# Patient Record
Sex: FEMALE | Race: WHITE | HISPANIC OR LATINO | Employment: FULL TIME | URBAN - METROPOLITAN AREA
[De-identification: names, ages, dates, MRNs, and addresses within clinical notes are randomized per-mention and may not be internally consistent; named-entity substitution may affect disease eponyms.]

---

## 2014-08-07 LAB
PAP SMEAR - HIM PATIENT REPORTED: NEGATIVE
PAP: NORMAL

## 2016-01-21 LAB
CHOLEST SERPL-MCNC: 163 MG/DL
CHOLEST/HDLC SERPL: 3.4 {RATIO}
HDLC SERPL-MCNC: 48 MG/DL
LDLC SERPL CALC-MCNC: 92 MG/DL
NONHDLC SERPL-MCNC: 115 MG/DL
TRIGL SERPL-MCNC: 115 MG/DL
TSH SERPL-ACNC: 2.8 MCU/ML

## 2017-04-05 ENCOUNTER — OFFICE VISIT (OUTPATIENT)
Dept: FAMILY MEDICINE | Facility: CLINIC | Age: 47
End: 2017-04-05
Payer: COMMERCIAL

## 2017-04-05 VITALS
HEIGHT: 63 IN | BODY MASS INDEX: 38.25 KG/M2 | WEIGHT: 215.9 LBS | HEART RATE: 87 BPM | SYSTOLIC BLOOD PRESSURE: 121 MMHG | DIASTOLIC BLOOD PRESSURE: 89 MMHG | OXYGEN SATURATION: 98 % | TEMPERATURE: 98.4 F

## 2017-04-05 DIAGNOSIS — Z13.6 CARDIOVASCULAR SCREENING; LDL GOAL LESS THAN 160: ICD-10-CM

## 2017-04-05 DIAGNOSIS — J31.0 CHRONIC RHINITIS: ICD-10-CM

## 2017-04-05 DIAGNOSIS — Z00.00 ROUTINE GENERAL MEDICAL EXAMINATION AT A HEALTH CARE FACILITY: Primary | ICD-10-CM

## 2017-04-05 DIAGNOSIS — Z12.4 SCREENING FOR CERVICAL CANCER: ICD-10-CM

## 2017-04-05 PROCEDURE — 99386 PREV VISIT NEW AGE 40-64: CPT | Performed by: FAMILY MEDICINE

## 2017-04-05 PROCEDURE — 87624 HPV HI-RISK TYP POOLED RSLT: CPT | Performed by: FAMILY MEDICINE

## 2017-04-05 PROCEDURE — G0145 SCR C/V CYTO,THINLAYER,RESCR: HCPCS | Performed by: FAMILY MEDICINE

## 2017-04-05 RX ORDER — FLUTICASONE PROPIONATE 50 MCG
1-2 SPRAY, SUSPENSION (ML) NASAL DAILY
Qty: 3 BOTTLE | Refills: 1 | Status: SHIPPED | OUTPATIENT
Start: 2017-04-05 | End: 2021-03-29

## 2017-04-05 RX ORDER — ALBUTEROL SULFATE 90 UG/1
AEROSOL, METERED RESPIRATORY (INHALATION)
COMMUNITY
Start: 2016-01-21 | End: 2019-12-05

## 2017-04-05 RX ORDER — PSEUDOEPHEDRINE HCL 120 MG/1
120 TABLET, FILM COATED, EXTENDED RELEASE ORAL EVERY 12 HOURS
Qty: 60 TABLET | Refills: 11 | Status: SHIPPED | OUTPATIENT
Start: 2017-04-05 | End: 2019-12-05

## 2017-04-05 RX ORDER — EPINEPHRINE 0.3 MG/.3ML
0.3 INJECTION SUBCUTANEOUS
COMMUNITY
Start: 2016-01-21 | End: 2019-12-05

## 2017-04-05 NOTE — MR AVS SNAPSHOT
After Visit Summary   4/5/2017    Justyna Zhang    MRN: [id number]           Patient Information     Date Of Birth          1970        Visit Information        Provider Department      4/5/2017 1:30 PM Cora Biggs MD [facility]        Today's Diagnoses     Routine general medical examination at a health care facility    -  1    Chronic rhinitis        Screening for cervical cancer        CARDIOVASCULAR SCREENING; LDL GOAL LESS THAN 160          Care Instructions      Preventive Health Recommendations  Female Ages 40 to 49    Yearly exam:     See your health care provider every year in order to  1. Review health changes.   2. Discuss preventive care.    3. Review your medicines if your doctor prescribed any.      Get a Pap test every three years (unless you have an abnormal result and your provider advises testing more often).      If you get Pap tests with HPV test, you only need to test every 5 years, unless you have an abnormal result. You do not need a Pap test if your uterus was removed (hysterectomy) and you have not had cancer.      You should be tested each year for STDs (sexually transmitted diseases), if you're at risk.       Ask your doctor if you should have a mammogram.      Have a colonoscopy (test for colon cancer) if someone in your family has had colon cancer or polyps before age 50.       Have a cholesterol test every 5 years.       Have a diabetes test (fasting glucose) after age 45. If you are at risk for diabetes, you should have this test every 3 years.    Shots: Get a flu shot each year. Get a tetanus shot every 10 years.     Nutrition:     Eat at least 5 servings of fruits and vegetables each day.    Eat whole-grain bread, whole-wheat pasta and brown rice instead of white grains and rice.    Talk to your provider about Calcium and Vitamin D.     Lifestyle    Exercise at least 150 minutes a week (an average of 30 minutes a day, 5 days a week). This will  "help you control your weight and prevent disease.    Limit alcohol to one drink per day.    No smoking.     Wear sunscreen to prevent skin cancer.    See your dentist every six months for an exam and cleaning.          Try the flonase and take the loratadine daily  Use the sudafed only when you need it   Watch the salt intake and see how the blood pressure is         Follow-ups after your visit        Who to contact     Normal or non-critical lab and imaging results will be communicated to you by [employer], letter or phone within 4 business days after the clinic has received the results. If you do not hear from us within 7 days, please contact the clinic through [employer] or phone. If you have a critical or abnormal lab result, we will notify you by phone as soon as possible.  Submit refill requests through [email or web address] or call your pharmacy and they will forward the refill request to us. Please allow 3 business days for your refill to be completed.          If you need to speak with a  for additional information , please call: [phone]             Additional Information About Your Visit        [email or web address] Information     [email or web address] lets you send messages to your doctor, view your test results, renew your prescriptions, schedule appointments and more. To sign up, go to [email or web address] . Click on \"Log in\" on the left side of the screen, which will take you to the Welcome page. Then click on \"Sign up Now\" on the right side of the page.     You will be asked to enter the access code listed below, as well as some personal information. Please follow the directions to create your username and password.     Your access code is: [email or web address]  Expires: 2017  2:26 PM     Your access code will  in 90 days. If you need help or a new code, please call your [place] clinic or [phone].        Care EveryWhere ID     This is your Care EveryWhere ID. This could be used by other organizations to access your " "[facility] medical records  [id number]        Your Vitals Were     Pulse Temperature Height Last Period Pulse Oximetry BMI (Body Mass Index)    87 98.4  F (36.9  C) (Tympanic) 5' 3\" (1.6 m) 04/02/2017 (Exact Date) 98% 38.24 kg/m2       Blood Pressure from Last 3 Encounters:   04/05/17 121/89   11/11/10 135/82   10/26/10 115/86    Weight from Last 3 Encounters:   04/05/17 215 lb 14.4 oz (97.9 kg)   10/26/10 210 lb (95.3 kg)              We Performed the Following     HPV High Risk Types DNA Cervical     Pap imaged thin layer screen with HPV - recommended age 30 - 65          Today's Medication Changes          These changes are accurate as of: 4/5/17  2:26 PM.  If you have any questions, ask your nurse or doctor.               Start taking these medicines.        Dose/Directions    fluticasone 50 MCG/ACT spray   Commonly known as:  FLONASE   Used for:  Chronic rhinitis   Started by:  Cora Biggs MD        Dose:  1-2 spray   Spray 1-2 sprays into both nostrils daily   Quantity:  3 Bottle   Refills:  1       pseudoePHEDrine 120 MG 12 hr tablet   Commonly known as:  SUDAFED   Used for:  Chronic rhinitis   Started by:  Cora Biggs MD        Dose:  120 mg   Take 1 tablet (120 mg) by mouth every 12 hours   Quantity:  60 tablet   Refills:  11            Where to get your medicines      These medications were sent to [facility] [place], [place] - [address] [facility]  [address] [facility] [address]     Phone:  [phone]     fluticasone 50 MCG/ACT spray         Some of these will need a paper prescription and others can be bought over the counter.  Ask your nurse if you have questions.     Bring a paper prescription for each of these medications     pseudoePHEDrine 120 MG 12 hr tablet                Primary Care Provider    Doctor Unknown, MD       No address on file        Thank you!     Thank you for choosing [facility]  for your care. Our goal is always to provide you with " excellent care. Hearing back from our patients is one way we can continue to improve our services. Please take a few minutes to complete the written survey that you may receive in the mail after your visit with us. Thank you!             Your Updated Medication List - Protect others around you: Learn how to safely use, store and throw away your medicines at www.disposemymeds.org.          This list is accurate as of: 4/5/17  2:26 PM.  Always use your most recent med list.                   Brand Name Dispense Instructions for use    albuterol 108 (90 BASE) MCG/ACT Inhaler    PROAIR HFA/PROVENTIL HFA/VENTOLIN HFA     Take 1-2 puffs by mouth 20- 30 minutes prior to exercise as needed.       EPINEPHrine 0.3 MG/0.3ML injection      Inject 0.3 mg into the muscle       fluticasone 50 MCG/ACT spray    FLONASE    3 Bottle    Spray 1-2 sprays into both nostrils daily       loratadine-pseudoePHEDrine 5-120 MG per 12 hr tablet    CLARITIN-D 12-hour     Take 1 tablet by mouth       NO ACTIVE MEDICATIONS          pseudoePHEDrine 120 MG 12 hr tablet    SUDAFED    60 tablet    Take 1 tablet (120 mg) by mouth every 12 hours

## 2017-04-05 NOTE — PROGRESS NOTES
SUBJECTIVE:     CC: Justyna Zhang is an 46 year old woman who presents for preventive health visit.     Healthy Habits:    Do you get at least three servings of calcium containing foods daily (dairy, green leafy vegetables, etc.)? yes and no, taking calcium and/or vitamin D supplement: no    Amount of exercise or daily activities, outside of work: 4 day(s) per week    Problems taking medications regularly No    Medication side effects: No    Have you had an eye exam in the past two years? yes    Do you see a dentist twice per year? yes    Do you have sleep apnea, excessive snoring or daytime drowsiness?no      Patient reports being sick all winter long, stuffy nose, SOB, increasing again 3 days ago.   Seen about 2/21/17 at the urgent care, given prednisone, nasal spray and clartin.      Today's PHQ-2 Score:   PHQ-2 ( 1999 Pf[employer]) 4/5/2017   Q1: Little interest or pleasure in doing things 0   Q2: Feeling down, depressed or hopeless 0   PHQ-2 Score 0       Abuse: Current or Past(Physical, Sexual or Emotional)- No  Do you feel safe in your environment - Yes    Social History   Substance Use Topics     Smoking status: Never Smoker     Smokeless tobacco: Not on file     Alcohol use Yes      Comment: monthly     The patient does not drink >3 drinks per day nor >7 drinks per week.    No results for input(s): CHOL, HDL, LDL, TRIG, CHOLHDLRATIO, NHDL in the last 70303 hours.    Reviewed orders with patient.  Reviewed health maintenance and updated orders accordingly - Yes    Mammo Decision Support:  Patient under age 50, mutual decision reflected in health maintenance.      Pertinent mammograms are reviewed under the imaging tab.    No results found for: PAP  Patient reported she is due.    History of abnormal Pap smear: NO - age 30- 65 PAP every 3 years recommended    Reviewed and updated as needed this visit by clinical staff  Tobacco  Allergies  Meds  Med Hx  Surg Hx  Fam Hx  Soc Hx        Reviewed and  "updated as needed this visit by Provider     she got sick on Monday with stuffy nose and sinus and when she laid down she couldn't get air in her lungs she is vbetter today than yesterday.  Had a different cold about 6 weeks ago and she had prednisone and flonase   She had       ROS:  C: NEGATIVE for fever, chills, change in weight  I: NEGATIVE for worrisome rashes, moles or lesions  E: NEGATIVE for vision changes or irritation  ENT: as above   R: NEGATIVE for significant cough or SOB  B: NEGATIVE for masses, tenderness or discharge  CV: NEGATIVE for chest pain, palpitations or peripheral edema  GI: NEGATIVE for nausea, abdominal pain, heartburn, or change in bowel habits  : NEGATIVE for unusual urinary or vaginal symptoms. Periods are regular.  M: NEGATIVE for significant arthralgias or myalgia  N: NEGATIVE for weakness, dizziness or paresthesias  P: NEGATIVE for changes in mood or affect    Problem list, Medication list, Allergies, and Medical/Social/Surgical histories reviewed in EPIC and updated as appropriate.  OBJECTIVE:     /89 (BP Location: Right arm, Cuff Size: Adult Large)  Pulse 87  Temp 98.4  F (36.9  C) (Tympanic)  Ht 5' 3\" (1.6 m)  Wt 215 lb 14.4 oz (97.9 kg)  LMP 04/02/2017 (Exact Date)  SpO2 98%  BMI 38.24 kg/m2  EXAM:  GENERAL: healthy, alert and no distress  HENT: ear canals and right tm with clear fluid behind right left normal , nose and mouth without ulcers or lesions clear nasal d/c NECK: no adenopathy, no asymmetry, masses, or scars and thyroid normal to palpation  RESP: lungs clear to auscultation - no rales, rhonchi or wheezes  BREAST: normal without masses, tenderness or nipple discharge and no palpable axillary masses or adenopathy  CV: regular rate and rhythm, normal S1 S2, no S3 or S4, no murmur, click or rub, no peripheral edema and peripheral pulses strong  ABDOMEN: soft, nontender, no hepatosplenomegaly, no masses and bowel sounds normal   (female): normal female " "external genitalia, normal urethral meatus , vaginal mucosa pink, moist, well rugated, vaginal discharge - bloody and normal cervix, adnexae, and uterus without masses.  MS: no gross musculoskeletal defects noted, no edema  SKIN: no suspicious lesions or rashes  NEURO: Normal strength and tone, mentation intact and speech normal  PSYCH: mentation appears normal, affect normal/bright    ASSESSMENT/PLAN:         ICD-10-CM    1. Routine general medical examination at a health care facility Z00.00    2. Chronic rhinitis J31.0 fluticasone (FLONASE) 50 MCG/ACT spray     pseudoePHEDrine (SUDAFED) 120 MG 12 hr tablet   3. Screening for cervical cancer Z12.4 Pap imaged thin layer screen with HPV - recommended age 30 - 65     HPV High Risk Types DNA Cervical   4. CARDIOVASCULAR SCREENING; LDL GOAL LESS THAN 160 Z13.6        COUNSELING:   Reviewed preventive health counseling, as reflected in patient instructions    BP Screening:   Last 3 BP Readings:    BP Readings from Last 3 Encounters:   04/05/17 121/89   11/11/10 135/82   10/26/10 115/86       The following was recommended to the patient:  Re-screen BP within a year and recommended lifestyle modifications     reports that she has never smoked. She does not have any smokeless tobacco history on file.    Estimated body mass index is 38.24 kg/(m^2) as calculated from the following:    Height as of this encounter: 5' 3\" (1.6 m).    Weight as of this encounter: 215 lb 14.4 oz (97.9 kg).   Weight management plan: Discussed healthy diet and exercise guidelines and patient will follow up in 6 months in clinic to re-evaluate.    Counseling Resources:  ATP IV Guidelines  Pooled Cohorts Equation Calculator  Breast Cancer Risk Calculator  FRAX Risk Assessment  ICSI Preventive Guidelines  Dietary Guidelines for Americans, 2010  USDA's MyPlate  ASA Prophylaxis  Lung CA Screening    Cora Biggs MD  [facility]  "

## 2017-04-05 NOTE — LETTER
April 13, 2017    Justyna Zhang  1961 [address] [facility] [address]    Dear Justyna,  We are happy to inform you that your PAP smear result from 4/5/17 is normal.  We are now able to do a follow up test on PAP smears. The DNA test is for HPV (Human Papilloma Virus). Cervical cancer is closely linked with certain types of HPV. Your result showed no evidence of high risk HPV.  Therefore we recommend you return in 3 years for your next pap smear and HPV test.  You will still need to return to the clinic every year for an annual exam and other preventive tests.  Please contact the clinic at [phone] with any questions.  Sincerely,    Cora Biggs MD/diana

## 2017-04-05 NOTE — NURSING NOTE
"Chief Complaint   Patient presents with     Physical       Initial /89 (BP Location: Right arm, Cuff Size: Adult Large)  Pulse 87  Temp 98.4  F (36.9  C) (Tympanic)  Ht 5' 3\" (1.6 m)  Wt 215 lb 14.4 oz (97.9 kg)  LMP 04/02/2017 (Exact Date)  SpO2 98%  BMI 38.24 kg/m2 Estimated body mass index is 38.24 kg/(m^2) as calculated from the following:    Height as of this encounter: 5' 3\" (1.6 m).    Weight as of this encounter: 215 lb 14.4 oz (97.9 kg).  Medication Reconciliation: complete   Felicity Mays CMA    "

## 2017-04-05 NOTE — PATIENT INSTRUCTIONS
Preventive Health Recommendations  Female Ages 40 to 49    Yearly exam:     See your health care provider every year in order to  1. Review health changes.   2. Discuss preventive care.    3. Review your medicines if your doctor prescribed any.      Get a Pap test every three years (unless you have an abnormal result and your provider advises testing more often).      If you get Pap tests with HPV test, you only need to test every 5 years, unless you have an abnormal result. You do not need a Pap test if your uterus was removed (hysterectomy) and you have not had cancer.      You should be tested each year for STDs (sexually transmitted diseases), if you're at risk.       Ask your doctor if you should have a mammogram.      Have a colonoscopy (test for colon cancer) if someone in your family has had colon cancer or polyps before age 50.       Have a cholesterol test every 5 years.       Have a diabetes test (fasting glucose) after age 45. If you are at risk for diabetes, you should have this test every 3 years.    Shots: Get a flu shot each year. Get a tetanus shot every 10 years.     Nutrition:     Eat at least 5 servings of fruits and vegetables each day.    Eat whole-grain bread, whole-wheat pasta and brown rice instead of white grains and rice.    Talk to your provider about Calcium and Vitamin D.     Lifestyle    Exercise at least 150 minutes a week (an average of 30 minutes a day, 5 days a week). This will help you control your weight and prevent disease.    Limit alcohol to one drink per day.    No smoking.     Wear sunscreen to prevent skin cancer.    See your dentist every six months for an exam and cleaning.          Try the flonase and take the loratadine daily  Use the sudafed only when you need it   Watch the salt intake and see how the blood pressure is

## 2017-04-10 LAB
COPATH REPORT: NORMAL
PAP: NORMAL

## 2017-04-11 LAB
FINAL DIAGNOSIS: NORMAL
HPV HR 12 DNA CVX QL NAA+PROBE: NEGATIVE
HPV16 DNA SPEC QL NAA+PROBE: NEGATIVE
HPV18 DNA SPEC QL NAA+PROBE: NEGATIVE
SPECIMEN DESCRIPTION: NORMAL

## 2017-05-03 ENCOUNTER — OFFICE VISIT (OUTPATIENT)
Dept: FAMILY MEDICINE | Facility: CLINIC | Age: 47
End: 2017-05-03
Payer: COMMERCIAL

## 2017-05-03 VITALS
WEIGHT: 215.1 LBS | HEART RATE: 90 BPM | TEMPERATURE: 98.9 F | OXYGEN SATURATION: 98 % | SYSTOLIC BLOOD PRESSURE: 120 MMHG | HEIGHT: 63 IN | DIASTOLIC BLOOD PRESSURE: 84 MMHG | BODY MASS INDEX: 38.11 KG/M2

## 2017-05-03 DIAGNOSIS — R05.9 COUGH: ICD-10-CM

## 2017-05-03 DIAGNOSIS — J01.90 ACUTE SINUSITIS WITH SYMPTOMS > 10 DAYS: ICD-10-CM

## 2017-05-03 DIAGNOSIS — J31.0 CHRONIC RHINITIS: Primary | ICD-10-CM

## 2017-05-03 PROCEDURE — 99213 OFFICE O/P EST LOW 20 MIN: CPT | Performed by: PHYSICIAN ASSISTANT

## 2017-05-03 RX ORDER — PREDNISONE 20 MG/1
40 TABLET ORAL DAILY
Qty: 10 TABLET | Refills: 0 | Status: SHIPPED | OUTPATIENT
Start: 2017-05-03 | End: 2017-05-08

## 2017-05-03 RX ORDER — BENZONATATE 200 MG/1
200 CAPSULE ORAL 3 TIMES DAILY PRN
Qty: 21 CAPSULE | Refills: 0 | Status: SHIPPED | OUTPATIENT
Start: 2017-05-03 | End: 2019-12-05

## 2017-05-03 RX ORDER — CODEINE PHOSPHATE AND GUAIFENESIN 10; 100 MG/5ML; MG/5ML
1-2 SOLUTION ORAL EVERY 4 HOURS PRN
Qty: 120 ML | Refills: 0 | Status: SHIPPED | OUTPATIENT
Start: 2017-05-03 | End: 2019-12-05

## 2017-05-03 RX ORDER — ALBUTEROL SULFATE 90 UG/1
2 AEROSOL, METERED RESPIRATORY (INHALATION) EVERY 6 HOURS PRN
Qty: 1 INHALER | Refills: 0 | Status: SHIPPED | OUTPATIENT
Start: 2017-05-03 | End: 2020-12-04

## 2017-05-03 RX ORDER — LORATADINE 10 MG/1
10 TABLET ORAL DAILY
COMMUNITY
End: 2019-12-05

## 2017-05-03 NOTE — PATIENT INSTRUCTIONS
Have plenty of rest and fluids  Humidified air can be very helpful with cough  Use inhaler every 4-6 hours during this illness  Use robitussin at night - can make you groggy  Use tessalon cough suppressants up to three times during the day to cut cough  Use nasal spray Afrin OTC for 3-4 days in each nostril for congestion  Use the saline spray once your nose opens up  augmentin antibiotics  Can use mucinex too  Follow up if worsening symptoms or if not improving  Be seen urgently if worsening breathing

## 2017-05-03 NOTE — PROGRESS NOTES
SUBJECTIVE:                                                    Justyna Zhang is a 46 year old female who presents to clinic today for the following health issues:    ENT Symptoms             Symptoms: cc Present Absent Comment   Fever/Chills   x Has felt feverish but never took temp   Fatigue  x     Muscle Aches       Eye Irritation  x  Eyes watering   Sneezing  x     Nasal Rajan/Drg x x  Congestion, not running. potnasal drip   Sinus Pressure/Pain  x     Loss of smell   x    Dental pain   x    Sore Throat   x Sore from coughing   Swollen Glands   x    Ear Pain/Fullness   x    Cough x x  Harsh, dry for the most part   Wheeze  x  Wheezing at night time   Chest Pain   x    Shortness of breath  x     Rash   x    Other  x  headache     Symptom duration:  Ongoing for the last month   Symptom severity:  moderate   Treatments tried:  Claritin taken today at 930 am, sudafed, flonase   Contacts:  None       Has had allergy testing and shots (then had reaction to them), does have seasonal allergies, spring is usually bad  - she sometimes gets used to claritin vs alavert  - did  zyrtec yesterday to try  Flonase. Doesn't have neti pot  Has had inhaler for exercise but hasn't needed recently with exercise    2/21 given prednisone and augmentin    Problem list and histories reviewed & adjusted, as indicated.  Additional history: none    Patient Active Problem List   Diagnosis     Chronic rhinitis     CARDIOVASCULAR SCREENING; LDL GOAL LESS THAN 160     History reviewed. No pertinent surgical history.    Social History   Substance Use Topics     Smoking status: Never Smoker     Smokeless tobacco: Not on file     Alcohol use Yes      Comment: monthly     Family History   Problem Relation Age of Onset     Unknown/Adopted Other            ROS:  Other than noted above, general, HEENT, respiratory, cardiac, MS, and gastrointestinal systems are negative.     OBJECTIVE:                                                    BP  "120/84  Pulse 90  Temp 98.9  F (37.2  C)  Ht 5' 3\" (1.6 m)  Wt 215 lb 1.6 oz (97.6 kg)  LMP 04/02/2017 (Exact Date)  SpO2 98%  BMI 38.1 kg/m2 Body mass index is 38.1 kg/(m^2).   GENERAL: healthy, alert, well nourished, well hydrated, no distress  HENT: ear canals- normal; TMs- normal; Nose- normal; Mouth- no ulcers, no lesions POSITIVE nasal congestion. Sinus tenderness to palpation  NECK: no tenderness, no adenopathy, no asymmetry, no masses, no stiffness; thyroid- normal to palpation  RESP: lungs clear to auscultation - no rales, no rhonchi, no wheezes  CV: regular rates and rhythm, normal S1 S2, no S3 or S4 and no murmur, no click or rub -  ABDOMEN: soft, no tenderness, no  hepatosplenomegaly, no masses, normal bowel sounds    Patient well appearing, breathing easily in clinic, speaking in full sentences easily, no signs of cyanosis or respiratory distress.  Frequent harsh cough in room     ASSESSMENT/PLAN:                                                      ASSESSMENT/PLAN:      ICD-10-CM    1. Chronic rhinitis J31.0 ALLERGY/ASTHMA ADULT REFERRAL   2. Acute sinusitis with symptoms > 10 days J01.90 amoxicillin-clavulanate (AUGMENTIN) 875-125 MG per tablet     predniSONE (DELTASONE) 20 MG tablet   3. Cough R05 albuterol (PROAIR HFA/PROVENTIL HFA/VENTOLIN HFA) 108 (90 BASE) MCG/ACT Inhaler     predniSONE (DELTASONE) 20 MG tablet     benzonatate (TESSALON) 200 MG capsule     guaiFENesin-codeine (ROBITUSSIN AC) 100-10 MG/5ML SOLN solution         Patient Instructions   Have plenty of rest and fluids  Humidified air can be very helpful with cough  Use inhaler every 4-6 hours during this illness  Use robitussin at night - can make you groggy  Use tessalon cough suppressants up to three times during the day to cut cough  Use nasal spray Afrin OTC for 3-4 days in each nostril for congestion  Use the saline spray once your nose opens up  augmentin antibiotics  Can use mucinex too  Follow up if worsening symptoms " or if not improving  Be seen urgently if worsening breathing     Judy David PA-C   [facility]

## 2017-05-03 NOTE — NURSING NOTE
"Chief Complaint   Patient presents with     Cough       Initial BP (!) 129/93 (BP Location: Right arm, Patient Position: Chair, Cuff Size: Adult Regular)  Pulse 90  Temp 98.9  F (37.2  C)  Ht 5' 3\" (1.6 m)  Wt 215 lb 1.6 oz (97.6 kg)  LMP 04/02/2017 (Exact Date)  BMI 38.1 kg/m2 Estimated body mass index is 38.1 kg/(m^2) as calculated from the following:    Height as of this encounter: 5' 3\" (1.6 m).    Weight as of this encounter: 215 lb 1.6 oz (97.6 kg).  Medication Reconciliation: complete  "

## 2017-05-03 NOTE — MR AVS SNAPSHOT
After Visit Summary   5/3/2017    Justyna Zhang    MRN: [id number]           Patient Information     Date Of Birth          1970        Visit Information        Provider Department      5/3/2017 1:00 PM Judy David PA-C [facility]        Today's Diagnoses     Chronic rhinitis    -  1    Acute sinusitis with symptoms > 10 days        Cough          Care Instructions    Have plenty of rest and fluids  Humidified air can be very helpful with cough  Use inhaler every 4-6 hours during this illness  Use robitussin at night - can make you groggy  Use tessalon cough suppressants up to three times during the day to cut cough  Use nasal spray Afrin OTC for 3-4 days in each nostril for congestion  Use the saline spray once your nose opens up  augmentin antibiotics  Can use mucinex too  Follow up if worsening symptoms or if not improving  Be seen urgently if worsening breathing         Follow-ups after your visit        Additional Services     ALLERGY/ASTHMA ADULT REFERRAL       Your provider has referred you to: [facility]:  [facility] [phone] [email or web address]  [facility]: [facility] [phone] [email or web address]    Please be aware that coverage of these services is subject to the terms and limitations of your health insurance plan.  Call member services at your health plan with any benefit or coverage questions.      Please bring the following with you to your appointment:    (1) Any X-Rays, CTs or MRIs which have been performed.  Contact the facility where they were done to arrange for  prior to your scheduled appointment.    (2) List of current medications  (3) This referral request   (4) Any documents/labs given to you for this referral                  Who to contact     Normal or non-critical lab and imaging results will be communicated to you by MyChart, letter or phone within 4  "business days after the clinic has received the results. If you do not hear from us within 7 days, please contact the clinic through [facility] or phone. If you have a critical or abnormal lab result, we will notify you by phone as soon as possible.  Submit refill requests through [facility] or call your pharmacy and they will forward the refill request to us. Please allow 3 business days for your refill to be completed.          If you need to speak with a  for additional information , please call: [phone]             Additional Information About Your Visit        [facility] Information     [facility] lets you send messages to your doctor, view your test results, renew your prescriptions, schedule appointments and more. To sign up, go to [email or web address] . Click on \"Log in\" on the left side of the screen, which will take you to the Welcome page. Then click on \"Sign up Now\" on the right side of the page.     You will be asked to enter the access code listed below, as well as some personal information. Please follow the directions to create your username and password.     Your access code is: [email or web address]  Expires: 2017  2:26 PM     Your access code will  in 90 days. If you need help or a new code, please call your [place] clinic or [phone].        Care EveryWhere ID     This is your Care EveryWhere ID. This could be used by other organizations to access your [place] medical records  [id number]        Your Vitals Were     Pulse Temperature Height Last Period Pulse Oximetry BMI (Body Mass Index)    90 98.9  F (37.2  C) 5' 3\" (1.6 m) 2017 (Exact Date) 98% 38.1 kg/m2       Blood Pressure from Last 3 Encounters:   17 120/84   17 121/89   11/11/10 135/82    Weight from Last 3 Encounters:   17 215 lb 1.6 oz (97.6 kg)   17 215 lb 14.4 oz (97.9 kg)   10/26/10 210 lb (95.3 kg)              We Performed the Following     ALLERGY/ASTHMA ADULT REFERRAL        "   Today's Medication Changes          These changes are accurate as of: 5/3/17  1:40 PM.  If you have any questions, ask your nurse or doctor.               Start taking these medicines.        Dose/Directions    amoxicillin-clavulanate 875-125 MG per tablet   Commonly known as:  AUGMENTIN   Used for:  Acute sinusitis with symptoms > 10 days   Started by:  Judy David PA-C        Dose:  1 tablet   Take 1 tablet by mouth 2 times daily   Quantity:  20 tablet   Refills:  0       benzonatate 200 MG capsule   Commonly known as:  TESSALON   Used for:  Cough   Started by:  Judy David PA-C        Dose:  200 mg   Take 1 capsule (200 mg) by mouth 3 times daily as needed for cough   Quantity:  21 capsule   Refills:  0       guaiFENesin-codeine 100-10 MG/5ML Soln solution   Commonly known as:  ROBITUSSIN AC   Used for:  Cough   Started by:  Judy David PA-RE        Dose:  1-2 tsp.   Take 5-10 mLs by mouth every 4 hours as needed   Quantity:  120 mL   Refills:  0       predniSONE 20 MG tablet   Commonly known as:  DELTASONE   Used for:  Acute sinusitis with symptoms > 10 days, Cough   Started by:  Judy David PA-C        Dose:  40 mg   Take 2 tablets (40 mg) by mouth daily for 5 days   Quantity:  10 tablet   Refills:  0         These medicines have changed or have updated prescriptions.        Dose/Directions    * albuterol 108 (90 BASE) MCG/ACT Inhaler   Commonly known as:  PROAIR HFA/PROVENTIL HFA/VENTOLIN HFA   This may have changed:  Another medication with the same name was added. Make sure you understand how and when to take each.   Changed by:  Cora Biggs MD        Take 1-2 puffs by mouth 20- 30 minutes prior to exercise as needed.   Refills:  0       * albuterol 108 (90 BASE) MCG/ACT Inhaler   Commonly known as:  PROAIR HFA/PROVENTIL HFA/VENTOLIN HFA   This may have changed:  You were already taking a medication with the same name, and this prescription was added. Make sure you  understand how and when to take each.   Used for:  Cough   Changed by:  Judy David PA-C        Dose:  2 puff   Inhale 2 puffs into the lungs every 6 hours as needed for shortness of breath / dyspnea or wheezing   Quantity:  1 Inhaler   Refills:  0       * Notice:  This list has 2 medication(s) that are the same as other medications prescribed for you. Read the directions carefully, and ask your doctor or other care provider to review them with you.         Where to get your medicines      These medications were sent to [place] - [address] [facility]  [address] [facility] [address]     Phone:  [phone]     albuterol 108 (90 BASE) MCG/ACT Inhaler    amoxicillin-clavulanate 875-125 MG per tablet    benzonatate 200 MG capsule    predniSONE 20 MG tablet         Some of these will need a paper prescription and others can be bought over the counter.  Ask your nurse if you have questions.     Bring a paper prescription for each of these medications     guaiFENesin-codeine 100-10 MG/5ML Soln solution                Primary Care Provider    Doctor Unknown, MD       No address on file        Thank you!     Thank you for choosing [facility]  for your care. Our goal is always to provide you with excellent care. Hearing back from our patients is one way we can continue to improve our services. Please take a few minutes to complete the written survey that you may receive in the mail after your visit with us. Thank you!             Your Updated Medication List - Protect others around you: Learn how to safely use, store and throw away your medicines at www.disposemymeds.org.          This list is accurate as of: 5/3/17  1:40 PM.  Always use your most recent med list.                   Brand Name Dispense Instructions for use    * albuterol 108 (90 BASE) MCG/ACT Inhaler    PROAIR HFA/PROVENTIL HFA/VENTOLIN HFA     Take 1-2 puffs by mouth 20- 30 minutes prior to exercise as  needed.       * albuterol 108 (90 BASE) MCG/ACT Inhaler    PROAIR HFA/PROVENTIL HFA/VENTOLIN HFA    1 Inhaler    Inhale 2 puffs into the lungs every 6 hours as needed for shortness of breath / dyspnea or wheezing       amoxicillin-clavulanate 875-125 MG per tablet    AUGMENTIN    20 tablet    Take 1 tablet by mouth 2 times daily       benzonatate 200 MG capsule    TESSALON    21 capsule    Take 1 capsule (200 mg) by mouth 3 times daily as needed for cough       EPINEPHrine 0.3 MG/0.3ML injection      Inject 0.3 mg into the muscle       fluticasone 50 MCG/ACT spray    FLONASE    3 Bottle    Spray 1-2 sprays into both nostrils daily       guaiFENesin-codeine 100-10 MG/5ML Soln solution    ROBITUSSIN AC    120 mL    Take 5-10 mLs by mouth every 4 hours as needed       loratadine 10 MG tablet    CLARITIN     Take 10 mg by mouth daily       loratadine-pseudoePHEDrine 5-120 MG per 12 hr tablet    CLARITIN-D 12-hour     Take 1 tablet by mouth       NO ACTIVE MEDICATIONS          predniSONE 20 MG tablet    DELTASONE    10 tablet    Take 2 tablets (40 mg) by mouth daily for 5 days       pseudoePHEDrine 120 MG 12 hr tablet    SUDAFED    60 tablet    Take 1 tablet (120 mg) by mouth every 12 hours       * Notice:  This list has 2 medication(s) that are the same as other medications prescribed for you. Read the directions carefully, and ask your doctor or other care provider to review them with you.

## 2019-12-05 ENCOUNTER — OFFICE VISIT (OUTPATIENT)
Dept: FAMILY MEDICINE | Facility: CLINIC | Age: 49
End: 2019-12-05
Payer: COMMERCIAL

## 2019-12-05 ENCOUNTER — APPOINTMENT (OUTPATIENT)
Dept: LAB | Facility: CLINIC | Age: 49
End: 2019-12-05
Payer: COMMERCIAL

## 2019-12-05 VITALS
TEMPERATURE: 99.6 F | HEART RATE: 92 BPM | WEIGHT: 215.4 LBS | HEIGHT: 63 IN | SYSTOLIC BLOOD PRESSURE: 119 MMHG | BODY MASS INDEX: 38.16 KG/M2 | DIASTOLIC BLOOD PRESSURE: 83 MMHG

## 2019-12-05 DIAGNOSIS — Z91.09 MULTIPLE ENVIRONMENTAL ALLERGIES: ICD-10-CM

## 2019-12-05 DIAGNOSIS — B35.1 FUNGAL INFECTION OF TOENAIL: Primary | ICD-10-CM

## 2019-12-05 LAB
ALT SERPL W P-5'-P-CCNC: 29 U/L (ref 0–50)
AST SERPL W P-5'-P-CCNC: 20 U/L (ref 0–45)

## 2019-12-05 PROCEDURE — 99214 OFFICE O/P EST MOD 30 MIN: CPT | Performed by: PHYSICIAN ASSISTANT

## 2019-12-05 PROCEDURE — 36415 COLL VENOUS BLD VENIPUNCTURE: CPT | Performed by: PHYSICIAN ASSISTANT

## 2019-12-05 PROCEDURE — 84460 ALANINE AMINO (ALT) (SGPT): CPT | Performed by: PHYSICIAN ASSISTANT

## 2019-12-05 PROCEDURE — 84450 TRANSFERASE (AST) (SGOT): CPT | Performed by: PHYSICIAN ASSISTANT

## 2019-12-05 RX ORDER — LEVOCETIRIZINE DIHYDROCHLORIDE 5 MG/1
5 TABLET, FILM COATED ORAL EVERY EVENING
COMMUNITY
End: 2020-12-04

## 2019-12-05 RX ORDER — AMOXICILLIN 500 MG/1
500 CAPSULE ORAL
COMMUNITY
Start: 2019-11-25 | End: 2019-12-05

## 2019-12-05 RX ORDER — FUROSEMIDE 20 MG
20 TABLET ORAL
COMMUNITY
Start: 2019-03-11 | End: 2022-06-06

## 2019-12-05 RX ORDER — TERBINAFINE HYDROCHLORIDE 250 MG/1
250 TABLET ORAL DAILY
Qty: 90 TABLET | Refills: 0 | Status: SHIPPED | OUTPATIENT
Start: 2019-12-05 | End: 2020-03-04

## 2019-12-05 RX ORDER — EPINEPHRINE 0.3 MG/.3ML
0.3 INJECTION SUBCUTANEOUS PRN
Qty: 1 EACH | Refills: 0 | Status: SHIPPED | OUTPATIENT
Start: 2019-12-05 | End: 2020-12-04

## 2019-12-05 ASSESSMENT — MIFFLIN-ST. JEOR: SCORE: 1580.43

## 2019-12-05 NOTE — PROGRESS NOTES
"Subjective     Justyna Zhang is a 49 year old female who presents to clinic today for the following health issues:    HPI   *  Toenail fungus on both feet, left great toenail feels like it is coming off. Started about 2 years ago and just progressively getting worse. She has tried multiple over the counter with no relief at all.  -------------------------------------  Patient has had significant toe nail fungus to both feet and all toes for about 2 years. She has tried all the OTC treatment options but has not had any improvement and is starting to have pain to the areas due to the fungs. She would like to try a medication to help resolve this.     BP Readings from Last 3 Encounters:   12/05/19 119/83   05/03/17 120/84   04/05/17 121/89    Wt Readings from Last 3 Encounters:   12/05/19 97.7 kg (215 lb 6.4 oz)   05/03/17 97.6 kg (215 lb 1.6 oz)   04/05/17 97.9 kg (215 lb 14.4 oz)                    Reviewed and updated as needed this visit by Provider  Tobacco  Allergies  Meds  Problems  Med Hx  Surg Hx  Fam Hx  Soc Hx          Review of Systems   ROS COMP: Constitutional, HEENT, cardiovascular, pulmonary, gi and gu systems are negative, except as otherwise noted.      Objective    /83   Pulse 92   Temp 99.6  F (37.6  C) (Tympanic)   Ht 1.599 m (5' 2.95\")   Wt 97.7 kg (215 lb 6.4 oz)   BMI 38.21 kg/m    Body mass index is 38.21 kg/m .  Physical Exam   GENERAL: healthy, alert and no distress  RESP: lungs clear to auscultation - no rales, rhonchi or wheezes  CV: regular rate and rhythm, normal S1 S2, no S3 or S4, no murmur, click or rub, no peripheral edema and peripheral pulses strong  MS: no gross musculoskeletal defects noted, no edema  SKIN: thickening and yellowing of all toe nails.     Diagnostic Test Results:  AST and ALT check pending        Assessment & Plan       ICD-10-CM    1. Fungal infection of toenail B35.1 terbinafine (LAMISIL) 250 MG tablet     ALT     AST     **ALT FUTURE " "anytime     **AST FUTURE 2mo   2. Multiple environmental allergies Z91.09 EPINEPHrine (EPIPEN/ADRENACLICK/OR ANY BX GENERIC EQUIV) 0.3 MG/0.3ML injection 2-pack        BMI:   Estimated body mass index is 38.21 kg/m  as calculated from the following:    Height as of this encounter: 1.599 m (5' 2.95\").    Weight as of this encounter: 97.7 kg (215 lb 6.4 oz).           I will treat with oral Lamisil and will monitor liver enzymes to ensure no significant change with treatment. Follow up for recheck if symptoms are not resolving with treatment.    See Patient Instructions    Return in about 12 weeks (around 2/27/2020) for recheck labs and nails .    Ayesha Fletcher PA-C  [facility]    "

## 2019-12-05 NOTE — PATIENT INSTRUCTIONS
Patient Education     Nail Fungal Infection  A nail fungal infection changes the way fingernails and toenails look. They may thicken, discolor, change shape, or split. This condition is hard to treat because nails grow slowly and have limited blood supply. The infection often comes back after treatment.  There are 2 types of medicines used to treat this condition:    Topical anti-fungal medicines. These are applied to the surface of the skin and nail area. These medicines are not very effective because they can t get deep into the nail.    Oral antifungal medicines. These medicines work better because they go into the nail from the inside out. But the infection may still come back. It may take 9 to 12 months for your nail to look normal again. This means you are cured. You can repeat treatment if needed. Most people take these medicines without any problems. It is rare to stop therapy because of side effects. But your healthcare provider may give you some monitoring tests. Talk about possible side effects with your provider before starting treatment.  If medicines fail, the nail can be removed surgically or chemically. These methods physically remove the fungus from the body. This helps medical treatment be more effective.  Home care    Use medicines exactly as directed for as long as directed. Treating a fungal infection can take longer than other kinds of infections.    Smoking is a risk factor for fungal infection. This is one more reason to quit.    Wear absorbent socks, and shoes that let your feet breathe. Sweaty feet increase your risk of fungal infection. They also make an existing infection harder to treat.    Use footwear when in damp public places like swimming pools, gyms, and shower rooms. This will help you avoid the fungus that grows there.    Don't share nail clippers or scissors with others.  Follow-up care  Follow up with your healthcare provider, or as advised.  When to seek medical advice  Call  your healthcare provider right away if any of these occur:    Skin by the nail becomes red, swollen, painful, or drains pus (a creamy yellow or white liquid)    Side effects from oral anti-fungal medicines  Date Last Reviewed: 8/1/2016 2000-2018 [employer]. [address] [place] [address]. All rights reserved. This information is not intended as a substitute for professional medical care. Always follow your healthcare professional's instructions.

## 2020-02-10 ENCOUNTER — HEALTH MAINTENANCE LETTER (OUTPATIENT)
Age: 50
End: 2020-02-10

## 2020-11-16 ENCOUNTER — HEALTH MAINTENANCE LETTER (OUTPATIENT)
Age: 50
End: 2020-11-16

## 2020-12-03 ASSESSMENT — ENCOUNTER SYMPTOMS
MYALGIAS: 0
DIZZINESS: 0
DIARRHEA: 0
EYE PAIN: 0
HEARTBURN: 0
CONSTIPATION: 0
HEMATOCHEZIA: 0
CHILLS: 0
ABDOMINAL PAIN: 0
PALPITATIONS: 0
SHORTNESS OF BREATH: 0
NERVOUS/ANXIOUS: 0
ARTHRALGIAS: 0
FREQUENCY: 0
PARESTHESIAS: 0
FEVER: 0
COUGH: 0
WEAKNESS: 0
HEADACHES: 0
HEMATURIA: 0
JOINT SWELLING: 0
NAUSEA: 0
DYSURIA: 0
SORE THROAT: 0
BREAST MASS: 0

## 2020-12-04 ENCOUNTER — OFFICE VISIT (OUTPATIENT)
Dept: FAMILY MEDICINE | Facility: CLINIC | Age: 50
End: 2020-12-04
Payer: COMMERCIAL

## 2020-12-04 VITALS
SYSTOLIC BLOOD PRESSURE: 133 MMHG | TEMPERATURE: 98.1 F | HEIGHT: 63 IN | RESPIRATION RATE: 15 BRPM | WEIGHT: 223.8 LBS | DIASTOLIC BLOOD PRESSURE: 89 MMHG | BODY MASS INDEX: 39.65 KG/M2 | OXYGEN SATURATION: 95 % | HEART RATE: 92 BPM

## 2020-12-04 DIAGNOSIS — M79.7 FIBROMYALGIA: ICD-10-CM

## 2020-12-04 DIAGNOSIS — L91.8 SKIN TAG: ICD-10-CM

## 2020-12-04 DIAGNOSIS — Z11.4 SCREENING FOR HIV (HUMAN IMMUNODEFICIENCY VIRUS): ICD-10-CM

## 2020-12-04 DIAGNOSIS — Z12.31 VISIT FOR SCREENING MAMMOGRAM: ICD-10-CM

## 2020-12-04 DIAGNOSIS — R05.9 COUGH: ICD-10-CM

## 2020-12-04 DIAGNOSIS — N89.8 VAGINAL DISCHARGE: ICD-10-CM

## 2020-12-04 DIAGNOSIS — Z11.59 NEED FOR HEPATITIS C SCREENING TEST: ICD-10-CM

## 2020-12-04 DIAGNOSIS — Z00.00 ROUTINE GENERAL MEDICAL EXAMINATION AT A HEALTH CARE FACILITY: Primary | ICD-10-CM

## 2020-12-04 DIAGNOSIS — R06.83 SNORES: ICD-10-CM

## 2020-12-04 DIAGNOSIS — Z91.09 MULTIPLE ENVIRONMENTAL ALLERGIES: ICD-10-CM

## 2020-12-04 DIAGNOSIS — Z12.4 SCREENING FOR MALIGNANT NEOPLASM OF CERVIX: ICD-10-CM

## 2020-12-04 LAB
ANION GAP SERPL CALCULATED.3IONS-SCNC: 7 MMOL/L (ref 3–14)
BUN SERPL-MCNC: 13 MG/DL (ref 7–30)
CALCIUM SERPL-MCNC: 8.9 MG/DL (ref 8.5–10.1)
CHLORIDE SERPL-SCNC: 106 MMOL/L (ref 94–109)
CHOLEST SERPL-MCNC: 183 MG/DL
CO2 SERPL-SCNC: 25 MMOL/L (ref 20–32)
CREAT SERPL-MCNC: 0.77 MG/DL (ref 0.52–1.04)
GFR SERPL CREATININE-BSD FRML MDRD: >90 ML/MIN/{1.73_M2}
GLUCOSE SERPL-MCNC: 82 MG/DL (ref 70–99)
HDLC SERPL-MCNC: 61 MG/DL
LDLC SERPL CALC-MCNC: 81 MG/DL
NONHDLC SERPL-MCNC: 122 MG/DL
POTASSIUM SERPL-SCNC: 3.9 MMOL/L (ref 3.4–5.3)
SODIUM SERPL-SCNC: 138 MMOL/L (ref 133–144)
SPECIMEN SOURCE: NORMAL
TRIGL SERPL-MCNC: 203 MG/DL
WET PREP SPEC: NORMAL

## 2020-12-04 PROCEDURE — G0145 SCR C/V CYTO,THINLAYER,RESCR: HCPCS | Performed by: PHYSICIAN ASSISTANT

## 2020-12-04 PROCEDURE — 80061 LIPID PANEL: CPT | Performed by: PHYSICIAN ASSISTANT

## 2020-12-04 PROCEDURE — 87624 HPV HI-RISK TYP POOLED RSLT: CPT | Performed by: PHYSICIAN ASSISTANT

## 2020-12-04 PROCEDURE — 99396 PREV VISIT EST AGE 40-64: CPT | Mod: 25 | Performed by: PHYSICIAN ASSISTANT

## 2020-12-04 PROCEDURE — 11200 RMVL SKIN TAGS UP TO&INC 15: CPT | Performed by: PHYSICIAN ASSISTANT

## 2020-12-04 PROCEDURE — 87210 SMEAR WET MOUNT SALINE/INK: CPT | Performed by: PHYSICIAN ASSISTANT

## 2020-12-04 PROCEDURE — 80048 BASIC METABOLIC PNL TOTAL CA: CPT | Performed by: PHYSICIAN ASSISTANT

## 2020-12-04 PROCEDURE — 36415 COLL VENOUS BLD VENIPUNCTURE: CPT | Performed by: PHYSICIAN ASSISTANT

## 2020-12-04 RX ORDER — ALBUTEROL SULFATE 90 UG/1
2 AEROSOL, METERED RESPIRATORY (INHALATION) EVERY 6 HOURS PRN
Qty: 1 INHALER | Refills: 1 | Status: SHIPPED | OUTPATIENT
Start: 2020-12-04 | End: 2023-08-01

## 2020-12-04 RX ORDER — LEVOCETIRIZINE DIHYDROCHLORIDE 5 MG/1
5 TABLET, FILM COATED ORAL EVERY EVENING
Qty: 90 TABLET | Refills: 3 | Status: SHIPPED | OUTPATIENT
Start: 2020-12-04 | End: 2023-08-01

## 2020-12-04 RX ORDER — EPINEPHRINE 0.3 MG/.3ML
0.3 INJECTION SUBCUTANEOUS PRN
Qty: 1 EACH | Refills: 1 | Status: SHIPPED | OUTPATIENT
Start: 2020-12-04 | End: 2023-01-30

## 2020-12-04 ASSESSMENT — MIFFLIN-ST. JEOR: SCORE: 1613.53

## 2020-12-04 NOTE — PROGRESS NOTES
SUBJECTIVE:   CC: Justyna Zhang is an 50 year old woman who presents for preventive health visit.     Patient has been advised of split billing requirements and indicates understanding: Yes  Healthy Habits:  Answers for HPI/ROS submitted by the patient on 12/3/2020   Annual Exam:  Frequency of exercise:: 1 day/week  Getting at least 3 servings of Calcium per day:: Yes  Diet:: Regular (no restrictions)  Taking medications regularly:: Yes  Medication side effects:: None  Bi-annual eye exam:: Yes  Dental care twice a year:: Yes  Sleep apnea or symptoms of sleep apnea:: Excessive snoring  abdominal pain: No  Blood in stool: No  Blood in urine: No  chest pain: No  chills: No  congestion: No  constipation: No  cough: No  diarrhea: No  dizziness: No  ear pain: No  eye pain: No  nervous/anxious: No  fever: No  frequency: No  genital sores: No  headaches: No  hearing loss: No  heartburn: No  arthralgias: No  joint swelling: No  peripheral edema: No  mood changes: No  myalgias: No  nausea: No  dysuria: No  palpitations: No  Skin sensation changes: No  sore throat: No  urgency: No  rash: No  shortness of breath: No  visual disturbance: No  weakness: No  pelvic pain: No  vaginal bleeding: No  vaginal discharge: No  tenderness: No  breast mass: No  breast discharge: No  Additional concerns today:: Yes  Duration of exercise:: Less than 15 minutes       declines HIV/hep, flu    Has not seen allergist in years  Had reaction to allergy shots - carried epi pen. But stopped allergy shots and it greatly reduced allergies  Seasonal asthma with allergies     says she snores a lot the past couple years  Not feeling rested after sleep. Fibromyalgia - doesn't get much sleep    Last period was in June  Some hot flashes  Left side of back  Hurts especially when sitting. Past couple months  Stress of changing jobs  Generalized muscle/joint pain with fibro. Bad this winter.    Today's PHQ-2 Score:   PHQ-2 ( 1999 [employer])  12/3/2020 4/5/2017   Q1: Little interest or pleasure in doing things 0 0   Q2: Feeling down, depressed or hopeless 0 0   PHQ-2 Score 0 0   Q1: Little interest or pleasure in doing things Not at all -   Q2: Feeling down, depressed or hopeless Not at all -   PHQ-2 Score 0 -       Abuse: Current or Past(Physical, Sexual or Emotional)- No  Do you feel safe in your environment? Yes        Social History     Tobacco Use     Smoking status: Never Smoker     Smokeless tobacco: Never Used   Substance Use Topics     Alcohol use: Yes     Comment: monthly     If you drink alcohol do you typically have >3 drinks per day or >7 drinks per week? No                     Reviewed orders with patient.  Reviewed health maintenance and updated orders accordingly - Yes  Lab work is in process  Labs reviewed in EPIC  BP Readings from Last 3 Encounters:   12/04/20 133/89   12/05/19 119/83   05/03/17 120/84    Wt Readings from Last 3 Encounters:   12/10/20 99.8 kg (220 lb)   12/04/20 101.5 kg (223 lb 12.8 oz)   12/05/19 97.7 kg (215 lb 6.4 oz)                  Patient Active Problem List   Diagnosis     Chronic rhinitis     CARDIOVASCULAR SCREENING; LDL GOAL LESS THAN 160     Tinea pedis     Fibromyalgia     Environmental allergies     History reviewed. No pertinent surgical history.    Social History     Tobacco Use     Smoking status: Never Smoker     Smokeless tobacco: Never Used   Substance Use Topics     Alcohol use: Yes     Comment: monthly     Family History   Problem Relation Age of Onset     Unknown/Adopted Other            Mammogram Screening: Patient under age 50, mutual decision reflected in health maintenance.      Pertinent mammograms are reviewed under the imaging tab.  History of abnormal Pap smear: NO - age 30-65 PAP every 5 years with negative HPV co-testing recommended  PAP / HPV Latest Ref Rng & Units 12/4/2020 4/5/2017 8/7/2014   PAP - NIL NIL NIL   HPV 16 DNA NEG:Negative Negative Negative -   HPV 18 DNA NEG:Negative  "Negative Negative -   OTHER HR HPV NEG:Negative Negative Negative -     Reviewed and updated as needed this visit by clinical staff  Tobacco  Allergies  Meds  Problems  Med Hx  Surg Hx  Fam Hx  Soc Hx          Reviewed and updated as needed this visit by Provider  Tobacco  Allergies  Meds  Problems  Med Hx  Surg Hx  Fam Hx         History reviewed. No pertinent past medical history.   History reviewed. No pertinent surgical history.    ROS:  CONSTITUTIONAL: NEGATIVE for fever, chills, change in weight  INTEGUMENTARU/SKIN: NEGATIVE for worrisome rashes, moles or lesions  EYES: NEGATIVE for vision changes or irritation  ENT: NEGATIVE for ear, mouth and throat problems  RESP: NEGATIVE for significant cough or SOB  BREAST: NEGATIVE for masses, tenderness or discharge  CV: NEGATIVE for chest pain, palpitations or peripheral edema  GI: NEGATIVE for nausea, abdominal pain, heartburn, or change in bowel habits  : NEGATIVE for unusual urinary or vaginal symptoms. Periods are irregular.  MUSCULOSKELETAL: NEGATIVE for significant arthralgias or myalgia  NEURO: NEGATIVE for weakness, dizziness or paresthesias  PSYCHIATRIC: NEGATIVE for changes in mood or affect    OBJECTIVE:   /89   Pulse 92   Temp 98.1  F (36.7  C) (Tympanic)   Resp 15   Ht 1.599 m (5' 2.95\")   Wt 101.5 kg (223 lb 12.8 oz)   LMP 06/04/2020   SpO2 95%   BMI 39.70 kg/m    EXAM:  GENERAL: healthy, alert and no distress  EYES: Eyes grossly normal to inspection, PERRL and conjunctivae and sclerae normal  HENT: ear canals and TM's normal, nose and mouth without ulcers or lesions  NECK: no adenopathy, no asymmetry, masses, or scars and thyroid normal to palpation  RESP: lungs clear to auscultation - no rales, rhonchi or wheezes  BREAST: normal without masses, tenderness or nipple discharge and no palpable axillary masses or adenopathy  CV: regular rate and rhythm, normal S1 S2, no S3 or S4, no murmur, click or rub, no peripheral edema " and peripheral pulses strong  ABDOMEN: soft, nontender, no hepatosplenomegaly, no masses and bowel sounds normal   (female): normal female external genitalia, normal urethral meatus, vaginal mucosa, normal cervix/adnexa/uterus without masses or discharge  MS: no gross musculoskeletal defects noted, no edema  SKIN: no suspicious lesions or rashes  SKIN: POSITIVE Left nipple skin tag on stalk  NEURO: Normal strength and tone, mentation intact and speech normal  BACK: no CVA tenderness, no paralumbar tenderness  PSYCH: mentation appears normal, affect normal/bright  LYMPH: no cervical, supraclavicular, axillary, or inguinal adenopathy    After cleaning with alcohol and using a sharp iris scissors, 1 skin tags were removed without complication. Hemostasis achieved with pressure      Diagnostic Test Results:  Labs reviewed in Epic    ASSESSMENT/PLAN:     ASSESSMENT/PLAN:      ICD-10-CM    1. Routine general medical examination at a health care facility  Z00.00 Lipid panel reflex to direct LDL Fasting     Basic metabolic panel  (Ca, Cl, CO2, Creat, Gluc, K, Na, BUN)     HPV High Risk Types DNA Cervical   2. Screening for HIV (human immunodeficiency virus)  Z11.4    3. Need for hepatitis C screening test  Z11.59    4. Screening for malignant neoplasm of cervix  Z12.4 Pap imaged thin layer screen with HPV - recommended age 30 - 65 years (select HPV order below)   5. Multiple environmental allergies  Z91.09 EPINEPHrine (ANY BX GENERIC EQUIV) 0.3 MG/0.3ML injection 2-pack     levocetirizine (XYZAL ALLERGY 24HR) 5 MG tablet   6. Visit for screening mammogram  Z12.31 MA SCREENING DIGITAL BILAT - Future  (s+30)   7. Cough  R05 albuterol (PROAIR HFA/PROVENTIL HFA/VENTOLIN HFA) 108 (90 Base) MCG/ACT inhaler   8. Snores  R06.83 SLEEP EVALUATION & MANAGEMENT REFERRAL - ADULT -Other (Respond in commments) ([facility])   9. Vaginal discharge  N89.8 Wet prep   10. Fibromyalgia  M79.7 tiZANidine (ZANAFLEX) 4 MG tablet  "      Patient Instructions   Sleep clinic: [facility]: [phone]    Look into cymbalta, amitriptyline       Use the muscle relaxer tizanidine at night to help with muscle spasms as needed  Consider acupumcture, physical therapy  Follow up with me (virtual visit is fine) if you have worsening symptoms or you are interested in medication      Patient has been advised of split billing requirements and indicates understanding: Yes  COUNSELING:   Reviewed preventive health counseling, as reflected in patient instructions       Regular exercise       Healthy diet/nutrition       Consider Hep C screening for all patients one time for ages 18-79 years       HIV screeninx in teen years, 1x in adult years, and at intervals if high risk       (Tehel)menopause management       The 10-year ASCVD risk score (Fortino VAUGHN JrChester, et al., 2013) is: 0.9%    Values used to calculate the score:      Age: 50 years      Sex: Female      Is Non- : No      Diabetic: No      Tobacco smoker: No      Systolic Blood Pressure: 133 mmHg      Is BP treated: No      HDL Cholesterol: 61 mg/dL      Total Cholesterol: 183 mg/dL    Estimated body mass index is 39.7 kg/m  as calculated from the following:    Height as of this encounter: 1.599 m (5' 2.95\").    Weight as of this encounter: 101.5 kg (223 lb 12.8 oz).    Weight management plan: Discussed healthy diet and exercise guidelines    She reports that she has never smoked. She has never used smokeless tobacco.      Counseling Resources:  ATP IV Guidelines  Pooled Cohorts Equation Calculator  Breast Cancer Risk Calculator  BRCA-Related Cancer Risk Assessment: FHS-7 Tool  FRAX Risk Assessment  ICSI Preventive Guidelines  Dietary Guidelines for Americans, 2010  USDA's MyPlate  ASA Prophylaxis  Lung CA Screening    [facility]  "

## 2020-12-04 NOTE — PATIENT INSTRUCTIONS
Sleep clinic: [facility]: [phone]    Look into cymbalta, amitriptyline       Use the muscle relaxer tizanidine at night to help with muscle spasms as needed  Consider acupumcture, physical therapy  Follow up with me (virtual visit is fine) if you have worsening symptoms or you are interested in medication      Preventive Health Recommendations  Female Ages 40 to 49    Yearly exam:     See your health care provider every year in order to  1. Review health changes.   2. Discuss preventive care.    3. Review your medicines if your doctor prescribed any.      Get a Pap test every three years (unless you have an abnormal result and your provider advises testing more often).      If you get Pap tests with HPV test, you only need to test every 5 years, unless you have an abnormal result. You do not need a Pap test if your uterus was removed (hysterectomy) and you have not had cancer.      You should be tested each year for STDs (sexually transmitted diseases), if you're at risk.     Ask your doctor if you should have a mammogram.      Have a colonoscopy (test for colon cancer) if someone in your family has had colon cancer or polyps before age 50.       Have a cholesterol test every 5 years.       Have a diabetes test (fasting glucose) after age 45. If you are at risk for diabetes, you should have this test every 3 years.    Shots: Get a flu shot each year. Get a tetanus shot every 10 years.     Nutrition:     Eat at least 5 servings of fruits and vegetables each day.    Eat whole-grain bread, whole-wheat pasta and brown rice instead of white grains and rice.    Get adequate Calcium and Vitamin D.      Lifestyle    Exercise at least 150 minutes a week (an average of 30 minutes a day, 5 days a week). This will help you control your weight and prevent disease.    Limit alcohol to one drink per day.    No smoking.     Wear sunscreen to prevent skin cancer.    See your dentist every six  months for an exam and cleaning.

## 2020-12-08 LAB
COPATH REPORT: NORMAL
PAP: NORMAL

## 2020-12-10 VITALS — HEIGHT: 64 IN | BODY MASS INDEX: 37.56 KG/M2 | WEIGHT: 220 LBS

## 2020-12-10 LAB
FINAL DIAGNOSIS: NORMAL
HPV HR 12 DNA CVX QL NAA+PROBE: NEGATIVE
HPV16 DNA SPEC QL NAA+PROBE: NEGATIVE
HPV18 DNA SPEC QL NAA+PROBE: NEGATIVE
SPECIMEN DESCRIPTION: NORMAL
SPECIMEN SOURCE CVX/VAG CYTO: NORMAL

## 2020-12-10 ASSESSMENT — MIFFLIN-ST. JEOR: SCORE: 1604.97

## 2020-12-10 NOTE — PROGRESS NOTES
"Justyna Zhang is a 50 year old female who is being evaluated via a billable video visit.      The patient has been notified of following:     \"This video visit will be conducted via a call between you and your physician/provider. We have found that certain health care needs can be provided without the need for an in-person physical exam.  This service lets us provide the care you need with a video conversation.  If a prescription is necessary we can send it directly to your pharmacy.  If lab work is needed we can place an order for that and you can then stop by our lab to have the test done at a later time.    Video visits are billed at different rates depending on your insurance coverage.  Please reach out to your insurance provider with any questions.    If during the course of the call the physician/provider feels a video visit is not appropriate, you will not be charged for this service.\"    Patient has given verbal consent for Video visit? Yes  How would you like to obtain your AVS? MyChart  If you are dropped from the video visit, the video invite should be resent to: Text to cell phone: [phone]  Will anyone else be joining your video visit? No        Video-Visit Details    Type of service:  Video Visit    Video Start Time: 8:28AM  Video End Time:  8:51AM    Originating Location (pt. Location): Home    Distant Location (provider location):  [facility]     Platform used for Video Visit: Adam Velásquez MD  [facility]   Floor 1, [address]   [address] [place], [place] [address]. [place] [address]   Appointments: [phone]      Sleep Consultation Note:    Date on this visit: 12/11/2020    Justyna Zhang is sent by Judy David for a sleep consultation regarding snoring, evaluation for possible sleep apnea.    Primary Physician: No Ref-Primary, Physician     Chief complaint: " snoring    Justnya Zhang 50 year old with PMH of fibromyalgia, obesity who complains of snoring.  She has not had a previous sleep study.    Sleep Disordered Breathing  Justyna reports snoring, snort arousals,  dry mouth, morning headaches, jaw discomfort, non-refreshing sleep, daytime sleepiness/fatigue.  Denies choking/gasping for air, witnessed apneas    Sleep Schedule/Sleep Complaints//Daytime Functioning  During workdays, Justyna goes to bed at 1-2 AM and wakes up at 730 AM with an alarm.  It usually takes 20-60 minutes to fall asleep.  On non-work days, She falls asleep at 1-2 AM and gets up 7-9AM.  She wakes up 2-3 times throughout the night.  Night time awakenings occurs due to snoring, tossing and turning  After awakening, She is able to fall back asleep after 10-30 minutes.  She reports non-refreshing sleep, daytime sleepiness/fatigue.  Patient  reports drowsiness while driving if she drives for more than 30 minutes.  She has  occasionally drifted off the central line, but fortunately has not met with any motor vehicle accident because of drowsiness while driving.    Justyna naps 1-2 times per month for 10-20 minutes, feels refreshed after naps.    Patient occasionally reads and uses electronics in bed.    SLEEP SCALES:  Patient's [place] Sleepiness score 7/24   Insomnia severity index:16    Restless Legs symptoms  Justyna does not complain of restlessness feelings in the legs.      Sleep Behaviors  She denies any cataplexy, sleep paralysis, sleep hallucinations.  She denies sleep eating.  She used to sleep walk as a child but not as an adult.  She reports occasional sleep talking.  She does not complain acting out dreams.      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   . Lives with  and their 22 yr old daughter  She drinks >3 cups of coffee/day. Last caffeine intake is sometimes within 6 hours of bed time.  She drinks alcohol, occasionally .  Does not use  alcohol as a sleep aid.  Patient is a never smoker. Patient does not use drugs.      Allergies:    Allergies   Allergen Reactions     Shellfish-Derived Products Anaphylaxis       Medications:    Current Outpatient Medications   Medication Sig Dispense Refill     albuterol (PROAIR HFA/PROVENTIL HFA/VENTOLIN HFA) 108 (90 Base) MCG/ACT inhaler Inhale 2 puffs into the lungs every 6 hours as needed for shortness of breath / dyspnea or wheezing 1 Inhaler 1     EPINEPHrine (ANY BX GENERIC EQUIV) 0.3 MG/0.3ML injection 2-pack Inject 0.3 mLs (0.3 mg) into the muscle as needed for anaphylaxis 1 each 1     levocetirizine (XYZAL ALLERGY 24HR) 5 MG tablet Take 1 tablet (5 mg) by mouth every evening 90 tablet 3     tiZANidine (ZANAFLEX) 4 MG tablet Take 1 tablet (4 mg) by mouth nightly as needed for muscle spasms 30 tablet 1     fluticasone (FLONASE) 50 MCG/ACT spray Spray 1-2 sprays into both nostrils daily (Patient not taking: Reported on 12/5/2019) 3 Bottle 1     furosemide (LASIX) 20 MG tablet Take 20 mg by mouth         Problem List:  Patient Active Problem List    Diagnosis Date Noted     Chronic rhinitis 04/05/2017     Priority: Medium     CARDIOVASCULAR SCREENING; LDL GOAL LESS THAN 160 04/05/2017     Priority: Medium     Tinea pedis 01/21/2016     Priority: Medium     Fibromyalgia 08/07/2009     Priority: Medium     Environmental allergies 08/07/2009     Priority: Medium     Overview:   Reacted to allergy injections, was advised to carry epipen for environmental allergies  Reacted to allergy injections, was advised to carry epipen for environmental allergies          Past Medical/Surgical History:  (Per care everywhere)   Date Site/Laterality Comments   TYMPANOSTOMY     as a child     pubovaginal sling 2004   & 8/11/10 Dr. Leon       Medical History    Medical History Date Comments   Fibromyalgia 8/7/2009     Obesity, unspecified 8/7/2009     Environmental allergies 8/7/2009         Social History:  Social History      Socioeconomic History     Marital status:      Spouse name: Not on file     Number of children: Not on file     Years of education: Not on file     Highest education level: Not on file   Occupational History     Not on file   Social Needs     Financial resource strain: Not on file     Food insecurity     Worry: Not on file     Inability: Not on file     Transportation needs     Medical: Not on file     Non-medical: Not on file   Tobacco Use     Smoking status: Never Smoker     Smokeless tobacco: Never Used   Substance and Sexual Activity     Alcohol use: Yes     Comment: monthly     Drug use: No     Sexual activity: Yes     Birth control/protection: Male Surgical   Lifestyle     Physical activity     Days per week: Not on file     Minutes per session: Not on file     Stress: Not on file   Relationships     Social connections     Talks on phone: Not on file     Gets together: Not on file     Attends Confucianist service: Not on file     Active member of club or organization: Not on file     Attends meetings of clubs or organizations: Not on file     Relationship status: Not on file     Intimate partner violence     Fear of current or ex partner: Not on file     Emotionally abused: Not on file     Physically abused: Not on file     Forced sexual activity: Not on file   Other Topics Concern     Parent/sibling w/ CABG, MI or angioplasty before 65F 55M? No   Social History Narrative     Not on file       Family History:  Family history pertinent to sleep disorders: unknown, adopted  Family History   Problem Relation Age of Onset     Unknown/Adopted Other        Review of Systems:  A complete review of systems reviewed by me is negative with the exeption of what has been mentioned in the history of present illness,  and symptoms listed below, highlighted in red:  CONSTITUTIONAL: weight gain/loss, fever, chills, sweats or night sweats, drug allergies.  EYES:  changes in vision, blind spots, double vision.  ENT: ear  "pain, sore throat, sinus pain, post-nasal drip, runny nose, bloody nose  CARDIAC:  fast heartbeats or fluttering in chest, chest pain or pressure, breathlessness when lying flat, swollen legs or swollen feet.  NEUROLOGIC: headaches, weakness or numbness in the arms or legs.  DERMATOLOGIC:  rashes, new moles or change in mole(s)  PULMONARY: SOB at rest, SOB with activity, dry cough, productive cough, coughing up blood, wheezing or whistling when breathing.    GASTROINTESTINAL:  nausea or vomitting, loose or watery stools, fat or grease in stools, constipation, abdominal pain, bowel movements black in color or blood noted.  GENITOURINARY: pain during urination, blood in urine, urinating more frequently than usual  MUSCULOSKELETAL:muscle pain, bone or joint pain, swollen joints.  ENDOCRINE: increased thirst or urination, diabetes.  LYMPHATICS: swollen lymph nodes, lumps or bumps in the breasts or nipple discharge.  PSYCHE: depression, anxiety, denies suicidal ideations/thoughts of harming others    Physical Examination:  Vitals: Ht 1.613 m (5' 3.5\")   Wt 99.8 kg (220 lb)   BMI 38.36 kg/m    BMI= Body mass index is 38.36 kg/m .    Meherrin Total Score 12/10/2020   Total score - Meherrin 7     General: No apparent distress, appropriately groomed  Head: Normocephalic, atraumatic  Eyes: no icterus   Neck:Circumference:15 inches  Chest: No cough, no audible wheezing, able to talk in full sentences  Skin: no rash  Psych: coherent speech, normal rate and volume, able to articulate logical thoughts, able   to abstract reason, no tangential thoughts, no hallucinations   or delusions  Her affect is normal  Neuro:  Mental status: Alert and  Oriented X 3  Speech: normal     OTHER TESTS:  Last Comprehensive Metabolic Panel:  Sodium   Date Value Ref Range Status   12/04/2020 138 133 - 144 mmol/L Final     Potassium   Date Value Ref Range Status   12/04/2020 3.9 3.4 - 5.3 mmol/L Final     Chloride   Date Value Ref Range Status "   12/04/2020 106 94 - 109 mmol/L Final     Carbon Dioxide   Date Value Ref Range Status   12/04/2020 25 20 - 32 mmol/L Final     Anion Gap   Date Value Ref Range Status   12/04/2020 7 3 - 14 mmol/L Final     Glucose   Date Value Ref Range Status   12/04/2020 82 70 - 99 mg/dL Final     Comment:     Fasting specimen     Urea Nitrogen   Date Value Ref Range Status   12/04/2020 13 7 - 30 mg/dL Final     Creatinine   Date Value Ref Range Status   12/04/2020 0.77 0.52 - 1.04 mg/dL Final     GFR Estimate   Date Value Ref Range Status   12/04/2020 >90 >60 mL/min/[1.73_m2] Final     Comment:     Non  GFR Calc  Starting 12/18/2018, serum creatinine based estimated GFR (eGFR) will be   calculated using the Chronic Kidney Disease Epidemiology Collaboration   (CKD-EPI) equation.       Calcium   Date Value Ref Range Status   12/04/2020 8.9 8.5 - 10.1 mg/dL Final     ALT   Date Value Ref Range Status   12/05/2019 29 0 - 50 U/L Final     AST   Date Value Ref Range Status   12/05/2019 20 0 - 45 U/L Final     Lipid panel:  Lab Test 12/04/20  0950   CHOL 183   HDL 61   LDL 81   TRIG 203*   CHOLHDLRATIO  --        Impression/Report/ Plan:  Snoring,  non restorative sleep, fatigue. Possible Obstructive sleep apnea  STOP BANG score is 3/8. Patient likely has sleep apnea based on clinical history and body habitus.  HST/ Polysomnography reviewed.  Recommended  in-lab sleep study to evaluate for LOUISA due to low STOP-BANG score.   Obstructive sleep apnea reviewed.  Complications of untreated sleep apnea were reviewed .   Treatment options for LOUISA were discussed briefly and patient was provided detailed information as after visit summary.     Insomnia -multifactorial.  Psychophysiological, circadian rhythm sleep wake disorder/delayed sleep phase, inadequate sleep hygiene  We discussed stimulus measures. Encouraged to follow good sleep hygiene/behavioral techniques.  Delayed sleep phase: We discussed regularizing sleep schedule  arriving at a goal bedtime of 12 midnight and goal wake up time at 7:30 AM.  We discussed minimizing exposure to bright lights after 9 PM.  She was  instructed to avoid mind stimulating activities and screen time before bed.  Recommended 1/2 to 1 tablet of 1 mg melatonin to be taken around 9 PM(patient's habitual sleep time is 2 AM).  Recommend exposure to bright light using a bright light box  of 10,000 lux intensity soon after awakening for 30 to 60 minutes to help with sleep phase advancement.     Chronic Insufficient sleep: We discussed the consequences of insufficient sleep, the link between chronic sleep deprivation and poor health outcomes/ increased morbidity and mortality.  Recommended to follow regular sleep schedule (detailed above) and aim at obtaining at least 7 to 8 hours per night and avoid sleep deprivation    Obesity: We discussed weight management with healthy diet, and exercise.    Patient was strongly advised to avoid driving, operating any heavy machinery or other hazardous situations while drowsy or sleepy.  Patient was counseled on the importance of driving while alert, to pull over if drowsy, or nap before getting into the vehicle if sleepy.      Plan is to communicate results of sleep study in 10-14 days.     CC: Judy David    The above note was dictated using voice recognition software. Although reviewed after completion, some word and grammatical error may remain . Please contact the author for any clarifications.     Cassius Velásquez MD  [facility]   Floor 1, [address]   [address]    [address]   Appointments: [phone]

## 2020-12-10 NOTE — PATIENT INSTRUCTIONS
Your BMI is Body mass index is 38.36 kg/m .  Weight management is a personal decision.  If you are interested in exploring weight loss strategies, the following discussion covers the approaches that may be successful. Body mass index (BMI) is one way to tell whether you are at a healthy weight, overweight, or obese. It measures your weight in relation to your height.  A BMI of 18.5 to 24.9 is in the healthy range. A person with a BMI of 25 to 29.9 is considered overweight, and someone with a BMI of 30 or greater is considered obese. More than two-thirds of American adults are considered overweight or obese.  Being overweight or obese increases the risk for further weight gain. Excess weight may lead to heart disease and diabetes.  Creating and following plans for healthy eating and physical activity may help you improve your health.  Weight control is part of healthy lifestyle and includes exercise, emotional health, and healthy eating habits. Careful eating habits lifelong are the mainstay of weight control. Though there are significant health benefits from weight loss, long-term weight loss with diet alone may be very difficult to achieve- studies show long-term success with dietary management in less than 10% of people. Attaining a healthy weight may be especially difficult to achieve in those with severe obesity. In some cases, medications, devices and surgical management might be considered.  What can you do?  If you are overweight or obese and are interested in methods for weight loss, you should discuss this with your provider.     Consider reducing daily calorie intake by 500 calories.     Keep a food journal.     Avoiding skipping meals, consider cutting portions instead.    Diet combined with exercise helps maintain muscle while optimizing fat loss. Strength training is particularly important for building and maintaining muscle mass. Exercise helps reduce stress, increase energy, and improves fitness.  "Increasing exercise without diet control, however, may not burn enough calories to loose weight.       Start walking three days a week 10-20 minutes at a time    Work towards walking thirty minutes five days a week     Eventually, increase the speed of your walking for 1-2 minutes at time    In addition, we recommend that you review healthy lifestyles and methods for weight loss available through the National Institutes of Health patient information sites:  http://win.niddk.nih.gov/publications/index.htm    And look into health and wellness programs that may be available through your health insurance provider, employer, local community center, or [employer].    Weight management plan: Patient was referred to their PCP to discuss a diet and exercise plan.    MY TREATMENT INFORMATION FOR SLEEP APNEA-  Justyna Zhang    DOCTOR : Csasius Velásquez MD    Am I having a sleep study at a sleep center?  --->Due to insurance clearance delays, you will be contacted within 2-4 weeks to schedule    Am I having a home sleep study?  --->Watch the video for the device you are using:    /drop off device-   https://www.Eversight.com/watch?v=yGGFBdELGhk    Disposable device sent out require phone/computer application-   https://www.[employer].com/watch?v=BCce_vbiwxE      Frequently asked questions:  1. What is Obstructive Sleep Apnea (LOUISA)? LOUISA is the most common type of sleep apnea. Apnea means, \"without breath.\"  Apnea is most often caused by narrowing or collapse of the upper airway as muscles relax during sleep.   Almost everyone has occasional apneas. Most people with sleep apnea have had brief interruptions at night frequently for many years.  The severity of sleep apnea is related to how frequent and severe the events are.   2. What are the consequences of LOUISA? Symptoms include: feeling sleepy during the day, snoring loudly, gasping or stopping of breathing, trouble sleeping, and occasionally morning " headaches or heartburn at night.  Sleepiness can be serious and even increase the risk of falling asleep while driving. Other health consequences may include development of high blood pressure and other cardiovascular disease in persons who are susceptible. Untreated LOUISA  can contribute to heart disease, stroke and diabetes.   3. What are the treatment options? In most situations, sleep apnea is a lifelong disease that must be managed with daily therapy. Medications are not effective for sleep apnea and surgery is generally not considered until other therapies have been tried. Your treatment is your choice . Continuous Positive Airway (CPAP) works right away and is the therapy that is effective in nearly everyone. An oral device to hold your jaw forward is usually the next most reliable option. Other options include postioning devices (to keep you off your back), weight loss, and surgery including a tongue pacing device. There is more detail about some of these options below.  4. Are my sleep studies covered by insurance? Although we will request verification of coverage, we advise you also check in advance of the study to ensure there is coverage.    Important tips for those choosing CPAP and similar devices   Know your equipment:  CPAP is continuous positive airway pressure that prevents obstructive sleep apnea by keeping the throat from collapsing while you are sleeping. In most cases, the device is  smart  and can slowly self-adjusts if your throat collapses and keeps a record every day of how well you are treated-this information is available to you and your care team.  BPAP is bilevel positive airway pressure that keeps your throat open and also assists each breath with a pressure boost to maintain adequate breathing.  Special kinds of BPAP are used in patients who have inadequate breathing from lung or heart disease. In most cases, the device is  smart  and can slowly self-adjusts to assist breathing. Like  CPAP, the device keeps a record of how well you are treated.  Your mask is your connection to the device. You get to choose what feels most comfortable and the staff will help to make sure if fits. Here: are some examples of the different masks that are available:       Key points to remember on your journey with sleep apnea:  1. Sleep study.  PAP devices often need to be adjusted during a sleep study to show that they are effective and adjusted right.  2. Good tips to remember: Try wearing just the mask during a quiet time during the day so your body adapts to wearing it. A humidifier is recommended for comfort in most cases to prevent drying of your nose and throat. Allergy medication from your provider may help you if you are having nasal congestion.  3. Getting settled-in. It takes more than one night for most of us to get used to wearing a mask. Try wearing just the mask during a quiet time during the day so your body adapts to wearing it. A humidifier is recommended for comfort in most cases. Our team will work with you carefully on the first day and will be in contact within 4 days and again at 2 and 4 weeks for advice and remote device adjustments. Your therapy is evaluated by the device each day.   4. Use it every night. The more you are able to sleep naturally for 7-8 hours, the more likely you will have good sleep and to prevent health risks or symptoms from sleep apnea. Even if you use it 4 hours it helps. Occasionally all of us are unable to use a medical therapy, in sleep apnea, it is not dangerous to miss one night.   5. Communicate. Call our skilled team on the number provided on the first day if your visit for problems that make it difficult to wear the device. Over 2 out of 3 patients can learn to wear the device long-term with help from our team. Remember to call our team or your sleep providers if you are unable to wear the device as we may have other solutions for those who cannot adapt to mask  CPAP therapy. It is recommended that you sleep your sleep provider within the first 3 months and yearly after that if you are not having problems.   6. Use it for your health. We encourage use of CPAP masks during daytime quiet periods to allow your face and brain to adapt to the sensation of CPAP so that it will be a more natural sensation to awaken to at night or during naps. This can be very useful during the first few weeks or months of adapting to CPAP though it does not help medically to wear CPAP during wakefulness and  should not be used as a strategy just to meet guidelines.  7. Take care of your equipment. Make sure you clean your mask and tubing using directions every day and that your filter and mask are replaced as recommended or if they are not working.     BESIDES CPAP, WHAT OTHER THERAPIES ARE THERE?    Positioning Device  Positioning devices are generally used when sleep apnea is mild and only occurs on your back.This example shows a pillow that straps around the waist. It may be appropriate for those whose sleep study shows milder sleep apnea that occurs primarily when lying flat on one's back. Preliminary studies have shown benefit but effectiveness at home may need to be verified by a home sleep test. These devices are generally not covered by medical insurance.  Examples of devices that maintain sleeping on the back to prevent snoring and mild sleep apnea.    Belt type body positioner  http://[employer]/    Electronic reminder  http://nightshifttherapy.com/  http://[email or web address]/      Oral Appliance  What is oral appliance therapy?  An oral appliance device fits on your teeth at night like a retainer used after having braces. The device is made by a specialized dentist and requires several visits over 1-2 months before a manufactured device is made to fit your teeth and is adjusted to prevent your sleep apnea. Once an oral device is working properly, snoring should be improved. A home  sleep test may be recommended at that time if to determine whether the sleep apnea is adequately treated.       Some things to remember:  -Oral devices are often, but not always, covered by your medical insurance. Be sure to check with your insurance provider.   -If you are referred for oral therapy, you will be given a list of specialized dentists to consider or you may choose to visit the Web site of the American Academy of Dental Sleep Medicine  -Oral devices are less likely to work if you have severe sleep apnea or are extremely overweight.     More detailed information  An oral appliance is a small acrylic device that fits over the upper and lower teeth  (similar to a retainer or a mouth guard). This device slightly moves jaw forward, which moves the base of the tongue forward, opens the airway, improves breathing for effective treat snoring and obstructive sleep apnea in perhaps 7 out of 10 people .  The best working devices are custom-made by a dental device  after a mold is made of the teeth 1, 2, 3.  When is an oral appliance indicated?  Oral appliance therapy is recommended as a first-line treatment for patients with primary snoring, mild sleep apnea, and for patients with moderate sleep apnea who prefer appliance therapy to use of CPAP4, 5. Severity of sleep apnea is determined by sleep testing and is based on the number of respiratory events per hour of sleep.   How successful is oral appliance therapy?  The success rate of oral appliance therapy in patients with mild sleep apnea is 75-80% while in patients with moderate sleep apnea it is 50-70%. The chance of success in patients with severe sleep apnea is 40-50%. The research also shows that oral appliances have a beneficial effect on the cardiovascular health of LOUISA patients at the same magnitude as CPAP therapy7.  Oral appliances should be a second-line treatment in cases of severe sleep apnea, but if not completely successful then a  combination therapy utilizing CPAP plus oral appliance therapy may be effective. Oral appliances tend to be effective in a broad range of patients although studies show that the patients who have the highest success are females, younger patients, those with milder disease, and less severe obesity. 3, 6.   Finding a dentist that practices dental sleep medicine  Specific training is available through the American Academy of Dental Sleep Medicine for dentists interested in working in the field of sleep. To find a dentist who is educated in the field of sleep and the use of oral appliances, near you, visit the Web site of the American Academy of Dental Sleep Medicine.    References  1. Sue et al. Objectively measured vs self-reported compliance during oral appliance therapy for sleep-disordered breathing. Chest 2013; 144(5): 9964-9159.  2. Heath et al. Objective measurement of compliance during oral appliance therapy for sleep-disordered breathing. Thorax 2013; 68(1): 91-96.  3. Ramiro, et al. Mandibular advancement devices in 620 men and women with LOUISA and snoring: tolerability and predictors of treatment success. Chest 2004; 125: 9620-4370.  4. Bobby, et al. Oral appliances for snoring and LOUISA: a review. Sleep 2006; 29: 244-262.  5. Molly, et al. Oral appliance treatment for LOUISA: an update. J Clin Sleep Med 2014; 10(2): 215-227.  6. Billy et al. Predictors of OSAH treatment outcome. J Dent Res 2007; 86: 3239-3719.      Weight Loss:    Weight loss is a long-term strategy that may improve sleep apnea in some patients.    Weight management is a personal decision and the decision should be based on your interest and the potential benefits.  If you are interested in exploring weight loss strategies, the following discussion covers the impact on weight loss on sleep apnea and the approaches that may be successful.    Being overweight does not necessarily mean you will have health consequences.   Those who have BMI over 35 or over 27 with existing medical conditions carries greater risk.   Weight loss decreases severity of sleep apnea in most people with obesity. For those with mild obesity who have developed snoring with weight gain, even 15-30 pound weight loss can improve and occasionally eliminate sleep apnea.  Structured and life-long dietary and health habits are necessary to lose weight and keep healthier weight levels.     Though there may be significant health benefits from weight loss, long-term weight loss is very difficult to achieve- studies show success with dietary management in less than 10% of people. In addition, substantial weight loss may require years of dietary control and may be difficult if patients have severe obesity. In these cases, surgical management may be considered.  Finally, older individuals who have tolerated obesity without health complications may be less likely to benefit from weight loss strategies.        Your BMI is Body mass index is 38.36 kg/m .  Weight management is a personal decision.  If you are interested in exploring weight loss strategies, the following discussion covers the approaches that may be successful. Body mass index (BMI) is one way to tell whether you are at a healthy weight, overweight, or obese. It measures your weight in relation to your height.  A BMI of 18.5 to 24.9 is in the healthy range. A person with a BMI of 25 to 29.9 is considered overweight, and someone with a BMI of 30 or greater is considered obese. More than two-thirds of American adults are considered overweight or obese.  Being overweight or obese increases the risk for further weight gain. Excess weight may lead to heart disease and diabetes.  Creating and following plans for healthy eating and physical activity may help you improve your health.  Weight control is part of healthy lifestyle and includes exercise, emotional health, and healthy eating habits. Careful eating habits  lifelong are the mainstay of weight control. Though there are significant health benefits from weight loss, long-term weight loss with diet alone may be very difficult to achieve- studies show long-term success with dietary management in less than 10% of people. Attaining a healthy weight may be especially difficult to achieve in those with severe obesity. In some cases, medications, devices and surgical management might be considered.  What can you do?  If you are overweight or obese and are interested in methods for weight loss, you should discuss this with your provider.     Consider reducing daily calorie intake by 500 calories.     Keep a food journal.     Avoiding skipping meals, consider cutting portions instead.    Diet combined with exercise helps maintain muscle while optimizing fat loss. Strength training is particularly important for building and maintaining muscle mass. Exercise helps reduce stress, increase energy, and improves fitness. Increasing exercise without diet control, however, may not burn enough calories to loose weight.       Start walking three days a week 10-20 minutes at a time    Work towards walking thirty minutes five days a week     Eventually, increase the speed of your walking for 1-2 minutes at time    In addition, we recommend that you review healthy lifestyles and methods for weight loss available through the National Institutes of Health patient information sites:  http://win.niddk.nih.gov/publications/index.htm    And look into health and wellness programs that may be available through your health insurance provider, employer, local community center, or [employer].    Surgery:    Surgery for obstructive sleep apnea is considered generally only when other therapies fail to work. Surgery may be discussed with you if you are having a difficult time tolerating CPAP and or when there is an abnormal structure that requires surgical correction.  Nose and throat surgeries often  enlarge the airway to prevent collapse.  Most of these surgeries create pain for 1-2 weeks and up to half of the most common surgeries are not effective throughout life.  You should carefully discuss the benefits and drawbacks to surgery with your sleep provider and surgeon to determine if it is the best solution for you.   More information  Surgery for LOUISA is directed at areas that are responsible for narrowing or complete obstruction of the airway during sleep.  There are a wide range of procedures available to enlarge and/or stabilize the airway to prevent blockage of breathing in the three major areas where it can occur: the palate, tongue, and nasal regions.  Successful surgical treatment depends on the accurate identification of the factors responsible for obstructive sleep apnea in each person.  A personalized approach is required because there is no single treatment that works well for everyone.  Because of anatomic variation, consultation with an examination by a sleep surgeon is a critical first step in determining what surgical options are best for each patient.  In some cases, examination during sedation may be recommended in order to guide the selection of procedures.  Patients will be counseled about risks and benefits as well as the typical recovery course after surgery. Surgery is typically not a cure for a person s LOUISA.  However, surgery will often significantly improve one s LOUISA severity (termed  success rate ).  Even in the absence of a cure, surgery will decrease the cardiovascular risk associated with OSA7; improve overall quality of life8 (sleepiness, functionality, sleep quality, etc).      Palate Procedures:  Patients with LOUISA often have narrowing of their airway in the region of their tonsils and uvula.  The goals of palate procedures are to widen the airway in this region as well as to help the tissues resist collapse.  Modern palate procedure techniques focus on tissue conservation and  soft tissue rearrangement, rather than tissue removal.  Often the uvula is preserved in this procedure. Residual sleep apnea is common in patient after pharyngoplasty with an average reduction in sleep apnea events of 33%2.      Tongue Procedures:  ExamWhile patients are awake, the muscles that surround the throat are active and keep this region open for breathing. These muscles relax during sleep, allowing the tongue and other structures to collapse and block breathing.  There are several different tongue procedures available.  Selection of a tongue base procedure depends on characteristics seen on physical exam.  Generally, procedures are aimed at removing bulky tissues in this area or preventing the back of the tongue from falling back during sleep.  Success rates for tongue surgery range from 50-62%3.    Hypoglossal Nerve Stimulation:  Hypoglossal nerve stimulation has recently received approval from the [place] Food and Drug Administration for the treatment of obstructive sleep apnea.  This is based on research showing that the system was safe and effective in treating sleep apnea6.  Results showed that the median AHI score decreased 68%, from 29.3 to 9.0. This therapy uses an implant system that senses breathing patterns and delivers mild stimulation to airway muscles, which keeps the airway open during sleep.  The system consists of three fully implanted components: a small generator (similar in size to a pacemaker), a breathing sensor, and a stimulation lead.  Using a small handheld remote, a patient turns the therapy on before bed and off upon awakening.    Candidates for this device must be greater than 22 years of age, have moderate to severe LOUISA (AHI between 20-65), BMI less than 32, have tried CPAP/oral appliance without success, and have appropriate upper airway anatomy (determined by a sleep endoscopy performed by Dr. Anaya).    Hypoglossal Nerve Stimulation Pathway:    The sleep surgeon s office  will work with the patient through the insurance prior-authorization process (including communications and appeals).    Nasal Procedures:  Nasal obstruction can interfere with nasal breathing during the day and night.  Studies have shown that relief of nasal obstruction can improve the ability of some patients to tolerate positive airway pressure therapy for obstructive sleep apnea1.  Treatment options include medications such as nasal saline, topical corticosteroid and antihistamine sprays, and oral medications such as antihistamines or decongestants. Non-surgical treatments can include external nasal dilators for selected patients. If these are not successful by themselves, surgery can improve the nasal airway either alone or in combination with these other options.      Combination Procedures:  Combination of surgical procedures and other treatments may be recommended, particularly if patients have more than one area of narrowing or persistent positional disease.  The success rate of combination surgery ranges from 66-80%2,3.    References  1. Freya GRIMALDO. The Role of the Nose in Snoring and Obstructive Sleep Apnoea: An Update.  Eur Arch Otorhinolaryngol. 2011; 268: 1365-73.  2.  Brynn SM; Joaquin JA; Negro JR; Pallanch JF; Johnny MB; Dilip SG; Zeb AGUILERA. Surgical modifications of the upper airway for obstructive sleep apnea in adults: a systematic review and meta-analysis. SLEEP 2010;33(10):0055-1691. Adebayo CAMARILLO. Hypopharyngeal surgery in obstructive sleep apnea: an evidence-based medicine review.  Arch Otolaryngol Head Neck Surg. 2006 Feb;132(2):206-13.  3. Niraj YH1, Mary Y, Monico MARJAN. The efficacy of anatomically based multilevel surgery for obstructive sleep apnea. Otolaryngol Head Neck Surg. 2003 Oct;129(4):327-35.  4. Adebayo CAMARILLO, Goldberg A. Hypopharyngeal Surgery in Obstructive Sleep Apnea: An Evidence-Based Medicine Review. Arch Otolaryngol Head Neck Surg. 2006 Feb;132(2):206-13.  5. Kay MADRIGAL et al.  Upper-Airway Stimulation for Obstructive Sleep Apnea.  N Engl J Med. 2014 Jan 9;370(2):139-49.  6. Storm Y et al. Increased Incidence of Cardiovascular Disease in Middle-aged Men with Obstructive Sleep Apnea. Am J Respir Crit Care Med; 2002 166: 159-165  7. Hodan CHERRY et al. Studying Life Effects and Effectiveness of Palatopharyngoplasty (SLEEP) study: Subjective Outcomes of Isolated Uvulopalatopharyngoplasty. Otolaryngol Head Neck Surg. 2011; 144: 623-631  Regularizing sleep schedule   We discussed regularizing sleep schedule arriving at a goal bedtime of 12 midnight and goal wake up time at 7:30 AM, with the goal of obtaining at least 7 to 8 hours of sleep per night and avoid sleep deprivation.  Please minimize exposure to bright lights after 9 PM and  avoid mind stimulating activities and screen time before bed.  Recommend  1/2 to 1 tablet of 1 mg melatonin(over the counter) to be taken around 9 PM.  Recommend exposure to bright light using a bright light box  of 10,000 lux intensity(can be purchased through online resources including [employer]) soon after awakening for 30 to 60 minutes to help with sleep phase advancement.

## 2020-12-11 ENCOUNTER — VIRTUAL VISIT (OUTPATIENT)
Dept: SLEEP MEDICINE | Facility: CLINIC | Age: 50
End: 2020-12-11
Attending: PHYSICIAN ASSISTANT
Payer: COMMERCIAL

## 2020-12-11 DIAGNOSIS — R06.83 SNORES: ICD-10-CM

## 2020-12-11 DIAGNOSIS — G47.9 SLEEP DISTURBANCE: Primary | ICD-10-CM

## 2020-12-11 PROCEDURE — 99203 OFFICE O/P NEW LOW 30 MIN: CPT | Mod: TEL | Performed by: INTERNAL MEDICINE

## 2021-03-25 ENCOUNTER — IMMUNIZATION (OUTPATIENT)
Dept: NURSING | Facility: CLINIC | Age: 51
End: 2021-03-25
Payer: COMMERCIAL

## 2021-03-25 PROCEDURE — 0001A PR COVID VAC PFIZER DIL RECON 30 MCG/0.3 ML IM: CPT

## 2021-03-25 PROCEDURE — 91300 PR COVID VAC PFIZER DIL RECON 30 MCG/0.3 ML IM: CPT

## 2021-03-29 ENCOUNTER — OFFICE VISIT (OUTPATIENT)
Dept: FAMILY MEDICINE | Facility: CLINIC | Age: 51
End: 2021-03-29
Payer: COMMERCIAL

## 2021-03-29 VITALS
WEIGHT: 226 LBS | SYSTOLIC BLOOD PRESSURE: 104 MMHG | TEMPERATURE: 98.1 F | HEART RATE: 90 BPM | RESPIRATION RATE: 16 BRPM | BODY MASS INDEX: 38.58 KG/M2 | DIASTOLIC BLOOD PRESSURE: 70 MMHG | HEIGHT: 64 IN | OXYGEN SATURATION: 95 %

## 2021-03-29 DIAGNOSIS — L03.031 PARONYCHIA OF TOE, RIGHT: ICD-10-CM

## 2021-03-29 DIAGNOSIS — L60.0 INGROWN TOENAIL: Primary | ICD-10-CM

## 2021-03-29 PROBLEM — Z13.6 CARDIOVASCULAR SCREENING; LDL GOAL LESS THAN 160: Status: RESOLVED | Noted: 2017-04-05 | Resolved: 2021-03-29

## 2021-03-29 PROCEDURE — 99213 OFFICE O/P EST LOW 20 MIN: CPT | Performed by: PHYSICIAN ASSISTANT

## 2021-03-29 ASSESSMENT — MIFFLIN-ST. JEOR: SCORE: 1632.19

## 2021-03-29 NOTE — PROGRESS NOTES
"    Assessment & Plan     ASSESSMENT/PLAN:      ICD-10-CM    1. Ingrown toenail  L60.0 Orthopedic & Spine  Referral   2. Paronychia of toe, right  L03.031      We discussed treatment of paronychia (likely on small nail), ingrown toenail. Does not appear secondarily infected. Follow up with podiatry if needed.    Patient Instructions   Podiatry referral for removal if needed  Warm soaks, can use the antibiotic ointment after if irritated skin  Send me a picture/message if signs of infection - increased redness/swelling, pus/discharge, pain, spreading redness/streaking    Return in about 1 week (around 4/5/2021) for if not improving or if worsening, with specialist.    MELA Bautista [facility] PATRICIA Jansen is a 50 year old who presents for the following health issues     HPI     Chief Complaint   Patient presents with     Toe Pain     Left foot large toe - pain, redness     Sometimes on right side - 2nd toe  She had toenail fungus, these were the worst ones - resolved after Dec 19  Cutting the nails triggers it, then when she wears shoes          Review of Systems   Other than noted above, general, HEENT, respiratory, cardiac, MS, and gastrointestinal systems are negative.       Objective    /70 (BP Location: Right arm, Patient Position: Sitting, Cuff Size: Adult Large)   Pulse 90   Temp 98.1  F (36.7  C) (Tympanic)   Resp 16   Ht 1.613 m (5' 3.5\")   Wt 102.5 kg (226 lb)   LMP 03/18/2021 (Approximate)   SpO2 95%   Breastfeeding No   BMI 39.41 kg/m    Body mass index is 39.41 kg/m .  Physical Exam   GENERAL: healthy, alert and no distress  SKIN: POSITIVE   Left great toe tender/mild erythema with mild ingrowing nail lateral side. No purulence, no spreading erythema  Right 2nd toe shows tenderness/mild erythema/swelling cuticle medial side. No purulence, no spreading erythema.          "

## 2021-03-29 NOTE — PATIENT INSTRUCTIONS
Podiatry referral for removal if needed  Warm soaks, can use the antibiotic ointment after if irritated skin  Send me a picture/message if signs of infection - increased redness/swelling, pus/discharge, pain, spreading redness/streaking      Patient Education     Ingrown Toenail, Not Infected (Home Treatment)  An ingrown toenail occurs when the nail grows sideways into the skin next to the nail. This can cause pain, especially when wearing tight shoes. It can also lead to an infection with redness, swelling, and pus drainage. Most people respond to the treatments described here. But sometimes surgery is needed. The big toe is most often affected.    The most common cause of an ingrown toenail is trimming your nails wrong. Most people trim the nails too close to the skin and try to round the nail too tightly around the shape of the toe. When you do this, the nail can grow into the skin of your toe. It is safer to trim the nail ending in a straight line rather than a curve.   Home care  These guidelines will help you care for your toenail at home:     Soak the painful toe in warm water 3 to 4 times each day, for 10 to 20 minutes each time. Adding Epsom salt may be advised by your healthcare provider. Wash the entire foot with an antibacterial soap. Then keep it dry.    If there is redness or swelling around the toenail, apply an antibiotic ointment 3 times a day.    Put a small piece of rolled-up cotton under the corner of the nail. This helps the nail to grow outward, away from the cuticle.    Wear shoes that don t put pressure on the toes, such as a sandal or open shoe. Closed shoes should be big enough in the toes so that there is no pressure on the painful toe.    You may use acetaminophen or ibuprofen for pain, unless another pain medicine was prescribed. Talk with your healthcare provider before using these medicines if you have chronic liver or kidney disease or if you have ever had a stomach ulcer or digestive  bleeding.  Prevention  The following tips will help you prevent ingrown toenails:    Don't wear pointed, tight, or narrow shoes.    Trim toenails once a month so they don t grow too long. Cut the nail straight across.  Follow-up care  Follow up with your healthcare provider, or as advised.  When to seek medical advice  Call your healthcare provider right away if any of these occur:    Increasing redness, pain, or swelling of the toe    Tender red streaks in the skin leading toward the ankle    Pus or fluid drainage from the toe    Fever of 100.4 F (38 C) or higher, or as directed by your provider    The area does not heal with home treatment  [employer] last reviewed this educational content on 8/1/2019 2000-2020 [employer]. All rights reserved. This information is not intended as a substitute for professional medical care. Always follow your healthcare professional's instructions.           Patient Education     Paronychia of the Finger or Toe  Paronychia is an infection near a fingernail or toenail. It usually occurs when an opening in the cuticle or an ingrown toenail lets bacteria under the skin.   The infection will need to be drained if pus is present. If the infection has been caught early, you may need only antibiotic treatment. Healing will take about 1 to 2 weeks.   Home care  Follow these guidelines when caring for yourself at home:     Clean and soak the toe or finger. Do this 2 times a day for the first 3 days. To do so:  ? Soak your foot or hand in a tub of warm water for 5 minutes. Or hold your toe or finger under a faucet of warm running water for 5 minutes.  ? Clean any crust away with soap and water using a cotton swab.  ? Put antibiotic ointment on the infected area.    Change the dressing daily or any time it gets dirty.    If you were given antibiotics, take them as directed until they are all gone.    If your infection is on a toe, wear comfortable shoes with a lot of toe room.  You can also wear open-toed sandals while your toe heals.    You may use over-the-counter medicine (acetaminophen or ibuprofen) to help with pain, unless another medicine was prescribed. If you have chronic liver or kidney disease, talk with your healthcare provider before using these medicines. Also talk with your provider if you've had a stomach ulcer or gastrointestinal bleeding.  Prevention  The following can prevent paronychia:     Don't cut or play with your cuticles at home. A healthy cuticle maintains a seal between your skin and nail and keeps out infection.    Don't bite your nails.    Don't suck on your thumbs or fingers.  Follow-up care  Follow up with your healthcare provider, or as advised.   When to seek medical advice  Call your healthcare provider right away if any of these occur:     Redness, pain, or swelling of the finger or toe gets worse    You have trouble moving or bending the finger or toe    Red streaks in the skin leading away from the wound    Pus or fluid draining from the nail area    Fever of 100.4 F (38 C) or higher, or as directed by your provider  Jonnie last reviewed this educational content on 7/1/2019 2000-2020 [employer]. All rights reserved. This information is not intended as a substitute for professional medical care. Always follow your healthcare professional's instructions.

## 2021-04-03 ENCOUNTER — HEALTH MAINTENANCE LETTER (OUTPATIENT)
Age: 51
End: 2021-04-03

## 2021-04-15 ENCOUNTER — IMMUNIZATION (OUTPATIENT)
Dept: NURSING | Facility: CLINIC | Age: 51
End: 2021-04-15
Attending: INTERNAL MEDICINE
Payer: COMMERCIAL

## 2021-04-15 PROCEDURE — 0002A PR COVID VAC PFIZER DIL RECON 30 MCG/0.3 ML IM: CPT

## 2021-04-15 PROCEDURE — 91300 PR COVID VAC PFIZER DIL RECON 30 MCG/0.3 ML IM: CPT

## 2021-07-23 ENCOUNTER — E-VISIT (OUTPATIENT)
Dept: URGENT CARE | Facility: URGENT CARE | Age: 51
End: 2021-07-23
Payer: COMMERCIAL

## 2021-07-23 DIAGNOSIS — Z20.822 SUSPECTED COVID-19 VIRUS INFECTION: Primary | ICD-10-CM

## 2021-07-23 PROCEDURE — 99421 OL DIG E/M SVC 5-10 MIN: CPT | Performed by: PHYSICIAN ASSISTANT

## 2021-07-23 NOTE — PATIENT INSTRUCTIONS
Dear Justyna Zhang,    Your symptoms show that you may have coronavirus (COVID-19). This illness can cause fever, cough and trouble breathing. Many people get a mild case and get better on their own. Some people can get very sick.    Will I be tested for COVID-19?  We would like to test you for Covid-19 virus. I have placed orders for this test.     To schedule: go to your [employer] home page and scroll down to the section that says  You have an appointment that needs to be scheduled  and click the large green button that says  Schedule Now  and follow the steps to find the next available openings.    If you are unable to complete these [employer] scheduling steps, please call [phone] to schedule your testing.     Return to work/school/ guidance:  Please let your workplace manager and staffing office know when your quarantine ends     We can t give you an exact date as it depends on the above. You can calculate this on your own or work with your manager/staffing office to calculate this. (For example if you were exposed on 10/4, you would have to quarantine for 14 full days. That would be through 10/18. You could return on 10/19.)      If you receive a positive COVID-19 test result, follow the guidance of the those who are giving you the results. Usually the return to work is 10 (or in some cases 20 days from symptom onset.) If you work at [facility], you must also be cleared by Employee Occupational Health and Safety to return to work.        If you receive a negative COVID-19 test result and did not have a high risk exposure to someone with a known positive COVID-19 test, you can return to work once you're free of fever for 24 hours without fever-reducing medication and your symptoms are improving or resolved.      If you receive a negative COVID-19 test and If you had a high risk exposure to someone who has tested positive for COVID-19 then you can return to work 14 days after your last  contact with the positive individual    Note: If you have ongoing exposure to the covid positive person, this quarantine period may be more than 14 days. (For example, if you are continued to be exposed to your child who tested positive and cannot isolate from them, then the quarantine of 7-14 days can't start until your child is no longer contagious. This is typically 10 days from onset of the child's symptoms. So the total duration may be 17-24 days in this case.)    Sign up for [employer].   We know it's scary to hear that you might have COVID-19. We want to track your symptoms to make sure you're okay over the next 2 weeks. Please look for an email from [employer]--this is a free, online program that we'll use to keep in touch. To sign up, follow the link in the email you will receive. Learn more at [email or web address]    How can I take care of myself?    Get lots of rest. Drink extra fluids (unless a doctor has told you not to)    Take Tylenol (acetaminophen) or ibuprofen for fever or pain. If you have liver or kidney problems, ask your family doctor if it's okay to take Tylenol o ibuprofen    If you have other health problems (like cancer, heart failure, an organ transplant or severe kidney disease): Call your specialty clinic if you don't feel better in the next 2 days.    Know when to call 911. Emergency warning signs include:  o Trouble breathing or shortness of breath  o Pain or pressure in the chest that doesn't go away  o Feeling confused like you haven't felt before, or not being able to wake up  o Bluish-colored lips or face    Where can I get more information?  [facility] - About COVID-19:   [email or web address]    CDC - What to Do If You're Sick:   www.cdc.gov/coronavirus/2019-ncov/about/steps-when-sick.html

## 2021-09-18 ENCOUNTER — HEALTH MAINTENANCE LETTER (OUTPATIENT)
Age: 51
End: 2021-09-18

## 2021-10-01 ENCOUNTER — E-VISIT (OUTPATIENT)
Dept: URGENT CARE | Facility: CLINIC | Age: 51
End: 2021-10-01
Payer: COMMERCIAL

## 2021-10-01 DIAGNOSIS — J06.9 VIRAL URI: ICD-10-CM

## 2021-10-01 DIAGNOSIS — J06.9 VIRAL UPPER RESPIRATORY INFECTION: Primary | ICD-10-CM

## 2021-10-01 PROCEDURE — 99421 OL DIG E/M SVC 5-10 MIN: CPT | Performed by: NURSE PRACTITIONER

## 2021-10-01 NOTE — PATIENT INSTRUCTIONS
Dear Justyna,    It sounds like you have a viral upper respiratory infection.  Sinus infections take over 10 days to develop so I do not believe it is a sinus infection you are struggling with.  I would recommend you also be tested for COVID-19 and have entered an order for that to be done.  You will either receive a message via "[employer] saying you have a lab test to schedule and can follow that link or you may call [phone] to schedule your test.  It would suggest you isolate at home for at least 10 days from the start of your symptoms to prevent the spread of infection.    Use Netti pot twice a day for the next 3-5 days. Use distilled water and the packets of powder included with the pot.    Take Sudafed (Pseudoephredine) 30mg every 6 hours while awake for the next 3-5 days. Do not take this if you have a history of high blood pressure or take medications for high blood pressure.    Take Tylenol or Ibuprofen as needed for fever or pain.    Drink Plenty of water and rest.    Take care,    Deana Clements CNP    You may want to try a nasal lavage (also known as nasal irrigation). You can find over-the-counter products, such as Neti-Pot, at retail locations or make your own at home. Instructions for homemade nasal lavage and more information on the process are available online at [email or web address].

## 2022-01-08 ENCOUNTER — HEALTH MAINTENANCE LETTER (OUTPATIENT)
Age: 52
End: 2022-01-08

## 2022-04-30 ENCOUNTER — HEALTH MAINTENANCE LETTER (OUTPATIENT)
Age: 52
End: 2022-04-30

## 2022-06-02 ASSESSMENT — ENCOUNTER SYMPTOMS
EYE PAIN: 0
MYALGIAS: 0
PARESTHESIAS: 0
BREAST MASS: 0
CHILLS: 0
DIZZINESS: 0
SHORTNESS OF BREATH: 0
HEMATOCHEZIA: 0
SORE THROAT: 0
PALPITATIONS: 0
DIARRHEA: 0
COUGH: 0
FEVER: 0
HEARTBURN: 0
NERVOUS/ANXIOUS: 0
FREQUENCY: 0
JOINT SWELLING: 0
DYSURIA: 0
HEMATURIA: 0
CONSTIPATION: 0
NAUSEA: 0
ABDOMINAL PAIN: 0
WEAKNESS: 0
HEADACHES: 0
ARTHRALGIAS: 0

## 2022-06-04 ENCOUNTER — HOSPITAL ENCOUNTER (OUTPATIENT)
Dept: MAMMOGRAPHY | Facility: CLINIC | Age: 52
Discharge: HOME | End: 2022-06-04
Attending: PHYSICIAN ASSISTANT | Admitting: PHYSICIAN ASSISTANT
Payer: COMMERCIAL

## 2022-06-04 DIAGNOSIS — Z12.31 VISIT FOR SCREENING MAMMOGRAM: ICD-10-CM

## 2022-06-04 PROCEDURE — 77067 SCR MAMMO BI INCL CAD: CPT

## 2022-06-06 ENCOUNTER — OFFICE VISIT (OUTPATIENT)
Dept: FAMILY MEDICINE | Facility: CLINIC | Age: 52
End: 2022-06-06
Payer: COMMERCIAL

## 2022-06-06 VITALS
HEIGHT: 64 IN | BODY MASS INDEX: 38.91 KG/M2 | SYSTOLIC BLOOD PRESSURE: 112 MMHG | RESPIRATION RATE: 16 BRPM | TEMPERATURE: 97.8 F | HEART RATE: 80 BPM | DIASTOLIC BLOOD PRESSURE: 76 MMHG | OXYGEN SATURATION: 97 % | WEIGHT: 227.9 LBS

## 2022-06-06 DIAGNOSIS — G56.23 ULNAR NEUROPATHY OF BOTH UPPER EXTREMITIES: ICD-10-CM

## 2022-06-06 DIAGNOSIS — M25.50 MULTIPLE JOINT PAIN: ICD-10-CM

## 2022-06-06 DIAGNOSIS — M54.2 NECK PAIN: ICD-10-CM

## 2022-06-06 DIAGNOSIS — M79.7 FIBROMYALGIA: ICD-10-CM

## 2022-06-06 DIAGNOSIS — M79.89 LEG SWELLING: ICD-10-CM

## 2022-06-06 DIAGNOSIS — N92.6 IRREGULAR MENSES: ICD-10-CM

## 2022-06-06 DIAGNOSIS — Z13.220 SCREENING FOR LIPOID DISORDERS: ICD-10-CM

## 2022-06-06 DIAGNOSIS — Z12.11 SCREEN FOR COLON CANCER: ICD-10-CM

## 2022-06-06 DIAGNOSIS — Z00.00 ROUTINE GENERAL MEDICAL EXAMINATION AT A HEALTH CARE FACILITY: Primary | ICD-10-CM

## 2022-06-06 LAB
ANION GAP SERPL CALCULATED.3IONS-SCNC: 5 MMOL/L (ref 3–14)
B BURGDOR IGG+IGM SER QL: 0.06
BASOPHILS # BLD AUTO: 0 10E3/UL (ref 0–0.2)
BASOPHILS NFR BLD AUTO: 1 %
BUN SERPL-MCNC: 17 MG/DL (ref 7–30)
CALCIUM SERPL-MCNC: 8.8 MG/DL (ref 8.5–10.1)
CHLORIDE BLD-SCNC: 107 MMOL/L (ref 94–109)
CHOLEST SERPL-MCNC: 169 MG/DL
CO2 SERPL-SCNC: 26 MMOL/L (ref 20–32)
CREAT SERPL-MCNC: 0.74 MG/DL (ref 0.52–1.04)
CRP SERPL-MCNC: 9.9 MG/L (ref 0–8)
EOSINOPHIL # BLD AUTO: 0.2 10E3/UL (ref 0–0.7)
EOSINOPHIL NFR BLD AUTO: 3 %
ERYTHROCYTE [DISTWIDTH] IN BLOOD BY AUTOMATED COUNT: 13.3 % (ref 10–15)
FASTING STATUS PATIENT QL REPORTED: YES
GFR SERPL CREATININE-BSD FRML MDRD: >90 ML/MIN/1.73M2
GLUCOSE BLD-MCNC: 97 MG/DL (ref 70–99)
HCT VFR BLD AUTO: 44.1 % (ref 35–47)
HDLC SERPL-MCNC: 48 MG/DL
HGB BLD-MCNC: 14.6 G/DL (ref 11.7–15.7)
LDLC SERPL CALC-MCNC: 82 MG/DL
LYMPHOCYTES # BLD AUTO: 1.8 10E3/UL (ref 0.8–5.3)
LYMPHOCYTES NFR BLD AUTO: 32 %
MCH RBC QN AUTO: 28.6 PG (ref 26.5–33)
MCHC RBC AUTO-ENTMCNC: 33.1 G/DL (ref 31.5–36.5)
MCV RBC AUTO: 86 FL (ref 78–100)
MONOCYTES # BLD AUTO: 0.6 10E3/UL (ref 0–1.3)
MONOCYTES NFR BLD AUTO: 10 %
NEUTROPHILS # BLD AUTO: 3 10E3/UL (ref 1.6–8.3)
NEUTROPHILS NFR BLD AUTO: 54 %
NONHDLC SERPL-MCNC: 121 MG/DL
PLATELET # BLD AUTO: 205 10E3/UL (ref 150–450)
POTASSIUM BLD-SCNC: 3.9 MMOL/L (ref 3.4–5.3)
RBC # BLD AUTO: 5.11 10E6/UL (ref 3.8–5.2)
SODIUM SERPL-SCNC: 138 MMOL/L (ref 133–144)
TRIGL SERPL-MCNC: 196 MG/DL
TSH SERPL DL<=0.005 MIU/L-ACNC: 2.97 MU/L (ref 0.4–4)
WBC # BLD AUTO: 5.6 10E3/UL (ref 4–11)

## 2022-06-06 PROCEDURE — 86038 ANTINUCLEAR ANTIBODIES: CPT | Performed by: PHYSICIAN ASSISTANT

## 2022-06-06 PROCEDURE — 86431 RHEUMATOID FACTOR QUANT: CPT | Performed by: PHYSICIAN ASSISTANT

## 2022-06-06 PROCEDURE — 99396 PREV VISIT EST AGE 40-64: CPT | Performed by: PHYSICIAN ASSISTANT

## 2022-06-06 PROCEDURE — 86140 C-REACTIVE PROTEIN: CPT | Performed by: PHYSICIAN ASSISTANT

## 2022-06-06 PROCEDURE — 80061 LIPID PANEL: CPT | Performed by: PHYSICIAN ASSISTANT

## 2022-06-06 PROCEDURE — 86200 CCP ANTIBODY: CPT | Performed by: PHYSICIAN ASSISTANT

## 2022-06-06 PROCEDURE — 80048 BASIC METABOLIC PNL TOTAL CA: CPT | Performed by: PHYSICIAN ASSISTANT

## 2022-06-06 PROCEDURE — 86618 LYME DISEASE ANTIBODY: CPT | Performed by: PHYSICIAN ASSISTANT

## 2022-06-06 PROCEDURE — 84443 ASSAY THYROID STIM HORMONE: CPT | Performed by: PHYSICIAN ASSISTANT

## 2022-06-06 PROCEDURE — 85025 COMPLETE CBC W/AUTO DIFF WBC: CPT | Performed by: PHYSICIAN ASSISTANT

## 2022-06-06 PROCEDURE — 99213 OFFICE O/P EST LOW 20 MIN: CPT | Mod: 25 | Performed by: PHYSICIAN ASSISTANT

## 2022-06-06 PROCEDURE — 36415 COLL VENOUS BLD VENIPUNCTURE: CPT | Performed by: PHYSICIAN ASSISTANT

## 2022-06-06 RX ORDER — FUROSEMIDE 20 MG
20 TABLET ORAL DAILY PRN
Qty: 30 TABLET | Refills: 0 | Status: SHIPPED | OUTPATIENT
Start: 2022-06-06 | End: 2023-07-07

## 2022-06-06 ASSESSMENT — ENCOUNTER SYMPTOMS
FEVER: 0
PARESTHESIAS: 0
HEARTBURN: 0
SORE THROAT: 0
DYSURIA: 0
NERVOUS/ANXIOUS: 0
JOINT SWELLING: 0
EYE PAIN: 0
DIARRHEA: 0
DIZZINESS: 0
HEMATOCHEZIA: 0
SHORTNESS OF BREATH: 0
ABDOMINAL PAIN: 0
CHILLS: 0
FREQUENCY: 0
HEADACHES: 0
COUGH: 0
ARTHRALGIAS: 0
HEMATURIA: 0
NAUSEA: 0
WEAKNESS: 0
PALPITATIONS: 0
BREAST MASS: 0
MYALGIAS: 0
CONSTIPATION: 0

## 2022-06-06 NOTE — PATIENT INSTRUCTIONS
If you are interested in the shingles shot - check with insurance to see if it is covered to have done at the pharmacy or in clinic    For fibromyalgia/pain:  Use the tizanidine as needed. Can make you groggy.  We will check other labs  Try the wrist brace for the next couple weeks      Preventive Health Recommendations  Female Ages 50 - 64    Yearly exam: See your health care provider every year in order to  Review health changes.   Discuss preventive care.    Review your medicines if your doctor has prescribed any.    Get a Pap test every three years (unless you have an abnormal result and your provider advises testing more often).  If you get Pap tests with HPV test, you only need to test every 5 years, unless you have an abnormal result.   You do not need a Pap test if your uterus was removed (hysterectomy) and you have not had cancer.  You should be tested each year for STDs (sexually transmitted diseases) if you're at risk.   Have a mammogram every 1 to 2 years.  Have a colonoscopy at age 50, or have a yearly FIT test (stool test). These exams screen for colon cancer.    Have a cholesterol test every 5 years, or more often if advised.  Have a diabetes test (fasting glucose) every three years. If you are at risk for diabetes, you should have this test more often.   If you are at risk for osteoporosis (brittle bone disease), think about having a bone density scan (DEXA).    Shots: Get a flu shot each year. Get a tetanus shot every 10 years.    Nutrition:   Eat at least 5 servings of fruits and vegetables each day.  Eat whole-grain bread, whole-wheat pasta and brown rice instead of white grains and rice.  Get adequate Calcium and Vitamin D.     Lifestyle  Exercise at least 150 minutes a week (30 minutes a day, 5 days a week). This will help you control your weight and prevent disease.  Limit alcohol to one drink per day.  No smoking.   Wear sunscreen to prevent skin cancer.   See your dentist every six months for  an exam and cleaning.  See your eye doctor every 1 to 2 years.

## 2022-06-06 NOTE — PROGRESS NOTES
SUBJECTIVE:   CC: Justyna Zhang is an 52 year old woman who presents for preventive health visit.     Patient has been advised of split billing requirements and indicates understanding: Yes  Healthy Habits:     Getting at least 3 servings of Calcium per day:  Yes    Bi-annual eye exam:  Yes    Dental care twice a year:  Yes    Sleep apnea or symptoms of sleep apnea:  None    Diet:  Regular (no restrictions)    Frequency of exercise:  1 day/week    Duration of exercise:  Less than 15 minutes    Taking medications regularly:  Yes    Medication side effects:  None    PHQ-2 Total Score: 0    Additional concerns today:  No    Fibromyalgia in general getting worse. She had flares that worsened with each COVID-19 shot, does not want 4th booster.     With a flare: She gets flushing/hot flashes, worse aches/pains, low energy, not feeling well. The last few years increased elbow, hand joint pain, knees a little sore.shoulders, neck. Numbness/tingling hands/arms (R>L, ulnar neuropathy but then whole hand will go numb). Needs to shake it out at night, wakes her up. Low  strength, can't hold book to read even at baseline.    Adopted, unknown family history.  Started optivia diet   Fibro is better with exercise, would like to exercise more  Periods irregular the last couple years. Hot flashes. Will have months of no period. Just had recent menses in May.  Has seen chiro, tens. Not interested in medications    When she travels she has puffy feet, has used lasix PRN. She uses 2-3 days  Going out east in August - [place], [place]    Today's PHQ-2 Score:   PHQ-2 ( 1999 Pfizer) 6/2/2022   Q1: Little interest or pleasure in doing things 0   Q2: Feeling down, depressed or hopeless 0   PHQ-2 Score 0   PHQ-2 Total Score (12-17 Years)- Positive if 3 or more points; Administer PHQ-A if positive -   Q1: Little interest or pleasure in doing things Not at all   Q2: Feeling down, depressed or hopeless Not at all   PHQ-2 Score 0       Abuse:  Current or Past (Physical, Sexual or Emotional) - No  Do you feel safe in your environment? Yes    Have you ever done Advance Care Planning? (For example, a Health Directive, POLST, or a discussion with a medical provider or your loved ones about your wishes): No, advance care planning information given to patient to review.  Patient plans to discuss their wishes with loved ones or provider.      Social History     Tobacco Use     Smoking status: Never Smoker     Smokeless tobacco: Never Used   Substance Use Topics     Alcohol use: Yes     Comment: monthly     If you drink alcohol do you typically have >3 drinks per day or >7 drinks per week? No    Alcohol Use 6/2/2022   Prescreen: >3 drinks/day or >7 drinks/week? No   Prescreen: >3 drinks/day or >7 drinks/week? -   No flowsheet data found.    Reviewed orders with patient.  Reviewed health maintenance and updated orders accordingly - Yes  Lab work is in process  Labs reviewed in EPIC  BP Readings from Last 3 Encounters:   06/06/22 112/76   03/29/21 104/70   12/04/20 133/89    Wt Readings from Last 3 Encounters:   06/06/22 103.4 kg (227 lb 14.4 oz)   03/29/21 102.5 kg (226 lb)   12/10/20 99.8 kg (220 lb)                  Patient Active Problem List   Diagnosis     Chronic rhinitis     Tinea pedis     Fibromyalgia     Environmental allergies     History reviewed. No pertinent surgical history.    Social History     Tobacco Use     Smoking status: Never Smoker     Smokeless tobacco: Never Used   Substance Use Topics     Alcohol use: Yes     Comment: monthly     Family History   Problem Relation Age of Onset     Unknown/Adopted Other            Breast Cancer Screening:    FHS-7:   Breast CA Risk Assessment (FHS-7) 6/4/2022   Did any of your first-degree relatives have breast or ovarian cancer? No   Did any of your relatives have bilateral breast cancer? No   Did any man in your family have breast cancer? Unknown   Did any woman in your family have breast and ovarian  cancer? No   Did any woman in your family have breast cancer before age 50 y? No   Do you have 2 or more relatives with breast and/or ovarian cancer? No   Do you have 2 or more relatives with breast and/or bowel cancer? No     Mammogram Screening: Recommended annual mammography  Pertinent mammograms are reviewed under the imaging tab.    History of abnormal Pap smear: NO - age 30-65 PAP every 5 years with negative HPV co-testing recommended  PAP / HPV Latest Ref Rng & Units 12/4/2020 4/5/2017 8/7/2014   PAP (Historical) - NIL NIL NIL   HPV16 NEG:Negative Negative Negative -   HPV18 NEG:Negative Negative Negative -   HRHPV NEG:Negative Negative Negative -     Reviewed and updated as needed this visit by clinical staff                    Reviewed and updated as needed this visit by Provider                   History reviewed. No pertinent past medical history.   History reviewed. No pertinent surgical history.    Review of Systems   Constitutional: Negative for chills and fever.   HENT: Negative for congestion, ear pain, hearing loss and sore throat.    Eyes: Negative for pain and visual disturbance.   Respiratory: Negative for cough and shortness of breath.    Cardiovascular: Negative for chest pain, palpitations and peripheral edema.   Gastrointestinal: Negative for abdominal pain, constipation, diarrhea, heartburn, hematochezia and nausea.   Breasts:  Negative for tenderness, breast mass and discharge.   Genitourinary: Negative for dysuria, frequency, genital sores, hematuria, pelvic pain, urgency, vaginal bleeding and vaginal discharge.   Musculoskeletal: Negative for arthralgias, joint swelling and myalgias.   Skin: Negative for rash.   Neurological: Negative for dizziness, weakness, headaches and paresthesias.   Psychiatric/Behavioral: Negative for mood changes. The patient is not nervous/anxious.         OBJECTIVE:   There were no vitals taken for this visit.  Physical Exam  GENERAL: healthy, alert and no  distress  EYES: Eyes grossly normal to inspection, PERRL and conjunctivae and sclerae normal  HENT: ear canals and TM's normal, nose and mouth without ulcers or lesions  NECK: no adenopathy, no asymmetry, masses, or scars and thyroid normal to palpation  RESP: lungs clear to auscultation - no rales, rhonchi or wheezes  CV: regular rate and rhythm, normal S1 S2, no S3 or S4, no murmur, click or rub, no peripheral edema and peripheral pulses strong  ABDOMEN: soft, nontender, no hepatosplenomegaly, no masses and bowel sounds normal  MS: no gross musculoskeletal defects noted, no edema  POSITIVE   Neck: tender to palpate/tight muscles throughout trap/paracervical  Elbow; full ROM, positive Tinel sign at ulnar nerve  Wrist: full ROM, negative tinel sign/phalen sign    SKIN: no suspicious lesions or rashes  NEURO: Normal strength and tone, mentation intact and speech normal  BACK: no CVA tenderness, no paralumbar tenderness  PSYCH: mentation appears normal, affect normal/bright  LYMPH: no cervical, supraclavicular, axillary, or inguinal adenopathy    Diagnostic Test Results:  Labs reviewed in Epic    ASSESSMENT/PLAN:     ASSESSMENT/PLAN:      ICD-10-CM    1. Routine general medical examination at a health care facility  Z00.00    2. Screen for colon cancer  Z12.11 Adult Gastro Ref - Procedure Only   3. Fibromyalgia  M79.7 tiZANidine (ZANAFLEX) 4 MG tablet     Physical Therapy Referral     Orthopedic  Referral   4. Ulnar neuropathy of both upper extremities  G56.23 Orthopedic  Referral   5. Screening for lipoid disorders  Z13.220 Lipid panel reflex to direct LDL Fasting     Lipid panel reflex to direct LDL Fasting   6. Leg swelling  M79.89 furosemide (LASIX) 20 MG tablet     Basic metabolic panel  (Ca, Cl, CO2, Creat, Gluc, K, Na, BUN)     Basic metabolic panel  (Ca, Cl, CO2, Creat, Gluc, K, Na, BUN)   7. Multiple joint pain  M25.50 Rheumatoid factor     Anti Nuclear Sandy IgG by IFA with Reflex     Lyme  "Disease Total Abs Bld with Reflex to Confirm CLIA     Cyclic Citrullinated Peptide Antibody IgG     CRP, inflammation     CBC with platelets and differential     Physical Therapy Referral     Rheumatoid factor     Anti Nuclear Sandy IgG by IFA with Reflex     Lyme Disease Total Abs Bld with Reflex to Confirm CLIA     Cyclic Citrullinated Peptide Antibody IgG     CRP, inflammation     CBC with platelets and differential     Orthopedic  Referral   8. Irregular menses  N92.6 TSH with free T4 reflex     TSH with free T4 reflex   9. Neck pain  M54.2 Orthopedic  Referral       Patient Instructions   If you are interested in the shingles shot - check with insurance to see if it is covered to have done at the pharmacy or in clinic    For fibromyalgia/pain:  Use the tizanidine as needed. Can make you groggy.  We will check other labs  Try the wrist brace for the next couple weeks  PM&R referral placed for further evaluation  physical therapy referral placed    COUNSELING:  Reviewed preventive health counseling, as reflected in patient instructions       Regular exercise       Healthy diet/nutrition       Immunizations    Declines COVID-19 vaccine, discussed zoster       The 10-year ASCVD risk score (Northridgeuzair VAUGHN Jr., et al., 2013) is: 0.9%    Values used to calculate the score:      Age: 52 years      Sex: Female      Is Non- : No      Diabetic: No      Tobacco smoker: No      Systolic Blood Pressure: 112 mmHg      Is BP treated: No      HDL Cholesterol: 48 mg/dL      Total Cholesterol: 169 mg/dL    Estimated body mass index is 39.41 kg/m  as calculated from the following:    Height as of 3/29/21: 1.613 m (5' 3.5\").    Weight as of 3/29/21: 102.5 kg (226 lb).    Weight management plan: Discussed healthy diet and exercise guidelines    She reports that she has never smoked. She has never used smokeless tobacco.      Counseling Resources:  ATP IV Guidelines  Pooled Cohorts Equation " Calculator  Breast Cancer Risk Calculator  BRCA-Related Cancer Risk Assessment: FHS-7 Tool  FRAX Risk Assessment  ICSI Preventive Guidelines  Dietary Guidelines for Americans, 2010  USDA's MyPlate  ASA Prophylaxis  Lung CA Screening    MELA Bautista [facility]

## 2022-06-07 LAB
ANA SER QL IF: NEGATIVE
CCP AB SER IA-ACNC: 1.9 U/ML
RHEUMATOID FACT SER NEPH-ACNC: <6 IU/ML

## 2022-06-08 ENCOUNTER — MYC MEDICAL ADVICE (OUTPATIENT)
Dept: FAMILY MEDICINE | Facility: CLINIC | Age: 52
End: 2022-06-08
Payer: COMMERCIAL

## 2022-06-22 NOTE — TELEPHONE ENCOUNTER
SPINE PATIENTS - NEW PROTOCOL PREVISIT    RECORDS RECEIVED FROM: Internal   REASON FOR VISIT: Ulnar neuropathy of both UEs/Fibromaylgia   Date of Appt: 06/29/2022   NOTES (FOR ALL VISITS) STATUS DETAILS   OFFICE NOTE from referring provider Internal 06/06/2022 Dr David [facility]    OFFICE NOTE from other specialist N/A    DISCHARGE SUMMARY from hospital N/A    DISCHARGE REPORT from ER N/A    EMG REPORT N/A    MEDICATION LIST N/A    IMAGING  (FOR ALL VISITS)     MRI (HEAD, NECK, SPINE) N/A    XRAY (SPINE) *NEUROSURGERY* N/A    CT (HEAD, NECK, SPINE) N/A

## 2022-06-28 ENCOUNTER — TELEPHONE (OUTPATIENT)
Dept: NEUROSURGERY | Facility: CLINIC | Age: 52
End: 2022-06-28

## 2022-06-28 NOTE — TELEPHONE ENCOUNTER
Pt returned call and stated that she prefers to see PMR and wants her appt with Dr. Jay cancelled.  I did let her know that she will be getting a call from Scheduling to reschedule with PMR.    Kanika Disla LPN

## 2022-06-28 NOTE — TELEPHONE ENCOUNTER
I called patient and LM that pt was scheduled to see Dr. Jay for neck pain only.  Dr. Jay is a spine doctor not a PMR Physician.  Pt was asked to call back to  [phone] with questions.    Kanika Disla LPN

## 2022-06-29 ENCOUNTER — PRE VISIT (OUTPATIENT)
Dept: NEUROSURGERY | Facility: CLINIC | Age: 52
End: 2022-06-29

## 2022-07-13 ENCOUNTER — HOSPITAL ENCOUNTER (OUTPATIENT)
Dept: PHYSICAL THERAPY | Facility: CLINIC | Age: 52
Setting detail: THERAPIES SERIES
Discharge: HOME | End: 2022-07-13
Attending: PHYSICIAN ASSISTANT
Payer: COMMERCIAL

## 2022-07-13 DIAGNOSIS — M25.50 MULTIPLE JOINT PAIN: ICD-10-CM

## 2022-07-13 DIAGNOSIS — M79.7 FIBROMYALGIA: ICD-10-CM

## 2022-07-13 PROCEDURE — 97161 PT EVAL LOW COMPLEX 20 MIN: CPT | Mod: GP

## 2022-07-13 PROCEDURE — 97110 THERAPEUTIC EXERCISES: CPT | Mod: GP

## 2022-07-13 NOTE — PROGRESS NOTES
07/13/22 0700   Quick Adds   Type of Visit Initial OP PT Evaluation   General Information   Start of Care Date 07/13/22   Referring Physician Judy David PA-C   Orders Evaluate and Treat as Indicated   Order Date 06/06/22   Medical Diagnosis Fibromyalgia, multiple joint pain, ulnar neuropathy of both upper extremities, neck pain   Onset of illness/injury or Date of Surgery 06/06/22  (order date)   Surgical/Medical history reviewed Yes   Pertinent history of current problem (include personal factors and/or comorbidities that impact the POC) Fibromyalgia in general getting worse. She had flares that worsened with each COVID-19 shot, does not want 4th booster. With a flare: She gets flushing/hot flashes, worse aches/pains, low energy, not feeling well.   Patient role/Employment history Employed   Living environment House/townhome   Home/Community Accessibility Comments Lives in a rambler, no stairs. Stairs are not difficult for her.   Patient/Family Goals Statement Improve  strength, improve activity tolerance and ability to go on walks without pain the next day.   General Information Comments Has had fibromyalgia for a very long time (since 20 yo), functions pretty well with it. For last 5 years progressively, hands are getting weaker, can't hold paper back book. Hands are stiff and sore. Hands are worst but feels it in whole arm and up to shoulder. Tried chiropractor past 3 weeks and felt it didn't help. Has not tried PT in the past. Pt types all day and by the end of the day she misses words a lot because her hands are stiff. 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 R side more affected than L side. Wakes up sore in the morning. Uses heat and Aleve for pain relief. Also uses muscle relaxants which help but make her drowsy so only takes them at night.   Fall Risk Screen   Fall screen completed by PT   Have you fallen 2 or more times in the past year? No   Have you  fallen and had an injury in the past year? No   Is patient a fall risk? No   Abuse Screen (yes response referral indicated)   Feels Unsafe at Home or Work/School no   Feels Threatened by Someone no   Does Anyone Try to Keep You From Having Contact with Others or Doing Things Outside Your Home? no   Physical Signs of Abuse Present no   Pain   Patient currently in pain Yes   Pain location Primarily B hands, wrists, up into forearm, and occasionally in shoulder/neck.   Pain rating 5-6/10 is normal baseline, 8-9/10 at worst, 4-5/10 at best.   Pain description comment Sore/stiff   Pain comments If she's overly active, then pain is much worse. Long walks or full days being active make it worse, hiking aggravates it (family likes to go hiking). Cooking with repetitive stirring or gripping a utensil is difficult. Ulnar neuropathy in R hand worse than L, pinky and ring finger are numb, pain in lateral epicondyle. Occasional numbness in shoulder area but more rare.   Range of Motion (ROM)   ROM Comment All ROM WNL   Strength   Strength Comments LE gross screen WNL, UE gross screen 5/5 with exception of L shoulder flexion and abduction 4+/5.  strength average of 41 lbs on R UE, 38.6 lbs on L UE- both poor and below average.   Musculoskeletal Special Tests   Musculoskeletal Special Tests Spurlings compression and distraction- negative B. Ulnar nerve testing in supine positive B; high irritability and reproduction of normal symptoms only in mid range.   Balance   Balance Comments General balance screen WNL.   Balance Special Tests   Balance Special Tests Modified CTSIB Conditions;Sit to stand reps   Balance Special Tests Modified CTSIB Conditions   Condition 1, seconds 30 Seconds   Condition 2, seconds 30 Seconds   Condition 4, seconds 30 Seconds   Condition 5, seconds 30 Seconds   Modified CTSIB Comments No difficulties.   Balance Special Tests Sit to Stand Reps in 30 Seconds   Reps in 30 seconds 9   Height 18 inches from  chair   Comments No difficulty   Planned Therapy Interventions   Planned Therapy Interventions neuromuscular re-education;strengthening   Clinical Impression   Criteria for Skilled Therapeutic Interventions Met yes, treatment indicated   PT Diagnosis Ulnar nerve irritability, muscle weakness   Influenced by the following impairments Fibromyalgia diagnosis, muscle weakness, pain   Functional limitations due to impairments Decreased ability to  objects in daily activities, decreased ability to go on long walks without pain   Clinical Presentation Stable/Uncomplicated   Clinical Presentation Rationale Symptom report, clinical judgment   Clinical Decision Making (Complexity) Low complexity   Therapy Frequency 1 time/week  (decreasing as needed)   Predicted Duration of Therapy Intervention (days/wks) 90 days   Risk & Benefits of therapy have been explained Yes   Patient, Family & other staff in agreement with plan of care Yes   Clinical Impression Comments Justyna is a 52 year old female who presents to PT for evaluation. She demonstrates a decrease in  strength bilaterally, as well as ulnar nerve irritability. She also demonstrates lower activity tolerance secondary to pain. Justyna will benefit from skilled PT services to set up home program for continued progress in increasing muscle and  strength, decreasing nerve irritability, and returning to PLOF.   GOALS   PT Eval Goals 1;2;3   Goal 1   Goal Identifier HEP   Goal Description Justyna will demonstrate independence in carryig out HEP at home for continued progress.   Target Date 10/10/22   Goal 2   Goal Identifier  strength   Goal Description Justyna will improve  strength by 10% in B UEs in order to facilitate ease of daily activities.   Target Date 10/10/22   Goal 3   Goal Identifier Nerve symptoms   Goal Description Justyna will report only mild nerve symptoms (such as numbness/tingling in hands) for 2 consecutive visits in order to improve pain and  daily function.   Target Date 10/10/22   Total Evaluation Time   PT Shwethaal, Low Complexity Minutes (77561) 30       Thank you for referring Justyna to outpatient physical therapy. Please do not hesitate to contact me with any questions via email at [email or web address].     Guido Negro, PT, DPT  [employer]   [email or web address]

## 2022-07-14 ENCOUNTER — OFFICE VISIT (OUTPATIENT)
Dept: PHYSICAL MEDICINE AND REHAB | Facility: CLINIC | Age: 52
End: 2022-07-14
Payer: COMMERCIAL

## 2022-07-14 VITALS
DIASTOLIC BLOOD PRESSURE: 75 MMHG | SYSTOLIC BLOOD PRESSURE: 122 MMHG | BODY MASS INDEX: 38.53 KG/M2 | WEIGHT: 221 LBS | HEART RATE: 86 BPM

## 2022-07-14 DIAGNOSIS — M79.18 MYOFASCIAL PAIN: ICD-10-CM

## 2022-07-14 DIAGNOSIS — M54.2 CERVICAL SPINE PAIN: Primary | ICD-10-CM

## 2022-07-14 DIAGNOSIS — R29.2 HYPER REFLEXIA: ICD-10-CM

## 2022-07-14 DIAGNOSIS — R20.2 PARESTHESIA OF HAND, BILATERAL: ICD-10-CM

## 2022-07-14 PROCEDURE — 99204 OFFICE O/P NEW MOD 45 MIN: CPT | Performed by: PHYSICAL MEDICINE & REHABILITATION

## 2022-07-14 RX ORDER — GABAPENTIN 100 MG/1
CAPSULE ORAL
Qty: 90 CAPSULE | Refills: 1 | Status: SHIPPED | OUTPATIENT
Start: 2022-07-14 | End: 2023-07-07

## 2022-07-14 ASSESSMENT — PAIN SCALES - GENERAL: PAINLEVEL: EXTREME PAIN (8)

## 2022-07-14 NOTE — PROGRESS NOTES
Assessment/Plan:      Justyna was seen today for neck pain and shoulder pain.    Diagnoses and all orders for this visit:    Cervical spine pain  -     MR Cervical Spine w/o Contrast; Future    Paresthesia of hand, bilateral  -     MR Cervical Spine w/o Contrast; Future  -     EMG; Future  -     gabapentin (NEURONTIN) 100 MG capsule; 100mg at bedtime x 3 days, then may increase to 200mg x 3 days, then may increase to 300mg at bedtime    Hyper reflexia  -     MR Cervical Spine w/o Contrast; Future    Myofascial pain  -     gabapentin (NEURONTIN) 100 MG capsule; 100mg at bedtime x 3 days, then may increase to 200mg x 3 days, then may increase to 300mg at bedtime    Other orders  -     Orthopedic  Referral         Assessment: Pleasant 52 year old female with history of fibromyalgia with:    1.  Chronic cervical spine pain parascapular pain with bilateral hand and arm paresthesias.  She has hyperreflexia as well.  This could represent myofascial pain in the setting of fibromyalgia with superimposed mononeuropathies in the upper EXTR such as carpal tunnel syndrome versus ulnar neuropathy or memory of present cervical myelopathy.    2.  Bilateral hand paresthesias.  May have superimposed median neuropathy at the wrist versus ulnar neuropathy at the elbow.        Discussion:    1.  I discussed the diagnosis and treatment options.  We discussed the options of imaging along with diagnostics such as EMG medications.  She is already in therapy for her hand started yesterday.    2.  Given the chronic cervical spine pain and hyperreflexia on top of the upper extremity paresthesias would recommend MRI of the cervical spine to evaluate.    3.  EMG of the bilateral upper extremities to evaluate for median neuropathy at the wrist versus ulnar neuropathy at the elbow as well as cervical radiculopathy.    4.  Trial gabapentin 100 mg at bedtime increasing to 300 mg for myofascial pain and paresthesias.    5.  Continue to wear  carpal tunnel splint at night.    6.  Follow-up after EMG and MRI.      It was our pleasure caring for your patient today, if there any questions or concerns please do not hesitate to contact us.      Subjective:   Patient ID: Justyna Zhang is a 52 year old female.    History of Present Illness: Patient presents at the request of Judy David for evaluation of bilateral hand paresthesias with the patient also has cervical spine parascapular pain and carries a diagnosis of fibromyalgia.  She has had fibromyalgia for the past 30 years.  Has had neck pain parascapular pain as well as low back pain and leg pain.  Over the past 5 years she has developed numbness and tingling in both hands mostly digits 4 and 5 seems to be worse on the right.  She is right-handed.  Seems to be worse with activity such as reading at night wakes her up and with driving.  Has to shake her hands out.  She has switched from books to [employer] and still now is having more issues.  She feels there is some weakness in her upper extremities.  Has not had any treatment other than starting physical therapy yesterday and has a carpal tunnel brace which she started to wear in her right hand.  Pain is an 8/10 at worst 5/10 today 3/10 at best.  Has been taking tizanidine which does help her sleep.      Imaging: None available       Review of Systems: Complains of weight loss ringing in the ears, reflux, joint pain muscle pain muscle fatigue.  Denies fevers, hoarseness, change in vision chest pain, shortness of breath, abdominal pain, nausea vomiting, bowel or bladder incontinence, skin issues, balance issues.  Remainder of 12 point review systems negative unless listed above.    Past Medical History:   Diagnosis Date     Fibromyalgia        Family History   Adopted: Yes   Problem Relation Age of Onset     Diabetes Mother      Unknown/Adopted Other          Social History     Socioeconomic History     Marital status:      Spouse name: None      Number of children: None     Years of education: None     Highest education level: None   Tobacco Use     Smoking status: Never Smoker     Smokeless tobacco: Never Used   Substance and Sexual Activity     Alcohol use: Yes     Comment: monthly     Drug use: No     Sexual activity: Yes     Birth control/protection: Male Surgical   Other Topics Concern     Parent/sibling w/ CABG, MI or angioplasty before 65F 55M? No     Social history: .  Works in customer service.  No tobacco.  Does drink alcohol on the weekends.    The following portions of the patient's history were reviewed and updated as appropriate: allergies, current medications, past family history, past medical history, past social history, past surgical history and problem list.    Oswestry (IRENE) Questionnaire    No flowsheet data found.    Neck Disability Index:  Neck Disability Index (  Anatoliy H. and Gianfranco GRIMALDO. 1991. All rights reserved.; used with permission) 7/14/2022   SECTION 1 - PAIN INTENSITY 2   SECTION 2 - PERSONAL CARE 0   SECTION 3 - LIFTING 1   SECTION 4 - READING 2   SECTION 5 - HEADACHES 2   SECTION 6 - CONCENTRATION 1   SECTION 7 - WORK 2   SECTION 8 - DRIVING 2   SECTION 9 - SLEEPING 2   SECTION 10 - RECREATION 1   Count 10   Sum 15   Raw Score: /50 15   Neck Disability Index Score: (%) 30          PHQ-2 Score:     PHQ-2 ( 1999 Pfizer) 6/2/2022 6/2/2022   Q1: Little interest or pleasure in doing things 0 -   Q2: Feeling down, depressed or hopeless 0 -   PHQ-2 Score 0 -   PHQ-2 Total Score (12-17 Years)- Positive if 3 or more points; Administer PHQ-A if positive - -   Q1: Little interest or pleasure in doing things Not at all Not at all   Q2: Feeling down, depressed or hopeless Not at all Not at all   PHQ-2 Score 0 0                  Objective:   Physical Exam:    /75   Pulse 86   Wt 221 lb (100.2 kg)   BMI 38.53 kg/m    Body mass index is 38.53 kg/m .      General:  Well-appearing female in no acute distress.  Pleasant,  cooperative, and interactive throughout the examination and interview.  CV: No lower extremity edema on inspection or paltation.  Lymphatics: No cervical lymphadenopathy palpated.  Eyes: sclera clear.  Skin: No rashes or lesions seen over the head/neck, hairline, arms, legs, trunk.  Respirations unlabored.  MSK: Gait is nonantalgic.  Able to heel-toe walk without difficulty.  Negative Romberg.  Spine: normal AP curves of the C, T, and L spine.  Full range of motion in the cervical spine in flexion extension, mild decreased rotation bilaterally.  Palpation: Tenderness to palpation over cervical paraspinals and parascapular muscles bilaterally.  Extremities: Full range of motion of the shoulders, elbows, and wrists with no effusions or tenderness to palpation.  Negative arm drop, empty can, and Speed's test bilaterally.  Mild positive Tinel's at the elbows bilaterally and wrist on the right.  Negative Weeks and Neer's test bilaterally.  Full range of motion of the  knees, and ankles from a seated position with no effusions or tenderness to palpation. No hypermobility of the upper or lower extremities.  Neurologic exam: Mental status: Patient is alert and oriented with normal affect.  Attention, knowledge, memory, and language are intact.  Normal coordination throughout the examination.  Reflexes are 2+ and symmetric biceps, triceps, brachioradialis, patellar, and Achilles with positive bilateral Miranda's.  Sensation is intact to light touch throughout the upper and lower extremities bilaterally.  Manual muscle testing reveals 5 out of 5 strength in the shoulder abductors, elbow flexors/extensors, wrist extensors, interosseous, and finger flexors; lower extremity strength appears grossly normal.   Normal muscle bulk and tone in the arms and legs.    Mild positive Spurling's to the right.

## 2022-07-14 NOTE — LETTER
7/14/2022         RE: Justyna Zhang  [address] [facility] [address]        Dear Colleague,    Thank you for referring your patient, Justyna Zhang, to the [facility]. Please see a copy of my visit note below.    Assessment/Plan:      Justyna was seen today for neck pain and shoulder pain.    Diagnoses and all orders for this visit:    Cervical spine pain  -     MR Cervical Spine w/o Contrast; Future    Paresthesia of hand, bilateral  -     MR Cervical Spine w/o Contrast; Future  -     EMG; Future  -     gabapentin (NEURONTIN) 100 MG capsule; 100mg at bedtime x 3 days, then may increase to 200mg x 3 days, then may increase to 300mg at bedtime    Hyper reflexia  -     MR Cervical Spine w/o Contrast; Future    Myofascial pain  -     gabapentin (NEURONTIN) 100 MG capsule; 100mg at bedtime x 3 days, then may increase to 200mg x 3 days, then may increase to 300mg at bedtime    Other orders  -     Orthopedic  Referral         Assessment: Pleasant 52 year old female with history of fibromyalgia with:    1.  Chronic cervical spine pain parascapular pain with bilateral hand and arm paresthesias.  She has hyperreflexia as well.  This could represent myofascial pain in the setting of fibromyalgia with superimposed mononeuropathies in the upper EXTR such as carpal tunnel syndrome versus ulnar neuropathy or memory of present cervical myelopathy.    2.  Bilateral hand paresthesias.  May have superimposed median neuropathy at the wrist versus ulnar neuropathy at the elbow.        Discussion:    1.  I discussed the diagnosis and treatment options.  We discussed the options of imaging along with diagnostics such as EMG medications.  She is already in therapy for her hand started yesterday.    2.  Given the chronic cervical spine pain and hyperreflexia on top of the upper extremity paresthesias would recommend MRI of the cervical spine to evaluate.    3.  EMG of the bilateral upper  extremities to evaluate for median neuropathy at the wrist versus ulnar neuropathy at the elbow as well as cervical radiculopathy.    4.  Trial gabapentin 100 mg at bedtime increasing to 300 mg for myofascial pain and paresthesias.    5.  Continue to wear carpal tunnel splint at night.    6.  Follow-up after EMG and MRI.      It was our pleasure caring for your patient today, if there any questions or concerns please do not hesitate to contact us.      Subjective:   Patient ID: Justyna Zhang is a 52 year old female.    History of Present Illness: Patient presents at the request of Judy David for evaluation of bilateral hand paresthesias with the patient also has cervical spine parascapular pain and carries a diagnosis of fibromyalgia.  She has had fibromyalgia for the past 30 years.  Has had neck pain parascapular pain as well as low back pain and leg pain.  Over the past 5 years she has developed numbness and tingling in both hands mostly digits 4 and 5 seems to be worse on the right.  She is right-handed.  Seems to be worse with activity such as reading at night wakes her up and with driving.  Has to shake her hands out.  She has switched from [employer] to [employer] and still now is having more issues.  She feels there is some weakness in her upper extremities.  Has not had any treatment other than starting physical therapy yesterday and has a carpal tunnel brace which she started to wear in her right hand.  Pain is an 8/10 at worst 5/10 today 3/10 at best.  Has been taking tizanidine which does help her sleep.      Imaging: None available       Review of Systems: Complains of weight loss ringing in the ears, reflux, joint pain muscle pain muscle fatigue.  Denies fevers, hoarseness, change in vision chest pain, shortness of breath, abdominal pain, nausea vomiting, bowel or bladder incontinence, skin issues, balance issues.  Remainder of 12 point review systems negative unless listed above.    Past Medical  History:   Diagnosis Date     Fibromyalgia        Family History   Adopted: Yes   Problem Relation Age of Onset     Diabetes Mother      Unknown/Adopted Other          Social History     Socioeconomic History     Marital status:      Spouse name: None     Number of children: None     Years of education: None     Highest education level: None   Tobacco Use     Smoking status: Never Smoker     Smokeless tobacco: Never Used   Substance and Sexual Activity     Alcohol use: Yes     Comment: monthly     Drug use: No     Sexual activity: Yes     Birth control/protection: Male Surgical   Other Topics Concern     Parent/sibling w/ CABG, MI or angioplasty before 65F 55M? No     Social history: .  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  No tobacco.  Does drink alcohol on the weekends.    The following portions of the patient's history were reviewed and updated as appropriate: allergies, current medications, past family history, past medical history, past social history, past surgical history and problem list.    Oswestry (IRENE) Questionnaire    No flowsheet data found.    Neck Disability Index:  Neck Disability Index (  Anatoliy MACKEY. and Gianfranco GRIMALDO. 1991. All rights reserved.; used with permission) 7/14/2022   SECTION 1 - PAIN INTENSITY 2   SECTION 2 - PERSONAL CARE 0   SECTION 3 - LIFTING 1   SECTION 4 - READING 2   SECTION 5 - HEADACHES 2   SECTION 6 - CONCENTRATION 1   SECTION 7 - WORK 2   SECTION 8 - DRIVING 2   SECTION 9 - SLEEPING 2   SECTION 10 - RECREATION 1   Count 10   Sum 15   Raw Score: /50 15   Neck Disability Index Score: (%) 30          PHQ-2 Score:     PHQ-2 ( 1999 Pfizer) 6/2/2022 6/2/2022   Q1: Little interest or pleasure in doing things 0 -   Q2: Feeling down, depressed or hopeless 0 -   PHQ-2 Score 0 -   PHQ-2 Total Score (12-17 Years)- Positive if 3 or more points; Administer PHQ-A if positive - -   Q1: Little interest or pleasure in doing things Not at all Not at all   Q2: Feeling down, depressed or hopeless  Not at all Not at all   PHQ-2 Score 0 0                  Objective:   Physical Exam:    /75   Pulse 86   Wt 221 lb (100.2 kg)   BMI 38.53 kg/m    Body mass index is 38.53 kg/m .      General:  Well-appearing female in no acute distress.  Pleasant, cooperative, and interactive throughout the examination and interview.  CV: No lower extremity edema on inspection or paltation.  Lymphatics: No cervical lymphadenopathy palpated.  Eyes: sclera clear.  Skin: No rashes or lesions seen over the head/neck, hairline, arms, legs, trunk.  Respirations unlabored.  MSK: Gait is nonantalgic.  Able to heel-toe walk without difficulty.  Negative Romberg.  Spine: normal AP curves of the C, T, and L spine.  Full range of motion in the cervical spine in flexion extension, mild decreased rotation bilaterally.  Palpation: Tenderness to palpation over cervical paraspinals and parascapular muscles bilaterally.  Extremities: Full range of motion of the shoulders, elbows, and wrists with no effusions or tenderness to palpation.  Negative arm drop, empty can, and Speed's test bilaterally.  Mild positive Tinel's at the elbows bilaterally and wrist on the right.  Negative Weesk and Neer's test bilaterally.  Full range of motion of the  knees, and ankles from a seated position with no effusions or tenderness to palpation. No hypermobility of the upper or lower extremities.  Neurologic exam: Mental status: Patient is alert and oriented with normal affect.  Attention, knowledge, memory, and language are intact.  Normal coordination throughout the examination.  Reflexes are 2+ and symmetric biceps, triceps, brachioradialis, patellar, and Achilles with positive bilateral Miranda's.  Sensation is intact to light touch throughout the upper and lower extremities bilaterally.  Manual muscle testing reveals 5 out of 5 strength in the shoulder abductors, elbow flexors/extensors, wrist extensors, interosseous, and finger flexors; lower extremity  strength appears grossly normal.   Normal muscle bulk and tone in the arms and legs.    Mild positive Spurling's to the right.            Again, thank you for allowing me to participate in the care of your patient.        Sincerely,        Trav Townsend, DO

## 2022-07-14 NOTE — PATIENT INSTRUCTIONS
An MRI was ordered for you today.  You will be contacted by scheduling within 3 days.    If you are not contacted, please call Radiology at [phone].    2. EMG of your hands to look for nerve impingement  3. Prescribed Gabapentin today, 100 mg tablets, to be titrated up to 3 tablets at bedtime as tolerated for your nerve pain. Please follow Gabapentin dosing chart below.     Gabapentin 100mg Dosing Chart    DATE  MORNING AFTERNOON BEDTIME    Day 1 0 0 1    Day 2 0 0 1    Day 3 0 0 1    Day 4 0 0 2    Day 5 0 0 2    Day 6 0 0 2    Day 7 0 0 3    Day 8 0 0 3    Day 9 0 0 3                                                                                                                                    Continue medication, taking 3 capsules at bedtime    Please call if you have any questions regarding how to take your medication  Clinic Phone # [phone]

## 2022-07-22 ENCOUNTER — NURSE TRIAGE (OUTPATIENT)
Dept: NURSING | Facility: CLINIC | Age: 52
End: 2022-07-22

## 2022-07-22 NOTE — TELEPHONE ENCOUNTER
"Patient calling after rash began yesterday afternoon.  Patient is on day # 6 of starting gabapentin, had increase of dose to 300 mg on 7-20-22.  Patient stated rash on both arms began yesterday before evening dose of gabapentin.  Patient has not taken antihistamine.  Today patient states \"rash moves around\".  Rash is described as itchy, clear/pink, small bumps (similar to mosquito bite).  Disposition is to be seen in office today.  This writer explained that if office visit not available, she should be assessed in [facility].  Patient verbalized understanding and agrees with plan.     Cora Abreu RN  [facility] Nurse Advisor  9:30 AM 7/22/2022      Reason for Disposition    Taking a new medicine now or within last 3 days (Exception: antihistamine, decongestant or other OTC cough/cold medicines)    Hives or itching    Additional Information    Negative: Difficulty breathing or wheezing now    Negative: Rapid onset of swollen tongue    Negative: Rapid onset of hoarseness or cough    Negative: Very weak (can't stand)    Negative: Difficult to awaken or acting confused (e.g., disoriented, slurred speech)    Negative: Life-threatening reaction (anaphylaxis) in the past to similar substance (e.g., food, insect bite/sting, chemical, etc.) and < 2 hours since exposure    Negative: Sounds like a life-threatening emergency to the triager    Negative: Bee, wasp, or yellow jacket sting within last 24 hours    Negative: Difficulty breathing or wheezing    Negative: Hoarseness or cough that started soon after 1st dose of drug    Negative: Swollen tongue that started soon after first dose of drug    Negative: Fever and purple or blood-colored spots or dots    Negative: Too weak or sick to stand    Negative: Sounds like a life-threatening emergency to the triager    Negative: Rash is only on 1 part of the body (localized)    Negative: Taking new non-prescription (OTC) antihistamine, decongestant, ear drops, eye drops, or " other OTC cough/cold medicine    Negative: Taking new prescription antihistamine, allergy medicine, asthma medicine, eye drops, ear drops or nose drops    Negative: Rash started more than 3 days after stopping new prescription medicine    Negative: Swollen tongue    Negative: Widespread hives and onset < 2 hours of exposure to 1st dose of drug    Negative: Patient sounds very sick or weak to the triager    Negative: Fever    Negative: Face or lip swelling    Negative: Purple or blood-colored spots or dots (no fever and sounds well to triager)    Negative: Joint pain or swelling    Negative: Bloody crusts on lips or in mouth    Negative: Large or small blisters on skin (i.e., fluid filled bubbles or sacs)    Negative: Pregnant    Negative: Rash beginning within 4 hours of a new prescription medication    Protocols used: HIVES-A-OH, RASH - WIDESPREAD ON DRUGS-A-OH  COVID 19 Nurse Triage Plan/Patient Instructions    Please be aware that novel coronavirus (COVID-19) may be circulating in the community. If you develop symptoms such as fever, cough, or SOB or if you have concerns about the presence of another infection including coronavirus (COVID-19), please contact your health care provider or visit [email or web address].     Disposition/Instructions    In-Person Visit with provider recommended. Reference Visit Selection Guide.    Thank you for taking steps to prevent the spread of this virus.  o Limit your contact with others.  o Wear a simple mask to cover your cough.  o Wash your hands well and often.    Resources    [facility]: About COVID-19: [email or web address]    CDC: What to Do If You're Sick: www.cdc.gov/coronavirus/2019-ncov/about/steps-when-sick.html    CDC: Ending Home Isolation: www.cdc.gov/coronavirus/2019-ncov/hcp/disposition-in-home-patients.html     CDC: Caring for Someone: www.cdc.gov/coronavirus/2019-ncov/if-you-are-sick/care-for-someone.html     [facility]: Interim Guidance for  Hospital Discharge to Home: www.health.[facility].[place]/diseases/coronavirus/hcp/hospdischarge.pdf    [facility] clinical trials (COVID-19 research studies): [email or web address]/[email or web address]-clinical-trials     Below are the COVID-19 hotlines at the [place] [facility] ([facility]). Interpreters are available.   o For health questions: Call 470-111-9991 or 1-983.675.8033 (7 a.m. to 7 p.m.)  o For questions about schools and childcare: Call 574-624-6533 or 1-287.969.7811 (7 a.m. to 7 p.m.)

## 2022-07-26 ENCOUNTER — HOSPITAL ENCOUNTER (OUTPATIENT)
Dept: PHYSICAL THERAPY | Facility: CLINIC | Age: 52
Setting detail: THERAPIES SERIES
Discharge: HOME | End: 2022-07-26
Attending: PHYSICIAN ASSISTANT
Payer: COMMERCIAL

## 2022-07-26 PROCEDURE — 97116 GAIT TRAINING THERAPY: CPT | Mod: GP | Performed by: PHYSICAL THERAPIST

## 2022-07-26 PROCEDURE — 97110 THERAPEUTIC EXERCISES: CPT | Mod: GP | Performed by: PHYSICAL THERAPIST

## 2022-07-29 ENCOUNTER — HOSPITAL ENCOUNTER (OUTPATIENT)
Dept: MRI IMAGING | Facility: HOSPITAL | Age: 52
Discharge: HOME | End: 2022-07-29
Attending: PHYSICAL MEDICINE & REHABILITATION | Admitting: PHYSICAL MEDICINE & REHABILITATION
Payer: COMMERCIAL

## 2022-07-29 DIAGNOSIS — R20.2 PARESTHESIA OF HAND, BILATERAL: ICD-10-CM

## 2022-07-29 DIAGNOSIS — R29.2 HYPER REFLEXIA: ICD-10-CM

## 2022-07-29 DIAGNOSIS — M54.2 CERVICAL SPINE PAIN: ICD-10-CM

## 2022-07-29 PROCEDURE — 72141 MRI NECK SPINE W/O DYE: CPT

## 2022-08-04 ENCOUNTER — OFFICE VISIT (OUTPATIENT)
Dept: PHYSICAL MEDICINE AND REHAB | Facility: CLINIC | Age: 52
End: 2022-08-04
Attending: PHYSICAL MEDICINE & REHABILITATION
Payer: COMMERCIAL

## 2022-08-04 VITALS — HEART RATE: 77 BPM | DIASTOLIC BLOOD PRESSURE: 79 MMHG | SYSTOLIC BLOOD PRESSURE: 121 MMHG

## 2022-08-04 DIAGNOSIS — R29.2 HYPER REFLEXIA: ICD-10-CM

## 2022-08-04 DIAGNOSIS — M50.30 DDD (DEGENERATIVE DISC DISEASE), CERVICAL: ICD-10-CM

## 2022-08-04 DIAGNOSIS — R20.2 PARESTHESIA OF HAND, BILATERAL: ICD-10-CM

## 2022-08-04 DIAGNOSIS — M54.2 CERVICAL SPINE PAIN: Primary | ICD-10-CM

## 2022-08-04 DIAGNOSIS — M79.18 MYOFASCIAL PAIN: ICD-10-CM

## 2022-08-04 PROCEDURE — 99213 OFFICE O/P EST LOW 20 MIN: CPT | Mod: 25 | Performed by: PHYSICAL MEDICINE & REHABILITATION

## 2022-08-04 PROCEDURE — 95911 NRV CNDJ TEST 9-10 STUDIES: CPT | Performed by: PHYSICAL MEDICINE & REHABILITATION

## 2022-08-04 PROCEDURE — 95886 MUSC TEST DONE W/N TEST COMP: CPT | Mod: RT | Performed by: PHYSICAL MEDICINE & REHABILITATION

## 2022-08-04 ASSESSMENT — PAIN SCALES - GENERAL: PAINLEVEL: MODERATE PAIN (4)

## 2022-08-04 NOTE — PROGRESS NOTES
[facility]  [address] [place] [address] [address]  [place] [address]  Office: [phone] Fax: [phone]    Electromyography and Nerve Conduction Study Report        Indication: Patient presents at the request of Dr. Trav alicea for bilateral upper extremity EMG.  She has numbness and tingling bilateral hands right greater than left mostly digits 4 and 5.  She is right-handed.  She has some cervical spine pain parascapular pain as well.  On exam she has normal sensation to light touch through the upper extremities, normal muscle strength of the major muscular's of the bilateral upper extremities, equivocal right and mildly positive left Miranda's.          Pt Exam Discussion (Communication Barriers):  Electromyography and nerve conduction testing, including associated discomfort, was discussed with the patient prior to the procedure.  No learning/ communication barriers; patient verbalized understanding of procedure.  Informed consent was obtained.           Pt Assessment:  Testing was successfully completed; patient tolerated testing well.       Blood Thinners: None Skin Temperature: Warmed  31.8                   EMG/NCS  results:     Nerve Conduction Studies  Motor Sites      Amplitude Segment CV Distal Latency F-Latency F-Estimate Temp Comment   Site (mV)  (m/s) (ms) ms ms  C    Left Median (APB) Motor   Wrist 7.3 Wrist-APB  2.9   23.8    Elbow 7.1 Elbow-Wrist 57 6.7   23.8    Right Median (APB) Motor   Wrist 6.1 Wrist-APB  3.2   23.5    Elbow 5.1 Elbow-Wrist 55 6.9   23.5    Left Ulnar (ADM) Motor   Wrist 10.4   2.2   23.8    Bel Elbow 9.3 Bel Elbow-Wrist 60 5.6   23.8    Ab Elbow 9.3 Ab Elbow-Wrist 65 7.0   23.8    Right Ulnar (ADM) Motor   Wrist 12.4   2.3   23.6    Bel Elbow 9.4 Bel Elbow-Wrist 59 5.7   23.6    Ab Elbow 9.0 Ab Elbow-Wrist 63 7.4   23.6      Sensory Sites      Amplitude CV Onset Lat Peak Lat Temp Comment   Site ( V) (m/s) (ms) (ms)  C    Right Median Anti  Sensory   Wrist-Dig II 32 54 2.4 2.9 25.6    Left Median-Ulnar Palmar Sensory        Median   Palm-Wrist 58 60 1.33 1.65 23.6         Ulnar   Palm-Wrist 17 67 1.20 1.63 23.7    Right Median-Ulnar Palmar Sensory        Median   Palm-Wrist 118 53 1.50 1.98 23.6         Ulnar   Palm-Wrist 33 74 1.08 1.48 23.6    Right Ulnar Anti Sensory   Wrist-Dig V 39 67 1.63 2.3 24.4        NCS Waveforms:    Motor                Sensory                  Electromyography     Side Muscle Nerve Root Ins Act Fibs Psw Amp Dur Poly Recrt Int Pat Comment   Right Deltoid Axillary C5-6 Nml Nml Nml Nml Nml 0 Nml Nml    Right Triceps Radial C6-7-8 Nml Nml Nml Nml Nml 0 Nml Nml    Right PronatorTeres Median C6-7 Nml Nml Nml Nml Nml 0 Nml Nml    Right 1stDorInt Ulnar C8-T1 Nml Nml Nml Nml Nml 0 Nml Nml    Right Abd Poll Brev Median C8-T1 Nml Nml Nml Nml Nml 0 Nml Nml    Left Deltoid Axillary C5-6 Nml Nml Nml Nml Nml 0 Nml Nml    Left Triceps Radial C6-7-8 Nml Nml Nml Nml Nml 0 Nml Nml    Left PronatorTeres Median C6-7 Nml Nml Nml Nml Nml 0 Nml Nml    Left 1stDorInt Ulnar C8-T1 Nml Nml Nml Nml Nml 0 Nml Nml    Left Abd Poll Brev Median C8-T1 Nml Nml Nml Nml Nml 0 Nml Nml        Comment NCS: Abnormal study:    1.  Prolonged right normal left median versus ulnar transcarpal latency comparison.  2.  Normal right median and ulnar SNAPs, bilateral ulnar transcarpal studies, left median transcarpal study, and bilateral median and ulnar CMAPs.    Comment EMG: Normal study.  1.  Normal needle EMG bilateral upper extremities.     Interpretation: Abnormal study: There is electrodiagnostic evidence of:    1.  Right median neuropathy at the wrist consistent with entrapment in the carpal tunnel, very mild in severity.    2. There is no electrodiagnostic evidence of cervical radiculopathy, brachial plexopathy, or ulnar neuropathy in the bilateral upper extremities or median neuropathy at the wrist in the left upper extremity.    The testing was completed in  its entirety by the physician.      It was our pleasure caring for your patient today, if there any questions or concerns please do not hesitate to contact us.

## 2022-08-04 NOTE — LETTER
8/4/2022         RE: Justyna Zhang  [address] [facility] [address]        Dear Colleague,    Thank you for referring your patient, Justyna Zhang, to the [facility]. Please see a copy of my visit note below.    Assessment/Plan:      Justyna was seen today for emg.    Diagnoses and all orders for this visit:    Cervical spine pain  -     Physical Therapy Referral; Future    Paresthesia of hand, bilateral  -     EMG  -     Physical Therapy Referral; Future  -     [place] NEEDLE EMG EA EXTREMTY W/PARASPINAL AREA COMPLETE  -     [place] NCS MOTOR W OR W/O F-WAVE, 9 OR 10    DDD (degenerative disc disease), cervical  -     Physical Therapy Referral; Future    Hyper reflexia  -     Physical Therapy Referral; Future    Myofascial pain  -     Physical Therapy Referral; Future         Assessment: Pleasant 52 year old female with history of fibromyalgia with:     1.  Chronic cervical spine pain parascapular pain with bilateral hand and arm paresthesias.  She has mild hyperreflexia as well which is likely incidental.  She has mild degenerative disc disease with a small central disc protrusion at C5-6 contacting the spinal cord no spinal cord compression and no significant foraminal stenosis.      2.  Bilateral hand paresthesias.  She has right median neuropathy at the wrist on EMG, very mild.  Evidence of ulnar neuropathy on EMG.   symptoms are likely related to carpal tunnel syndrome on the right and myofascial pain.      3.  Myofascial pain right parascapular greater than left parascapular region.              Discussion:    1.  I discussed the EMG results.  I discussed the MRI results as ordered at last visit.  She is doing better with gabapentin and started physical therapy for hand therapy.  We discussed options regarding carpal tunnel such as decompression, injections continuing therapy continue medications reassurance provided regarding the cervical spine.    2.  Continue to wear braces  at night for carpal tunnel.    3.  New physical therapy prescription provided for cervical parascapular strengthening.    4.  She can try TENS unit over-the-counter if she wishes for the parascapular pain.    5.  Continue gabapentin 300 mg at bedtime.      6. Follow-up with me in 3 months.  She can return sooner for osteopathic manipulation for her cervical and parascapular pain if she wishes.      It was our pleasure caring for your patient today, if there any questions or concerns please do not hesitate to contact us.    Over 20 minutes were spent on the date of the encounter performing chart review, patient visit and documentation in addition to any procedure.      Subjective:   Patient ID: Justyna Zhang is a 52 year old female.    History of Present Illness: Patient presents for follow-up of cervical spine pain parascapular pain right greater than left right arm greater than left arm pain and paresthesias mostly digits 4 and 5.  Pain is an 8/10 at worst 5/10 today 2/10 at best.  Better with wearing the braces at night gabapentin is very helpful up to 300 mg at bedtime helps her sleep and helps with the paresthesias and parascapular pain.  Better with rest and using her carpal tunnel splints.  Started physical therapy for hand therapy doing some home exercises but only had 1 visit.        EMG: Performed today was reviewed showing right median neuropathy at the wrist consistent with entrapment in the carpal tunnel, very mild.  No cervical radiculopathy, brachial plexopathy, ulnar neuropathy in the bilateral upper extremities or median neuropathy at the wrist and left upper extremity.    Imaging: MRI report and images were personally reviewed and discussed with the patient.  A plastic model was utilized during the discussion.  MRI of the cervical spine shows normal disc heights throughout the cervical spine with small left central disc protrusion at C5-6 with mild central stenosis no foraminal stenosis.  No  central foraminal stenosis throughout the remainder of the cervical spine.  No high-grade spinal cord compression at any level.    Review of Systems: *Pertinent positives: Numbness tingling weakness bilateral hands worse at night difficulty with coordination.  Pertinent negatives:    No bowel or bladder incontinence.  No urinary retention.  No fevers, unintentional weight loss, balance changes, headaches, frequent falling, difficulty swallowing  .  All others reviewed are negative.           Past Medical History:   Diagnosis Date     Fibromyalgia        The following portions of the patient's history were reviewed and updated as appropriate: allergies, current medications, past family history, past medical history, past social history, past surgical history and problem list.           Objective:   Physical Exam:    /79   Pulse 77   There is no height or weight on file to calculate BMI.      General: Alert and oriented with normal affect. Attention, knowledge, memory, and language are intact. No acute distress.   Eyes: Sclerae are clear.  Respirations: Unlabored.  Skin: No rashes seen on the arms.    Gait:  Nonantalgic  Tenderness over the right parascapular region with hypertonic tissue textures.  Sensation is intact to light touch throughout the upper   extremities.  Reflexes are 2+ and symmetric in the biceps triceps and brachioradialis with Hoffmans equivocal right, mildly positive on the left .      Manual muscle testing reveals:  Right /Left out of 5  5/5 shoulder abductors  5/5 elbow flexors  5/5 elbow extensors  5/5 wrist extensors  5/5 interosseus  5/5 finger flexors       [facility]  [address] [place] [id number]  Office: [phone] Fax: [phone]    Electromyography and Nerve Conduction Study Report        Indication: Patient presents at the request of Dr. Trav alicea for bilateral upper extremity EMG.  She has numbness and tingling  bilateral hands right greater than left mostly digits 4 and 5.  She is right-handed.  She has some cervical spine pain parascapular pain as well.  On exam she has normal sensation to light touch through the upper extremities, normal muscle strength of the major muscular's of the bilateral upper extremities, equivocal right and mildly positive left Miranda's.          Pt Exam Discussion (Communication Barriers):  Electromyography and nerve conduction testing, including associated discomfort, was discussed with the patient prior to the procedure.  No learning/ communication barriers; patient verbalized understanding of procedure.  Informed consent was obtained.           Pt Assessment:  Testing was successfully completed; patient tolerated testing well.       Blood Thinners: None Skin Temperature: Warmed  31.8                   EMG/NCS  results:     Nerve Conduction Studies  Motor Sites      Amplitude Segment CV Distal Latency F-Latency F-Estimate Temp Comment   Site (mV)  (m/s) (ms) ms ms  C    Left Median (APB) Motor   Wrist 7.3 Wrist-APB  2.9   23.8    Elbow 7.1 Elbow-Wrist 57 6.7   23.8    Right Median (APB) Motor   Wrist 6.1 Wrist-APB  3.2   23.5    Elbow 5.1 Elbow-Wrist 55 6.9   23.5    Left Ulnar (ADM) Motor   Wrist 10.4   2.2   23.8    Bel Elbow 9.3 Bel Elbow-Wrist 60 5.6   23.8    Ab Elbow 9.3 Ab Elbow-Wrist 65 7.0   23.8    Right Ulnar (ADM) Motor   Wrist 12.4   2.3   23.6    Bel Elbow 9.4 Bel Elbow-Wrist 59 5.7   23.6    Ab Elbow 9.0 Ab Elbow-Wrist 63 7.4   23.6      Sensory Sites      Amplitude CV Onset Lat Peak Lat Temp Comment   Site ( V) (m/s) (ms) (ms)  C    Right Median Anti Sensory   Wrist-Dig II 32 54 2.4 2.9 25.6    Left Median-Ulnar Palmar Sensory        Median   Palm-Wrist 58 60 1.33 1.65 23.6         Ulnar   Palm-Wrist 17 67 1.20 1.63 23.7    Right Median-Ulnar Palmar Sensory        Median   Palm-Wrist 118 53 1.50 1.98 23.6         Ulnar   Palm-Wrist 33 74 1.08 1.48 23.6    Right Ulnar Anti  Sensory   Wrist-Dig V 39 67 1.63 2.3 24.4        NCS Waveforms:    Motor                Sensory                  Electromyography     Side Muscle Nerve Root Ins Act Fibs Psw Amp Dur Poly Recrt Int Pat Comment   Right Deltoid Axillary C5-6 Nml Nml Nml Nml Nml 0 Nml Nml    Right Triceps Radial C6-7-8 Nml Nml Nml Nml Nml 0 Nml Nml    Right PronatorTeres Median C6-7 Nml Nml Nml Nml Nml 0 Nml Nml    Right 1stDorInt Ulnar C8-T1 Nml Nml Nml Nml Nml 0 Nml Nml    Right Abd Poll Brev Median C8-T1 Nml Nml Nml Nml Nml 0 Nml Nml    Left Deltoid Axillary C5-6 Nml Nml Nml Nml Nml 0 Nml Nml    Left Triceps Radial C6-7-8 Nml Nml Nml Nml Nml 0 Nml Nml    Left PronatorTeres Median C6-7 Nml Nml Nml Nml Nml 0 Nml Nml    Left 1stDorInt Ulnar C8-T1 Nml Nml Nml Nml Nml 0 Nml Nml    Left Abd Poll Brev Median C8-T1 Nml Nml Nml Nml Nml 0 Nml Nml        Comment NCS: Abnormal study:    1.  Prolonged right normal left median versus ulnar transcarpal latency comparison.  2.  Normal right median and ulnar SNAPs, bilateral ulnar transcarpal studies, left median transcarpal study, and bilateral median and ulnar CMAPs.    Comment EMG: Normal study.  1.  Normal needle EMG bilateral upper extremities.     Interpretation: Abnormal study: There is electrodiagnostic evidence of:    1.  Right median neuropathy at the wrist consistent with entrapment in the carpal tunnel, very mild in severity.    2. There is no electrodiagnostic evidence of cervical radiculopathy, brachial plexopathy, or ulnar neuropathy in the bilateral upper extremities or median neuropathy at the wrist in the left upper extremity.    The testing was completed in its entirety by the physician.      It was our pleasure caring for your patient today, if there any questions or concerns please do not hesitate to contact us.        Again, thank you for allowing me to participate in the care of your patient.        Sincerely,        Trav Townsend,

## 2022-08-04 NOTE — PROGRESS NOTES
Assessment/Plan:      Justyna was seen today for emg.    Diagnoses and all orders for this visit:    Cervical spine pain  -     Physical Therapy Referral; Future    Paresthesia of hand, bilateral  -     EMG  -     Physical Therapy Referral; Future  -     MI NEEDLE EMG EA EXTREMTY W/PARASPINAL AREA COMPLETE  -     [place] NCS MOTOR W OR W/O F-WAVE, 9 OR 10    DDD (degenerative disc disease), cervical  -     Physical Therapy Referral; Future    Hyper reflexia  -     Physical Therapy Referral; Future    Myofascial pain  -     Physical Therapy Referral; Future         Assessment: Pleasant 52 year old female with history of fibromyalgia with:     1.  Chronic cervical spine pain parascapular pain with bilateral hand and arm paresthesias.  She has mild hyperreflexia as well which is likely incidental.  She has mild degenerative disc disease with a small central disc protrusion at C5-6 contacting the spinal cord no spinal cord compression and no significant foraminal stenosis.      2.  Bilateral hand paresthesias.  She has right median neuropathy at the wrist on EMG, very mild.  Evidence of ulnar neuropathy on EMG.   symptoms are likely related to carpal tunnel syndrome on the right and myofascial pain.      3.  Myofascial pain right parascapular greater than left parascapular region.              Discussion:    1.  I discussed the EMG results.  I discussed the MRI results as ordered at last visit.  She is doing better with gabapentin and started physical therapy for hand therapy.  We discussed options regarding carpal tunnel such as decompression, injections continuing therapy continue medications reassurance provided regarding the cervical spine.    2.  Continue to wear braces at night for carpal tunnel.    3.  New physical therapy prescription provided for cervical parascapular strengthening.    4.  She can try TENS unit over-the-counter if she wishes for the parascapular pain.    5.  Continue gabapentin 300 mg at bedtime.       6. Follow-up with me in 3 months.  She can return sooner for osteopathic manipulation for her cervical and parascapular pain if she wishes.      It was our pleasure caring for your patient today, if there any questions or concerns please do not hesitate to contact us.    Over 20 minutes were spent on the date of the encounter performing chart review, patient visit and documentation in addition to any procedure.      Subjective:   Patient ID: Justyna Zhang is a 52 year old female.    History of Present Illness: Patient presents for follow-up of cervical spine pain parascapular pain right greater than left right arm greater than left arm pain and paresthesias mostly digits 4 and 5.  Pain is an 8/10 at worst 5/10 today 2/10 at best.  Better with wearing the braces at night gabapentin is very helpful up to 300 mg at bedtime helps her sleep and helps with the paresthesias and parascapular pain.  Better with rest and using her carpal tunnel splints.  Started physical therapy for hand therapy doing some home exercises but only had 1 visit.        EMG: Performed today was reviewed showing right median neuropathy at the wrist consistent with entrapment in the carpal tunnel, very mild.  No cervical radiculopathy, brachial plexopathy, ulnar neuropathy in the bilateral upper extremities or median neuropathy at the wrist and left upper extremity.    Imaging: MRI report and images were personally reviewed and discussed with the patient.  A plastic model was utilized during the discussion.  MRI of the cervical spine shows normal disc heights throughout the cervical spine with small left central disc protrusion at C5-6 with mild central stenosis no foraminal stenosis.  No central foraminal stenosis throughout the remainder of the cervical spine.  No high-grade spinal cord compression at any level.    Review of Systems: *Pertinent positives: Numbness tingling weakness bilateral hands worse at night difficulty with  coordination.  Pertinent negatives:    No bowel or bladder incontinence.  No urinary retention.  No fevers, unintentional weight loss, balance changes, headaches, frequent falling, difficulty swallowing  .  All others reviewed are negative.           Past Medical History:   Diagnosis Date     Fibromyalgia        The following portions of the patient's history were reviewed and updated as appropriate: allergies, current medications, past family history, past medical history, past social history, past surgical history and problem list.           Objective:   Physical Exam:    /79   Pulse 77   There is no height or weight on file to calculate BMI.      General: Alert and oriented with normal affect. Attention, knowledge, memory, and language are intact. No acute distress.   Eyes: Sclerae are clear.  Respirations: Unlabored.  Skin: No rashes seen on the arms.    Gait:  Nonantalgic  Tenderness over the right parascapular region with hypertonic tissue textures.  Sensation is intact to light touch throughout the upper   extremities.  Reflexes are 2+ and symmetric in the biceps triceps and brachioradialis with Hoffmans equivocal right, mildly positive on the left .      Manual muscle testing reveals:  Right /Left out of 5  5/5 shoulder abductors  5/5 elbow flexors  5/5 elbow extensors  5/5 wrist extensors  5/5 interosseus  5/5 finger flexors

## 2022-08-23 ENCOUNTER — HOSPITAL ENCOUNTER (OUTPATIENT)
Dept: PHYSICAL THERAPY | Facility: CLINIC | Age: 52
Setting detail: THERAPIES SERIES
Discharge: HOME | End: 2022-08-23
Attending: PHYSICIAN ASSISTANT
Payer: COMMERCIAL

## 2022-08-23 PROCEDURE — 97140 MANUAL THERAPY 1/> REGIONS: CPT | Mod: GP

## 2022-08-23 PROCEDURE — 97110 THERAPEUTIC EXERCISES: CPT | Mod: GP

## 2022-09-07 ENCOUNTER — HOSPITAL ENCOUNTER (OUTPATIENT)
Dept: PHYSICAL THERAPY | Facility: CLINIC | Age: 52
Setting detail: THERAPIES SERIES
Discharge: HOME | End: 2022-09-07
Attending: PHYSICIAN ASSISTANT
Payer: COMMERCIAL

## 2022-09-07 PROCEDURE — 97140 MANUAL THERAPY 1/> REGIONS: CPT | Mod: GP

## 2022-09-07 PROCEDURE — 97110 THERAPEUTIC EXERCISES: CPT | Mod: GP

## 2022-09-14 RX ORDER — OMEPRAZOLE 10 MG/1
20 CAPSULE, DELAYED RELEASE ORAL DAILY
COMMUNITY
End: 2023-01-18

## 2022-09-16 ENCOUNTER — ANESTHESIA EVENT (OUTPATIENT)
Dept: GASTROENTEROLOGY | Facility: CLINIC | Age: 52
End: 2022-09-16
Payer: COMMERCIAL

## 2022-09-16 NOTE — ANESTHESIA PREPROCEDURE EVALUATION
Anesthesia Pre-Procedure Evaluation    Patient: Justyna Zhang   MRN: [id number] : 1970        Procedure : Procedure(s):  COLONOSCOPY          Past Medical History:   Diagnosis Date     Fibromyalgia       History reviewed. No pertinent surgical history.   Allergies   Allergen Reactions     Shellfish-Derived Products Anaphylaxis      Social History     Tobacco Use     Smoking status: Never Smoker     Smokeless tobacco: Never Used   Substance Use Topics     Alcohol use: Yes     Comment: monthly      Wt Readings from Last 1 Encounters:   22 100.2 kg (221 lb)        Anesthesia Evaluation   Pt has had prior anesthetic.         ROS/MED HX  ENT/Pulmonary:     (+) allergic rhinitis,     Neurologic:  - neg neurologic ROS     Cardiovascular:  - neg cardiovascular ROS     METS/Exercise Tolerance:     Hematologic:  - neg hematologic  ROS     Musculoskeletal:  - neg musculoskeletal ROS     GI/Hepatic:  - neg GI/hepatic ROS     Renal/Genitourinary:  - neg Renal ROS     Endo:  - neg endo ROS     Psychiatric/Substance Use:  - neg psychiatric ROS     Infectious Disease:  - neg infectious disease ROS     Malignancy:  - neg malignancy ROS     Other:  - neg other ROS          Physical Exam    Airway        Mallampati: I   TM distance: > 3 FB   Neck ROM: full   Mouth opening: > 3 cm    Respiratory Devices and Support         Dental  no notable dental history         Cardiovascular   cardiovascular exam normal          Pulmonary   pulmonary exam normal                OUTSIDE LABS:  CBC:   Lab Results   Component Value Date    WBC 5.6 2022    HGB 14.6 2022    HCT 44.1 2022     2022     BMP:   Lab Results   Component Value Date     2022     2020    POTASSIUM 3.9 2022    POTASSIUM 3.9 2020    CHLORIDE 107 2022    CHLORIDE 106 2020    CO2 26 2022    CO2 25 2020    BUN 17 2022    BUN 13 2020    CR 0.74 2022    CR 0.77  12/04/2020    GLC 97 06/06/2022    GLC 82 12/04/2020     COAGS: No results found for: PTT, INR, FIBR  POC: No results found for: BGM, HCG, HCGS  HEPATIC:   Lab Results   Component Value Date    ALT 29 12/05/2019    AST 20 12/05/2019     OTHER:   Lab Results   Component Value Date    CHARLES 8.8 06/06/2022    TSH 2.97 06/06/2022    CRP 9.9 (H) 06/06/2022       Anesthesia Plan    ASA Status:  3      Anesthesia Type: General.              Consents    Anesthesia Plan(s) and associated risks, benefits, and realistic alternatives discussed. Questions answered and patient/representative(s) expressed understanding.     - Discussed: Risks, Benefits and Alternatives for BOTH SEDATION and the PROCEDURE were discussed     - Discussed with:  Patient         Postoperative Care       PONV prophylaxis: Background Propofol Infusion     Comments:                EVER Rudd CRNA

## 2022-09-19 ENCOUNTER — HOSPITAL ENCOUNTER (OUTPATIENT)
Facility: CLINIC | Age: 52
Discharge: HOME | End: 2022-09-19
Attending: SURGERY | Admitting: SURGERY
Payer: COMMERCIAL

## 2022-09-19 ENCOUNTER — ANESTHESIA (OUTPATIENT)
Dept: GASTROENTEROLOGY | Facility: CLINIC | Age: 52
End: 2022-09-19
Payer: COMMERCIAL

## 2022-09-19 VITALS
HEART RATE: 80 BPM | OXYGEN SATURATION: 97 % | RESPIRATION RATE: 16 BRPM | SYSTOLIC BLOOD PRESSURE: 111 MMHG | BODY MASS INDEX: 37.73 KG/M2 | HEIGHT: 64 IN | WEIGHT: 221 LBS | TEMPERATURE: 98.1 F | DIASTOLIC BLOOD PRESSURE: 82 MMHG

## 2022-09-19 LAB — COLONOSCOPY: NORMAL

## 2022-09-19 PROCEDURE — 370N000017 HC ANESTHESIA TECHNICAL FEE, PER MIN: Performed by: SURGERY

## 2022-09-19 PROCEDURE — 258N000003 HC RX IP 258 OP 636: Performed by: NURSE ANESTHETIST, CERTIFIED REGISTERED

## 2022-09-19 PROCEDURE — 250N000011 HC RX IP 250 OP 636: Performed by: NURSE ANESTHETIST, CERTIFIED REGISTERED

## 2022-09-19 PROCEDURE — 250N000009 HC RX 250: Performed by: NURSE ANESTHETIST, CERTIFIED REGISTERED

## 2022-09-19 PROCEDURE — G0121 COLON CA SCRN NOT HI RSK IND: HCPCS | Performed by: SURGERY

## 2022-09-19 PROCEDURE — 45378 DIAGNOSTIC COLONOSCOPY: CPT | Performed by: SURGERY

## 2022-09-19 RX ORDER — ONDANSETRON 2 MG/ML
4 INJECTION INTRAMUSCULAR; INTRAVENOUS EVERY 30 MIN PRN
Status: DISCONTINUED | OUTPATIENT
Start: 2022-09-19 | End: 2022-09-19 | Stop reason: HOSPADM

## 2022-09-19 RX ORDER — SODIUM CHLORIDE, SODIUM LACTATE, POTASSIUM CHLORIDE, CALCIUM CHLORIDE 600; 310; 30; 20 MG/100ML; MG/100ML; MG/100ML; MG/100ML
INJECTION, SOLUTION INTRAVENOUS CONTINUOUS
Status: DISCONTINUED | OUTPATIENT
Start: 2022-09-19 | End: 2022-09-19 | Stop reason: HOSPADM

## 2022-09-19 RX ORDER — PROPOFOL 10 MG/ML
INJECTION, EMULSION INTRAVENOUS PRN
Status: DISCONTINUED | OUTPATIENT
Start: 2022-09-19 | End: 2022-09-19

## 2022-09-19 RX ORDER — LIDOCAINE 40 MG/G
CREAM TOPICAL
Status: DISCONTINUED | OUTPATIENT
Start: 2022-09-19 | End: 2022-09-19 | Stop reason: HOSPADM

## 2022-09-19 RX ORDER — ONDANSETRON 4 MG/1
4 TABLET, ORALLY DISINTEGRATING ORAL EVERY 30 MIN PRN
Status: DISCONTINUED | OUTPATIENT
Start: 2022-09-19 | End: 2022-09-19 | Stop reason: HOSPADM

## 2022-09-19 RX ORDER — ALBUTEROL SULFATE 0.83 MG/ML
2.5 SOLUTION RESPIRATORY (INHALATION) EVERY 4 HOURS PRN
Status: DISCONTINUED | OUTPATIENT
Start: 2022-09-19 | End: 2022-09-19 | Stop reason: HOSPADM

## 2022-09-19 RX ADMIN — PROPOFOL 50 MG: 10 INJECTION, EMULSION INTRAVENOUS at 08:14

## 2022-09-19 RX ADMIN — PROPOFOL 100 MG: 10 INJECTION, EMULSION INTRAVENOUS at 08:12

## 2022-09-19 RX ADMIN — PROPOFOL 100 MG: 10 INJECTION, EMULSION INTRAVENOUS at 08:10

## 2022-09-19 RX ADMIN — LIDOCAINE HYDROCHLORIDE 0.1 ML: 10 INJECTION, SOLUTION EPIDURAL; INFILTRATION; INTRACAUDAL; PERINEURAL at 07:58

## 2022-09-19 RX ADMIN — PROPOFOL 100 MG: 10 INJECTION, EMULSION INTRAVENOUS at 08:08

## 2022-09-19 RX ADMIN — SODIUM CHLORIDE, POTASSIUM CHLORIDE, SODIUM LACTATE AND CALCIUM CHLORIDE: 600; 310; 30; 20 INJECTION, SOLUTION INTRAVENOUS at 07:58

## 2022-09-19 ASSESSMENT — ACTIVITIES OF DAILY LIVING (ADL): ADLS_ACUITY_SCORE: 35

## 2022-09-19 NOTE — ANESTHESIA CARE TRANSFER NOTE
Patient: Justyna Zhang    Procedure: Procedure(s):  COLONOSCOPY       Diagnosis: Screen for colon cancer [Z12.11]  Diagnosis Additional Information: No value filed.    Anesthesia Type:   General     Note:    Oropharynx: oropharynx clear of all foreign objects and spontaneously breathing  Level of Consciousness: awake  Oxygen Supplementation: room air    Independent Airway: airway patency satisfactory and stable  Dentition: dentition unchanged  Vital Signs Stable: post-procedure vital signs reviewed and stable  Report to RN Given: handoff report given  Patient transferred to: Phase II    Handoff Report: Identifed the Patient, Identified the Reponsible Provider, Reviewed the pertinent medical history, Discussed the surgical course, Reviewed Intra-OP anesthesia mangement and issues during anesthesia, Set expectations for post-procedure period and Allowed opportunity for questions and acknowledgement of understanding      Vitals:  Vitals Value Taken Time   BP     Temp     Pulse     Resp     SpO2         Electronically Signed By: EVER Guillory CRNA  September 19, 2022  8:27 AM

## 2022-09-19 NOTE — ANESTHESIA POSTPROCEDURE EVALUATION
Patient: Justyna Zhang    Procedure: Procedure(s):  COLONOSCOPY       Anesthesia Type:  General    Note:  Disposition: Outpatient   Postop Pain Control: Uneventful            Sign Out: Well controlled pain   PONV: No   Neuro/Psych: Uneventful            Sign Out: Acceptable/Baseline neuro status   Airway/Respiratory: Uneventful            Sign Out: Acceptable/Baseline resp. status   CV/Hemodynamics: Uneventful            Sign Out: Acceptable CV status; No obvious hypovolemia; No obvious fluid overload   Other NRE: NONE   DID A NON-ROUTINE EVENT OCCUR? No           Last vitals:  Vitals Value Taken Time   /78 09/19/22 0845   Temp     Pulse 83 09/19/22 0845   Resp 16 09/19/22 0830   SpO2 99 % 09/19/22 0850   Vitals shown include unvalidated device data.    Electronically Signed By: EVER Guillory CRNA  September 19, 2022  8:51 AM

## 2022-09-19 NOTE — H&P
[facility]    Pre-Endoscopy History and Physical     Justyna Zhang MRN# [id number]   YOB: 1970 Age: 52 year old     Date of Procedure: 9/19/2022  Primary care provider: Judy David  Type of Endoscopy: Colonoscopy with possible biopsy, possible polypectomy  Reason for Procedure: screening  Type of Anesthesia Anticipated: MAC    HPI:    Justyna is a 52 year old female who will be undergoing the above procedure.  1st colonoscopy; no blood thinner; no famhx of colon cancer    A history and physical has been performed. The patient's medications and allergies have been reviewed. The risks and benefits of the procedure and the sedation options and risks were discussed with the patient.  All questions were answered and informed consent was obtained.      She denies a personal or family history of anesthesia complications or bleeding disorders.     Patient Active Problem List   Diagnosis     Chronic rhinitis     Tinea pedis     Fibromyalgia     Environmental allergies        Past Medical History:   Diagnosis Date     Fibromyalgia         History reviewed. No pertinent surgical history.    Social History     Tobacco Use     Smoking status: Never Smoker     Smokeless tobacco: Never Used   Substance Use Topics     Alcohol use: Yes     Comment: monthly       Family History   Adopted: Yes   Problem Relation Age of Onset     Diabetes Mother      Unknown/Adopted Other        Prior to Admission medications    Medication Sig Start Date End Date Taking? Authorizing Provider   furosemide (LASIX) 20 MG tablet Take 1 tablet (20 mg) by mouth daily as needed (edema) 6/6/22  Yes Judy David PA-C   gabapentin (NEURONTIN) 100 MG capsule 100mg at bedtime x 3 days, then may increase to 200mg x 3 days, then may increase to 300mg at bedtime 7/14/22  Yes Trav Townsend DO   levocetirizine (XYZAL ALLERGY 24HR) 5 MG tablet Take 1 tablet (5 mg) by mouth every evening 12/4/20  Yes Frankie  "MELA Kim   omeprazole (PRILOSEC) 10 MG DR capsule Take 20 mg by mouth daily   Yes Reported, Patient   tiZANidine (ZANAFLEX) 4 MG tablet Take 1 tablet (4 mg) by mouth nightly as needed for muscle spasms 6/6/22  Yes Judy David PA-C   albuterol (PROAIR HFA/PROVENTIL HFA/VENTOLIN HFA) 108 (90 Base) MCG/ACT inhaler Inhale 2 puffs into the lungs every 6 hours as needed for shortness of breath / dyspnea or wheezing  Patient not taking: No sig reported 12/4/20   Judy David PA-C   EPINEPHrine (ANY BX GENERIC EQUIV) 0.3 MG/0.3ML injection 2-pack Inject 0.3 mLs (0.3 mg) into the muscle as needed for anaphylaxis  Patient not taking: No sig reported 12/4/20   Judy David PA-C       Allergies   Allergen Reactions     Shellfish-Derived Products Anaphylaxis        REVIEW OF SYSTEMS:   5 point ROS negative except as noted above in HPI, including Gen., Resp., CV, GI &  system review.    PHYSICAL EXAM:   /85 (BP Location: Right arm)   Pulse 85   Temp 98.1  F (36.7  C) (Oral)   Ht 1.613 m (5' 3.5\")   Wt 100.2 kg (221 lb)   SpO2 97%   BMI 38.53 kg/m   Estimated body mass index is 38.53 kg/m  as calculated from the following:    Height as of this encounter: 1.613 m (5' 3.5\").    Weight as of this encounter: 100.2 kg (221 lb).   Constitutional: Awake, alert, no acute distress.  Eyes: No scleral icterus.  Conjunctiva are without injection.  ENMT: Mucous membranes moist, dentition and gums are intact.   Neck: Soft, supple, trachea midline.    Endocrine: n/a   Lymphatic: There is no cervical, submandibularadenopathy.  Respiratory: normal effortgs   Cardiovascular: S1, S2  Abdomen: Non-distended, non-tender,  No masses,  Musculoskeletal: No spinal or CVA tenderness. Full range of motion in the upper and lower extremities.    Skin: No skin rashes or lesions to inspection.  No petechia.    Neurologic: alerted and oriented 3x  Psychiatric: The patient's affect is not blunted and mood is " appropriate.  DIAGNOSTICS:    Not indicated    IMPRESSION   ASA Class 2 - Mild systemic disease    PLAN:   Plan for Colonoscopy with possible biopsy, possible polypectomy. We discussed the risks, benefits and alternatives and the patient wished to proceed.  Patient is cleared for the above procedure.    The above has been forwarded to the consulting provider.    [facility], [facility]

## 2022-09-19 NOTE — OR NURSING
Patient having mild discomfort in RLQ, cramping. Got up to bathroom and said it was better but not totally gone.  She also stated she normally has discomfort in her lower abdomen.  I reminded patient of signs and symptoms of perforation and to call if any concerns or symtoms of complications after discharge.

## 2022-11-07 ENCOUNTER — VIRTUAL VISIT (OUTPATIENT)
Dept: FAMILY MEDICINE | Facility: CLINIC | Age: 52
End: 2022-11-07
Payer: COMMERCIAL

## 2022-11-07 DIAGNOSIS — U07.1 INFECTION DUE TO 2019 NOVEL CORONAVIRUS: Primary | ICD-10-CM

## 2022-11-07 DIAGNOSIS — U07.1 INFECTION DUE TO 2019 NOVEL CORONAVIRUS: ICD-10-CM

## 2022-11-07 PROCEDURE — 99213 OFFICE O/P EST LOW 20 MIN: CPT | Mod: CS | Performed by: PHYSICIAN ASSISTANT

## 2022-11-07 NOTE — PROGRESS NOTES
Justyna is a 52 year old who is being evaluated via a billable video visit.      How would you like to obtain your AVS? MyChart  If the video visit is dropped, the invitation should be resent by: Text to cell phone: [phone]  Will anyone else be joining your video visit? No          Assessment & Plan     Infection due to 2019 novel coronavirus  Clinical presentation and labs are consistent with a COVID-19 infection.  Outpatient medication options were discussed with the patient.  We mutually agreed to proceed with Plaxlovid.  No interactions noted.  Patient's renal function is normal.  - nirmatrelvir and ritonavir (PAXLOVID) therapy pack; Take 3 tablets by mouth 2 times daily for 5 days (Take 2 Nirmatrelvir tablets and 1 Ritonavir tablet twice daily for 5 days)    Review of prior external note(s) from - previous routine PCP and speciality notes  15 minutes spent on the date of the encounter doing chart review, history and exam, documentation and further activities per the note       Patient Instructions   Encouraged mucinex/guafenisin, warm salt water gargles, chloraseptic spray, soothers/lozenges, sinus rinses (neilmed), flonase (2 sprays per nostril daily x 2 weeks), vitamin c, fluids and rest.  May alternate tylenol and NSAIDS (ibuprofen, advil, aleve type products) every 4-6 hours for the next few days as needed.    Return to clinic with any worsening or changes in symptoms.    Clinical presentation and labs are consistent with a COVID-19 infection.  Outpatient medication options were discussed with the patient.  We mutually agreed to proceed with Plaxlovid.  No interactions noted.  Patient's renal function is normal.        FACT SHEET FOR PATIENTS, PARENTS, AND CAREGIVERS  EMERGENCY USE AUTHORIZATION (EUA) OF PAXLOVID  FOR CORONAVIRUS DISEASE 2019 (COVID-19)  You are being given this Fact Sheet because your healthcare provider believes it is necessary to provide you with PAXLOVID for the treatment of  mild-to-moderate coronavirus disease (COVID-19) caused by the SARS-CoV-2 virus. This Fact Sheet contains information to help you understand the risks and benefits of taking the PAXLOVID you have received or may receive.    The U.S. Food and Drug Administration (FDA) has issued an Emergency Use Authorization (EUA) to make PAXLOVID available during the COVID-19 pandemic (for more details about an EUA please see  What is an Emergency Use Authorization?     at the end of this document). PAXLOVID is not an FDA-approved medicine in the [place]. Read this Fact Sheet for information about PAXLOVID. Talk to your healthcare provider about your options or if you have any questions. It is your choice to take PAXLOVID.    What is COVID-19?  COVID-19 is caused by a virus called a coronavirus. You can get COVID-19 through close contact with another person who has the virus. COVID-19 illnesses have ranged from very mild-to-severe, including illness resulting in death. While information so far suggests that most COVID-19 illness is mild, serious  illness can happen and may cause some of your other medical conditions to become worse. Older people and people of all ages with severe, long lasting (chronic) medical conditions like heart disease, lung disease, and diabetes, for example seem to be at higher risk of being hospitalized for COVID-19.    What is PAXLOVID?  PAXLOVID is an investigational medicine used to treat mild-to-moderate COVID-19 in adults and children [12 years of age and older weighing at least 88 pounds (40 kg)] with positive results of direct SARS-CoV-2 viral testing, and who are at high risk for progression to severe COVID-19, including hospitalization or death. PAXLOVID is investigational because it is still being studied. There is limited information about the  safety and effectiveness of using PAXLOVID to treat people with mild-to-moderate COVID-19.    The FDA has authorized the emergency use of PAXLOVID  for the treatment of mild-tomoderate COVID-19 in adults and children [12 years of age and older weighing at least 88 pounds (40 kg)] with a positive test for the virus that causes COVID-19, and who are at high risk for progression to severe COVID-19, including hospitalization or death, under an EUA.    What should I tell my healthcare provider before I take PAXLOVID?  Tell your healthcare provider if you:    Have any allergies    Have liver or kidney disease    Are pregnant or plan to become pregnant    Are breastfeeding a child    Have any serious illnesses    Tell your healthcare provider about all the medicines you take, including prescription and over-the-counter medicines, vitamins, and herbal supplements. Some medicines may interact with PAXLOVID and may cause serious side effects. Keep a list of your medicines to show your healthcare provider and pharmacist when you get a new medicine.    You can ask your healthcare provider or pharmacist for a list of medicines that interact with PAXLOVID. Do not start taking a new medicine without telling your healthcare provider. Your healthcare provider can tell you if it is safe to take PAXLOVID with other medicines.    Tell your healthcare provider if you are taking combined hormonal contraceptive. PAXLOVID may affect how your birth control pills work. Females who are able to become pregnant should use another effective alternative form of contraception or an additional barrier method of contraception. Talk to your healthcare provider if you have any questions about contraceptive methods that might be right for you.    How do I take PAXLOVID?    PAXLOVID consists of 2 medicines: nirmatrelvir and ritonavir.   o Take 2 pink tablets of nirmatrelvir with 1 white tablet of ritonavir by mouth  2 times each day (in the morning and in the evening) for 5 days. For each  dose, take all 3 tablets at the same time.  o If you have kidney disease, talk to your healthcare provider.  You  may need a different dose.    Swallow the tablets whole. Do not chew, break, or crush the tablets.    Take PAXLOVID with or without food.    Do not stop taking PAXLOVID without talking to your healthcare provider, even if  you feel better.      If you miss a dose of PAXLOVID within 8 hours of the time it is usually taken,  take it as soon as you remember. If you miss a dose by more than 8 hours, skip  the missed dose and take the next dose at your regular time. Do not take  2 doses of PAXLOVID at the same time.    If you take too much PAXLOVID, call your healthcare provider or go to the  nearest hospital emergency room right away.    If you are taking a ritonavir- or cobicistat-containing medicine to treat hepatitis C  or Human Immunodeficiency Virus (HIV), you should continue to take your  medicine as prescribed by your healthcare provider.        Talk to your healthcare provider if you do not feel better or if you feel worse after  5 days.  Who should generally not take PAXLOVID?  Do not take PAXLOVID if:    You are allergic to nirmatrelvir, ritonavir, or any of the ingredients in PAXLOVID.    You are taking any of the following medicines:  o Alfuzosin  o Pethidine, propoxyphene  o Ranolazine  o Amiodarone, dronedarone, flecainide, propafenone, quinidine  o Colchicine  o Lurasidone, pimozide, clozapine  o Dihydroergotamine, ergotamine, methylergonovine  o Lovastatin, simvastatin  o Sildenafil (Revatio ) for pulmonary arterial hypertension (PAH)  o Triazolam, oral midazolam  o Apalutamide  o Carbamazepine, phenobarbital, phenytoin  o Rifampin  o Davie s Wort (hypericum perforatum)  Taking PAXLOVID with these medicines may cause serious or life-threatening side  effects or affect how PAXLOVID works.  These are not the only medicines that may cause serious side effects if taken with  PAXLOVID. PAXLOVID may increase or decrease the levels of multiple other  medicines. It is very important to tell your  healthcare provider about all of the medicines  you are taking because additional laboratory tests or changes in the dose of your other  medicines may be necessary while you are taking PAXLOVID. Your healthcare provider  may also tell you about specific symptoms to watch out for that may indicate that you  need to stop or decrease the dose of some of your other medicines.    What are the important possible side effects of PAXLOVID?  Possible side effects of PAXLOVID are:    Allergic Reactions. Allergic reactions can happen in people taking  PAXLOVID, even after only 1 dose. Stop taking PAXLOVID and call your  healthcare provider right away if you get any of the following symptoms of an  allergic reaction:  o hives  o trouble swallowing or breathing  o swelling of the mouth, lips, or face  o throat tightness  o hoarseness   o skin rash    Liver Problems. Tell your healthcare provider right away if you have any of  these signs and symptoms of liver problems: loss of appetite, yellowing of your  skin and the whites of eyes (jaundice), dark-colored urine, pale colored stools  and itchy skin, stomach area (abdominal) pain.    Resistance to HIV Medicines. If you have untreated HIV infection, PAXLOVID  may lead to some HIV medicines not working as well in the future.    Other possible side effects include:  o altered sense of taste  o diarrhea  o high blood pressure  o muscle aches  These are not all the possible side effects of PAXLOVID. Not many people have taken  PAXLOVID. Serious and unexpected side effects may happen. PAXLOVID is still being  studied, so it is possible that all of the risks are not known at this time.    What other treatment choices are there?  Veklury (remdesivir) is FDA-approved for the treatment of mild-to-moderate COVID-19  in certain adults and children. Talk with your doctor to see if Veklury is appropriate for  you.  Like PAXLOVID, FDA may also allow for the emergency use of other medicines to  treat  people with COVID-19. Go to https://www.fda.gov/emergency-preparedness-andresponse/mcm-legal-regulatory-and-policy-framework/emergency-use-authorization for information on the emergency use of other medicines that are authorized by FDA to treat people with COVID-19. Your healthcare provider may talk with you about clinical  trials for which you may be eligible.  It is your choice to be treated or not to be treated with PAXLOVID. Should you decide  not to receive it or for your child not to receive it, it will not change your standard  medical care.  What if I am pregnant or breastfeeding?  There is no experience treating pregnant women or breastfeeding mothers with  PAXLOVID. For a mother and unborn baby, the benefit of taking PAXLOVID may be  greater than the risk from the treatment. If you are pregnant, discuss your options and  specific situation with your healthcare provider.  It is recommended that you use effective barrier contraception or do not have sexual  activity while taking PAXLOVID. If you are breastfeeding, discuss your options and specific situation with your healthcare provider.     How do I report side effects with PAXLOVID?  Contact your healthcare provider if you have any side effects that bother you or do not  go away.  Report side effects to FDA MedWatch at www.fda.gov/medwatch or call 7-634-RIM7787 or you can report side effects to [employer] at the contact information provided  below.  Website Fax number Telephone number  [facility] [phone]    How should I store PAXLOVID?  Store PAXLOVID tablets at room temperature, between 68?F to 77?F (20?C to 25?C).  How can I learn more about COVID-19?    Ask your healthcare provider.    Visit https://www.cdc.gov/COVID19.    Contact your local or state public health department.    What is an Emergency Use Authorization (EUA)?  The [place] FDA has made PAXLOVID available under an emergency  access  mechanism called an Emergency Use Authorization (EUA). The EUA is supported by a  [place] of Health and Human Service ([facility]) declaration that circumstances exist to  justify the emergency use of drugs and biological products during the COVID-19  pandemic.    PAXLOVID for the treatment of mild-to-moderate COVID-19 in adults and children  [12 years of age and older weighing at least 88 pounds (40 kg)] with positive results of  direct SARS-CoV-2 viral testing, and who are at high risk for progression to severe  COVID-19, including hospitalization or death, has not undergone the same type of  review as an FDA-approved product. In issuing an EUA under the COVID-19 public  health emergency, the FDA has determined, among other things, that based on the  total amount of scientific evidence available including data from adequate and  well-controlled clinical trials, if available, it is reasonable to believe that the product may  be effective for diagnosing, treating, or preventing COVID-19, or a serious or  life-threatening disease or condition caused by COVID-19; that the known and potential  benefits of the product, when used to diagnose, treat, or prevent such disease or  condition, outweigh the known and potential risks of such product; and that there are no  adequate, approved, and available alternatives.    All of these criteria must be met to allow for the product to be used in the treatment of  patients during the COVID-19 pandemic. The EUA for PAXLOVID is in effect for the  duration of the COVID-19 declaration justifying emergency use of this product, unless  terminated or revoked (after which the products may no longer be used under the  EUA).        Additional Information  For general questions, visit the website or call the telephone number provided below.  Website Telephone number  [employer]  [phone]  (0-767-D05-EXDH)  You can also go to [email or web address] or call [phone]  "for more information.    Discharge Instructions for COVID-19 Patients  You have--or may have--COVID-19. Please follow the instructions listed below.   If you have a weakened immune system, discuss with your doctor any other actions you need to take.  How can I protect others?  If you have symptoms (fever, cough, body aches or trouble breathing):  1. Stay home and away from others (self-isolate) until:  ? Your other symptoms have resolved (gotten better). And   ? You've had no fever--and no medicine that reduces fever--for 1 full day (24 hours). And   ? At least 10 days have passed since your symptoms started. (You may need to wait 20 days. Follow the advice of your care team.)  If you don't show symptoms, but testing showed that you have COVID-19:    Stay home and away from others (self-isolate) until at least 10 days have passed since the date of your first positive COVID-19 test.  During this time  1. Stay in your own room, even for meals. Use your own bathroom if you can.  2. Stay away from others in your home. No hugging, kissing or shaking hands. No visitors.  3. Don't go to work, school or anywhere else.  4. Clean \"high touch\" surfaces often (doorknobs, counters, handles). Use household cleaning spray or wipes.  5. You'll find a full list of  on the EPA website: www.epa.gov/pesticide-registration/list-n-disinfectants-use-against-sars-cov-2.  6. Cover your mouth and nose with a mask or other face covering to avoid spreading germs.  7. Wash your hands and face often. Use soap and water.  8. Caregivers in these groups are at risk for severe illness due to COVID-19:  1. People 65 years and older  2. People who live in a nursing home or long-term care facility  3. People with chronic disease (lung, heart, cancer, diabetes, kidney, liver, immunologic)  4. People who have a weakened immune system, including those who:    Are in cancer treatment    Take medicine that weakens the immune system, such as " corticosteroids    Had a bone marrow or organ transplant    Have an immune deficiency    Have poorly controlled HIV or AIDS    Are obese (body mass index of 40 or higher)    Smoke regularly  9. Caregivers should wear gloves while washing dishes, handling laundry and cleaning bedrooms and bathrooms.  10. Use caution when washing and drying laundry: Don't shake dirty laundry and use the warmest water setting that you can.  11. For more tips on managing your health at home, go to www.cdc.gov/coronavirus/2019-ncov/downloads/10Things.pdf.  How can I take care of myself at home?  1. Get lots of rest. Drink extra fluids (unless a doctor has told you not to).  2. Take Tylenol (acetaminophen) for fever or pain. If you have liver or kidney problems, ask your family doctor if it's okay to take Tylenol.   Adults can take either:   ? 650 mg (two 325 mg pills) every 4 to 6 hours, or   ? 1,000 mg (two 500 mg pills) every 8 hours as needed.  ? Note: Don't take more than 3,000 mg in one day. Acetaminophen is found in many medicines (both prescribed and over-the-counter medicines). Read all labels to be sure you don't take too much.   For children, check the Tylenol bottle for the right dose. The dose is based on the child's age or weight.  3. If you have other health problems (like cancer, heart failure, an organ transplant or severe kidney disease): Call your specialty clinic if you don't feel better in the next 2 days.  4. Know when to call 911. Emergency warning signs include:  ? Trouble breathing or shortness of breath  ? Pain or pressure in the chest that doesn't go away  ? Feeling confused like you haven't felt before, or not being able to wake up  ? Bluish-colored lips or face  5. Your doctor may have prescribed a blood thinner medicine. Follow their instructions.  Where can I get more information?  1.  [facility] - About COVID-19:   [email or web address]  2. [facility] - What to Do If You're Sick:  "www.cdc.gov/coronavirus/2019-ncov/about/steps-when-sick.html  3. CDC - Ending Home Isolation: www.cdc.gov/coronavirus/2019-ncov/hcp/disposition-in-home-patients.html  4. CDC - Caring for Someone: www.cdc.gov/coronavirus/2019-ncov/if-you-are-sick/care-for-someone.html  5. [facility] - Interim Guidance for Hospital Discharge to Home: www.health.[facility].[place]./diseases/coronavirus/hcp/hospdischarge.pdf  6. Below are the COVID-19 hotlines at the [place] Department of Health ([facility]). Interpreters are available.  ? For health questions: Call 327-778-3585 or [phone] (7 a.m. to 7 p.m.)  ? For questions about schools and childcare: Call 018-320-7470 or 1-543.821.3092 (7 a.m. to 7 p.m.)    For informational purposes only. Not to replace the advice of your health care provider. Clinically reviewed by Dr. Ian Silveira.   Copyright   2020 [facility]. All rights reserved. [employer] [id number] - REV 01/05/21.    Did you know?      You can schedule a video visit for follow-up appointments as well as future appointments for certain conditions.  Please see the below link.     Video Visits ([email or web address])     If you have not already done so,  I encourage you to sign up for [employer] ([email or web address]).  This will allow you to review your results, securely communicate with a provider, and schedule virtual visits as well.    COVID-19 positive patient.  Encounter for consideration of medication intervention. Patient does qualify for a prescription. Full discussion with patient including medication options, risks and benefits. Potential drug interactions reviewed with patient.     Treatment Planned Paxlovid, Rx sent to [place] pharmacy    Temporary change to home medications:  None     Estimated body mass index is 38.53 kg/m  as calculated from the following:    Height as of 9/19/22: 1.613 m (5' 3.5\").    Weight as of 9/19/22: 100.2 kg (221 lb).  GFR Estimate   Date Value Ref Range Status   06/06/2022 >90 " >60 mL/min/1.73m2 Final     Comment:     Effective December 21, 2021 eGFRcr in adults is calculated using the 2021 CKD-EPI creatinine equation which includes age and gender (Deb et al., [place], DOI: 10.1056/MRCFrl3023244)   12/04/2020 >90 >60 mL/min/[1.73_m2] Final     Comment:     Non  GFR Calc  Starting 12/18/2018, serum creatinine based estimated GFR (eGFR) will be   calculated using the Chronic Kidney Disease Epidemiology Collaboration   (CKD-EPI) equation.       No results found for: [id number]    Return in about 4 weeks (around 12/5/2022) for Follow up, with PCP, or sooner with worsening symptoms.    MELA Mandujano [facility] is a 52 year old presenting for the following health issues:  Covid Concern      HPI       COVID-19 Symptom Review  How many days ago did these symptoms start? 3 days    Are any of the following symptoms significant for you?    New or worsening difficulty breathing? No    Worsening cough? Yes, it's a dry cough - mild    Fever or chills? Yes, I felt feverish or had chills.    Headache: YES    Sore throat: YES    Chest pain: YES    Diarrhea: No    Body aches? YES, even worse due to fibromyalgia    What treatments has patient tried? Decongestant - oral   Does patient live in a nursing home, group home, or shelter? No  Does patient have a way to get food/medications during quarantined? Yes, I have a friend or family member who can help me.          Patient has had three COVID vaccines.    Review of Systems   Constitutional, HEENT, cardiovascular, pulmonary, GI, , musculoskeletal, neuro, skin, endocrine and psych systems are negative, except as otherwise noted.      Objective    Vitals - Patient Reported  Weight (Patient Reported): 98.9 kg (218 lb)      Vitals:  No vitals were obtained today due to virtual visit.    Physical Exam   GENERAL: Healthy, alert and no distress  EYES: Eyes grossly normal to inspection.  No  discharge or erythema, or obvious scleral/conjunctival abnormalities.  RESP: No audible wheeze, cough, or visible cyanosis.  No visible retractions or increased work of breathing.    SKIN: Visible skin clear. No significant rash, abnormal pigmentation or lesions.  NEURO: Cranial nerves grossly intact.  Mentation and speech appropriate for age.  PSYCH: Mentation appears normal, affect normal/bright, judgement and insight intact, normal speech and appearance well-groomed.            Video-Visit Details    Video Start Time: 10:42 AM    Type of service:  Video Visit    Video End Time:11:02 AM    Originating Location (pt. Location): Home        Distant Location (provider location):  Off-site    Platform used for Video Visit: Optensity

## 2022-11-07 NOTE — PATIENT INSTRUCTIONS
Encouraged mucinex/guafenisin, warm salt water gargles, chloraseptic spray, soothers/lozenges, sinus rinses (neilmed), flonase (2 sprays per nostril daily x 2 weeks), vitamin c, fluids and rest.  May alternate tylenol and NSAIDS (ibuprofen, advil, aleve type products) every 4-6 hours for the next few days as needed.    Return to clinic with any worsening or changes in symptoms.    Clinical presentation and labs are consistent with a COVID-19 infection.  Outpatient medication options were discussed with the patient.  We mutually agreed to proceed with Plaxlovid.  No interactions noted.  Patient's renal function is normal.        FACT SHEET FOR PATIENTS, PARENTS, AND CAREGIVERS  EMERGENCY USE AUTHORIZATION (EUA) OF PAXLOVID  FOR CORONAVIRUS DISEASE 2019 (COVID-19)  You are being given this Fact Sheet because your healthcare provider believes it is necessary to provide you with PAXLOVID for the treatment of mild-to-moderate coronavirus disease (COVID-19) caused by the SARS-CoV-2 virus. This Fact Sheet contains information to help you understand the risks and benefits of taking the PAXLOVID you have received or may receive.    The U.S. Food and Drug Administration (FDA) has issued an Emergency Use Authorization (EUA) to make PAXLOVID available during the COVID-19 pandemic (for more details about an EUA please see  What is an Emergency Use Authorization?     at the end of this document). PAXLOVID is not an FDA-approved medicine in the [place]. Read this Fact Sheet for information about PAXLOVID. Talk to your healthcare provider about your options or if you have any questions. It is your choice to take PAXLOVID.    What is COVID-19?  COVID-19 is caused by a virus called a coronavirus. You can get COVID-19 through close contact with another person who has the virus. COVID-19 illnesses have ranged from very mild-to-severe, including illness resulting in death. While information so far suggests that most COVID-19  illness is mild, serious  illness can happen and may cause some of your other medical conditions to become worse. Older people and people of all ages with severe, long lasting (chronic) medical conditions like heart disease, lung disease, and diabetes, for example seem to be at higher risk of being hospitalized for COVID-19.    What is PAXLOVID?  PAXLOVID is an investigational medicine used to treat mild-to-moderate COVID-19 in adults and children [12 years of age and older weighing at least 88 pounds (40 kg)] with positive results of direct SARS-CoV-2 viral testing, and who are at high risk for progression to severe COVID-19, including hospitalization or death. PAXLOVID is investigational because it is still being studied. There is limited information about the  safety and effectiveness of using PAXLOVID to treat people with mild-to-moderate COVID-19.    The FDA has authorized the emergency use of PAXLOVID for the treatment of mild-tomoderate COVID-19 in adults and children [12 years of age and older weighing at least 88 pounds (40 kg)] with a positive test for the virus that causes COVID-19, and who are at high risk for progression to severe COVID-19, including hospitalization or death, under an EUA.    What should I tell my healthcare provider before I take PAXLOVID?  Tell your healthcare provider if you:  ? Have any allergies  ? Have liver or kidney disease  ? Are pregnant or plan to become pregnant  ? Are breastfeeding a child  ? Have any serious illnesses    Tell your healthcare provider about all the medicines you take, including prescription and over-the-counter medicines, vitamins, and herbal supplements. Some medicines may interact with PAXLOVID and may cause serious side effects. Keep a list of your medicines to show your healthcare provider and pharmacist when you get a new medicine.    You can ask your healthcare provider or pharmacist for a list of medicines that interact with PAXLOVID. Do not start  taking a new medicine without telling your healthcare provider. Your healthcare provider can tell you if it is safe to take PAXLOVID with other medicines.    Tell your healthcare provider if you are taking combined hormonal contraceptive. PAXLOVID may affect how your birth control pills work. Females who are able to become pregnant should use another effective alternative form of contraception or an additional barrier method of contraception. Talk to your healthcare provider if you have any questions about contraceptive methods that might be right for you.    How do I take PAXLOVID?  ? PAXLOVID consists of 2 medicines: nirmatrelvir and ritonavir.   o Take 2 pink tablets of nirmatrelvir with 1 white tablet of ritonavir by mouth  2 times each day (in the morning and in the evening) for 5 days. For each  dose, take all 3 tablets at the same time.  o If you have kidney disease, talk to your healthcare provider. You  may need a different dose.  ? Swallow the tablets whole. Do not chew, break, or crush the tablets.  ? Take PAXLOVID with or without food.  ? Do not stop taking PAXLOVID without talking to your healthcare provider, even if  you feel better.    ? If you miss a dose of PAXLOVID within 8 hours of the time it is usually taken,  take it as soon as you remember. If you miss a dose by more than 8 hours, skip  the missed dose and take the next dose at your regular time. Do not take  2 doses of PAXLOVID at the same time.  ? If you take too much PAXLOVID, call your healthcare provider or go to the  nearest hospital emergency room right away.  ? If you are taking a ritonavir- or cobicistat-containing medicine to treat hepatitis C  or Human Immunodeficiency Virus (HIV), you should continue to take your  medicine as prescribed by your healthcare provider.        Talk to your healthcare provider if you do not feel better or if you feel worse after  5 days.  Who should generally not take PAXLOVID?  Do not take PAXLOVID  if:  ? You are allergic to nirmatrelvir, ritonavir, or any of the ingredients in PAXLOVID.  ? You are taking any of the following medicines:  o Alfuzosin  o Pethidine, propoxyphene  o Ranolazine  o Amiodarone, dronedarone, flecainide, propafenone, quinidine  o Colchicine  o Lurasidone, pimozide, clozapine  o Dihydroergotamine, ergotamine, methylergonovine  o Lovastatin, simvastatin  o Sildenafil (Revatio ) for pulmonary arterial hypertension (PAH)  o Triazolam, oral midazolam  o Apalutamide  o Carbamazepine, phenobarbital, phenytoin  o Rifampin  o Chetek s Wort (hypericum perforatum)  Taking PAXLOVID with these medicines may cause serious or life-threatening side  effects or affect how PAXLOVID works.  These are not the only medicines that may cause serious side effects if taken with  PAXLOVID. PAXLOVID may increase or decrease the levels of multiple other  medicines. It is very important to tell your healthcare provider about all of the medicines  you are taking because additional laboratory tests or changes in the dose of your other  medicines may be necessary while you are taking PAXLOVID. Your healthcare provider  may also tell you about specific symptoms to watch out for that may indicate that you  need to stop or decrease the dose of some of your other medicines.    What are the important possible side effects of PAXLOVID?  Possible side effects of PAXLOVID are:  ? Allergic Reactions. Allergic reactions can happen in people taking  PAXLOVID, even after only 1 dose. Stop taking PAXLOVID and call your  healthcare provider right away if you get any of the following symptoms of an  allergic reaction:  o hives  o trouble swallowing or breathing  o swelling of the mouth, lips, or face  o throat tightness  o hoarseness   o skin rash  ? Liver Problems. Tell your healthcare provider right away if you have any of  these signs and symptoms of liver problems: loss of appetite, yellowing of your  skin and the whites of  eyes (jaundice), dark-colored urine, pale colored stools  and itchy skin, stomach area (abdominal) pain.  ? Resistance to HIV Medicines. If you have untreated HIV infection, PAXLOVID  may lead to some HIV medicines not working as well in the future.  ? Other possible side effects include:  o altered sense of taste  o diarrhea  o high blood pressure  o muscle aches  These are not all the possible side effects of PAXLOVID. Not many people have taken  PAXLOVID. Serious and unexpected side effects may happen. PAXLOVID is still being  studied, so it is possible that all of the risks are not known at this time.    What other treatment choices are there?  Veklury (remdesivir) is FDA-approved for the treatment of mild-to-moderate COVID-19  in certain adults and children. Talk with your doctor to see if Veklury is appropriate for  you.  Like PAXLOVID, FDA may also allow for the emergency use of other medicines to treat  people with COVID-19. Go to https://www.fda.gov/emergency-preparedness-andresponse/mcm-legal-regulatory-and-policy-framework/emergency-use-authorization for information on the emergency use of other medicines that are authorized by FDA to treat people with COVID-19. Your healthcare provider may talk with you about clinical  trials for which you may be eligible.  It is your choice to be treated or not to be treated with PAXLOVID. Should you decide  not to receive it or for your child not to receive it, it will not change your standard  medical care.  What if I am pregnant or breastfeeding?  There is no experience treating pregnant women or breastfeeding mothers with  PAXLOVID. For a mother and unborn baby, the benefit of taking PAXLOVID may be  greater than the risk from the treatment. If you are pregnant, discuss your options and  specific situation with your healthcare provider.  It is recommended that you use effective barrier contraception or do not have sexual  activity while taking PAXLOVID. If you are  breastfeeding, discuss your options and specific situation with your healthcare provider.     How do I report side effects with PAXLOVID?  Contact your healthcare provider if you have any side effects that bother you or do not  go away.  Report side effects to FDA MedWatch at www.fda.gov/medwatch or call 0-956-FUM3405 or you can report side effects to [employer] at the contact information provided  below.  Website Fax number Telephone number  [facility] [phone]    How should I store PAXLOVID?  Store PAXLOVID tablets at room temperature, between 68?F to 77?F (20?C to 25?C).  How can I learn more about COVID-19?  ? Ask your healthcare provider.  ? Visit https://www.cdc.gov/COVID19.  ? Contact your local or state public health department.    What is an Emergency Use Authorization (EUA)?  The [place] FDA has made PAXLOVID available under an emergency access  mechanism called an Emergency Use Authorization (EUA). The EUA is supported by a   of Health and Human Service (HHS) declaration that circumstances exist to  justify the emergency use of drugs and biological products during the COVID-19  pandemic.    PAXLOVID for the treatment of mild-to-moderate COVID-19 in adults and children  [12 years of age and older weighing at least 88 pounds (40 kg)] with positive results of  direct SARS-CoV-2 viral testing, and who are at high risk for progression to severe  COVID-19, including hospitalization or death, has not undergone the same type of  review as an FDA-approved product. In issuing an EUA under the COVID-19 public  health emergency, the FDA has determined, among other things, that based on the  total amount of scientific evidence available including data from adequate and  well-controlled clinical trials, if available, it is reasonable to believe that the product may  be effective for diagnosing, treating, or preventing COVID-19, or a serious or  life-threatening  disease or condition caused by COVID-19; that the known and potential  benefits of the product, when used to diagnose, treat, or prevent such disease or  condition, outweigh the known and potential risks of such product; and that there are no  adequate, approved, and available alternatives.    All of these criteria must be met to allow for the product to be used in the treatment of  patients during the COVID-19 pandemic. The EUA for PAXLOVID is in effect for the  duration of the COVID-19 declaration justifying emergency use of this product, unless  terminated or revoked (after which the products may no longer be used under the  EUA).        Additional Information  For general questions, visit the website or call the telephone number provided below.  Website Telephone number  [employer]  [phone]  ([phone])  You can also go to www."[employer] or call [phone] for more information.    Discharge Instructions for COVID-19 Patients  You have--or may have--COVID-19. Please follow the instructions listed below.   If you have a weakened immune system, discuss with your doctor any other actions you need to take.  How can I protect others?  If you have symptoms (fever, cough, body aches or trouble breathing):  Stay home and away from others (self-isolate) until:  Your other symptoms have resolved (gotten better). And   You've had no fever--and no medicine that reduces fever--for 1 full day (24 hours). And   At least 10 days have passed since your symptoms started. (You may need to wait 20 days. Follow the advice of your care team.)  If you don't show symptoms, but testing showed that you have COVID-19:  Stay home and away from others (self-isolate) until at least 10 days have passed since the date of your first positive COVID-19 test.  During this time  Stay in your own room, even for meals. Use your own bathroom if you can.  Stay away from others in your home. No hugging, kissing or  "shaking hands. No visitors.  Don't go to work, school or anywhere else.  Clean \"high touch\" surfaces often (doorknobs, counters, handles). Use household cleaning spray or wipes.  You'll find a full list of  on the EPA website: www.epa.gov/pesticide-registration/list-n-disinfectants-use-against-sars-cov-2.  Cover your mouth and nose with a mask or other face covering to avoid spreading germs.  Wash your hands and face often. Use soap and water.  Caregivers in these groups are at risk for severe illness due to COVID-19:  People 65 years and older  People who live in a nursing home or long-term care facility  People with chronic disease (lung, heart, cancer, diabetes, kidney, liver, immunologic)  People who have a weakened immune system, including those who:  Are in cancer treatment  Take medicine that weakens the immune system, such as corticosteroids  Had a bone marrow or organ transplant  Have an immune deficiency  Have poorly controlled HIV or AIDS  Are obese (body mass index of 40 or higher)  Smoke regularly  Caregivers should wear gloves while washing dishes, handling laundry and cleaning bedrooms and bathrooms.  Use caution when washing and drying laundry: Don't shake dirty laundry and use the warmest water setting that you can.  For more tips on managing your health at home, go to www.cdc.gov/coronavirus/2019-ncov/downloads/10Things.pdf.  How can I take care of myself at home?  Get lots of rest. Drink extra fluids (unless a doctor has told you not to).  Take Tylenol (acetaminophen) for fever or pain. If you have liver or kidney problems, ask your family doctor if it's okay to take Tylenol.   Adults can take either:   650 mg (two 325 mg pills) every 4 to 6 hours, or   1,000 mg (two 500 mg pills) every 8 hours as needed.  Note: Don't take more than 3,000 mg in one day. Acetaminophen is found in many medicines (both prescribed and over-the-counter medicines). Read all labels to be sure you don't take too " much.   For children, check the Tylenol bottle for the right dose. The dose is based on the child's age or weight.  If you have other health problems (like cancer, heart failure, an organ transplant or severe kidney disease): Call your specialty clinic if you don't feel better in the next 2 days.  Know when to call 911. Emergency warning signs include:  Trouble breathing or shortness of breath  Pain or pressure in the chest that doesn't go away  Feeling confused like you haven't felt before, or not being able to wake up  Bluish-colored lips or face  Your doctor may have prescribed a blood thinner medicine. Follow their instructions.  Where can I get more information?  [facility] - About COVID-19:   [email or web address]  CDC - What to Do If You're Sick: www.cdc.gov/coronavirus/2019-ncov/about/steps-when-sick.html  CDC - Ending Home Isolation: www.cdc.gov/coronavirus/2019-ncov/hcp/disposition-in-home-patients.html  CDC - Caring for Someone: www.cdc.gov/coronavirus/2019-ncov/if-you-are-sick/care-for-someone.html  [facility] - Interim Guidance for Hospital Discharge to Home: www.health.[facility].[place]/diseases/coronavirus/hcp/hospdischarge.pdf  Below are the COVID-19 hotlines at the [place] Department of Health ([facility]). Interpreters are available.  For health questions: Call 300-869-7627 or 1-739.150.3644 (7 a.m. to 7 p.m.)  For questions about schools and childcare: Call 489-762-9929 or [phone] (7 a.m. to 7 p.m.)    For informational purposes only. Not to replace the advice of your health care provider. Clinically reviewed by Dr. Ian Silveira.   Copyright   2020 [employer]. All rights reserved. [employer] [id number] - REV 01/05/21.    Did you know?      You can schedule a video visit for follow-up appointments as well as future appointments for certain conditions.  Please see the below link.     Video Visits ([email or web address])     If you have not already done so,  I encourage you to  sign up for [facility] ([email or web address]).  This will allow you to review your results, securely communicate with a provider, and schedule virtual visits as well.

## 2022-11-07 NOTE — TELEPHONE ENCOUNTER
Routing refill request to provider for review/approval because:  Drug not on the [facility] refill protocol   Needs to be sent to different pharmacy- [facility] does not have.  Ileana CHRISTENSEN RN

## 2022-11-19 ENCOUNTER — HEALTH MAINTENANCE LETTER (OUTPATIENT)
Age: 52
End: 2022-11-19

## 2022-12-19 ENCOUNTER — E-VISIT (OUTPATIENT)
Dept: URGENT CARE | Facility: CLINIC | Age: 52
End: 2022-12-19
Payer: COMMERCIAL

## 2022-12-19 DIAGNOSIS — R30.0 DIFFICULT OR PAINFUL URINATION: Primary | ICD-10-CM

## 2022-12-19 PROCEDURE — 99421 OL DIG E/M SVC 5-10 MIN: CPT | Performed by: PHYSICIAN ASSISTANT

## 2022-12-19 NOTE — PATIENT INSTRUCTIONS
Dear Justyna Zhang,     After reviewing your responses, I would like you to come in for a urine test to make sure we treat you correctly. This urine test is to evaluate you for a possible urinary tract infection, and should be scheduled for today or tomorrow. Schedule a Lab Only appointment here.     Lab appointments are not available at most locations on the weekends. If no Lab Only appointment is available, you should be seen in any of our convenient Walk-in or Urgent Care Centers, which can be found on our website here.     You will receive instructions with your results in [place] once they are available.     If your symptoms worsen, you develop pain in your back or stomach, develop fevers, or are not improving in 5 days, please contact your primary care provider for an appointment or visit a Walk-in or Urgent Care Center to be seen.     Thanks again for choosing us as your health care partner,     Geri Quesada PA-C

## 2023-01-18 ENCOUNTER — MYC REFILL (OUTPATIENT)
Dept: FAMILY MEDICINE | Facility: CLINIC | Age: 53
End: 2023-01-18
Payer: COMMERCIAL

## 2023-01-18 DIAGNOSIS — M79.7 FIBROMYALGIA: ICD-10-CM

## 2023-01-19 RX ORDER — OMEPRAZOLE 10 MG/1
20 CAPSULE, DELAYED RELEASE ORAL DAILY
Qty: 180 CAPSULE | Refills: 0 | Status: SHIPPED | OUTPATIENT
Start: 2023-01-19 | End: 2023-07-07

## 2023-01-19 NOTE — TELEPHONE ENCOUNTER
"Routing refill request to provider for review/approval because:  Drug not on the [facility] refill protocol   Review PPI useage          Requested Prescriptions   Pending Prescriptions Disp Refills     tiZANidine (ZANAFLEX) 4 MG tablet 90 tablet 0     Sig: Take 1 tablet (4 mg) by mouth nightly as needed for muscle spasms       There is no refill protocol information for this order        omeprazole (PRILOSEC) 10 MG DR capsule       Sig: Take 2 capsules (20 mg) by mouth daily       PPI Protocol Passed - 1/18/2023  6:18 PM        Passed - Not on Clopidogrel (unless Pantoprazole ordered)        Passed - No diagnosis of osteoporosis on record        Passed - Recent (12 mo) or future (30 days) visit within the authorizing provider's specialty     Patient has had an office visit with the authorizing provider or a provider within the authorizing providers department within the previous 12 mos or has a future within next 30 days. See \"Patient Info\" tab in inbasket, or \"Choose Columns\" in Meds & Orders section of the refill encounter.              Passed - Medication is active on med list        Passed - Patient is age 18 or older        Passed - No active pregnacy on record        Passed - No positive pregnancy test in past 12 months                 Wanda Olivier RN 01/19/23 1:46 PM  "

## 2023-01-26 DIAGNOSIS — Z91.09 MULTIPLE ENVIRONMENTAL ALLERGIES: ICD-10-CM

## 2023-01-30 RX ORDER — EPINEPHRINE 0.3 MG/.3ML
0.3 INJECTION SUBCUTANEOUS PRN
Qty: 1 EACH | Refills: 1 | Status: SHIPPED | OUTPATIENT
Start: 2023-01-30 | End: 2024-05-31

## 2023-01-30 NOTE — TELEPHONE ENCOUNTER
"Last Written Prescription Date: 12/4/2020  Last Fill Quantity: 0.3mg, # refills: 1  Last office visit provider: 6/06/22        Requested Prescriptions   Pending Prescriptions Disp Refills     EPINEPHrine (ANY BX GENERIC EQUIV) 0.3 MG/0.3ML injection 2-pack 1 each 1     Sig: Inject 0.3 mLs (0.3 mg) into the muscle as needed for anaphylaxis       Anaphylaxis Kits Protocol Passed - 1/26/2023 12:36 PM        Passed - Recent (12 mo) or future (30 days) visit witin the authorizing provider's specialty     Patient has had an office visit with the authorizing provider or a provider within the authorizing providers department within the previous 12 mos or has a future within next 30 days. See \"Patient Info\" tab in inbasket, or \"Choose Columns\" in Meds & Orders section of the refill encounter.              Passed - Patient is age 5 or older        Passed - Medication is active on med list                 Wanda Olivier RN 01/30/23 1:42 PM  "

## 2023-05-08 ENCOUNTER — PATIENT OUTREACH (OUTPATIENT)
Dept: CARE COORDINATION | Facility: CLINIC | Age: 53
End: 2023-05-08
Payer: COMMERCIAL

## 2023-06-06 ENCOUNTER — ANCILLARY PROCEDURE (OUTPATIENT)
Dept: MAMMOGRAPHY | Facility: CLINIC | Age: 53
End: 2023-06-06
Attending: PHYSICIAN ASSISTANT
Payer: COMMERCIAL

## 2023-06-06 DIAGNOSIS — Z12.31 VISIT FOR SCREENING MAMMOGRAM: ICD-10-CM

## 2023-06-06 PROCEDURE — 77067 SCR MAMMO BI INCL CAD: CPT | Mod: TC | Performed by: RADIOLOGY

## 2023-06-06 PROCEDURE — 77063 BREAST TOMOSYNTHESIS BI: CPT | Mod: TC | Performed by: RADIOLOGY

## 2023-07-05 ASSESSMENT — ENCOUNTER SYMPTOMS
HEMATURIA: 0
HEADACHES: 0
COUGH: 0
MYALGIAS: 0
DYSURIA: 0
DIARRHEA: 0
CONSTIPATION: 0
FEVER: 0
BREAST MASS: 0
FREQUENCY: 0
HEARTBURN: 0
JOINT SWELLING: 0
ABDOMINAL PAIN: 0
SORE THROAT: 0
CHILLS: 0
PARESTHESIAS: 0
DIZZINESS: 0
NERVOUS/ANXIOUS: 0
EYE PAIN: 0
ARTHRALGIAS: 0
HEMATOCHEZIA: 0
NAUSEA: 0
PALPITATIONS: 0

## 2023-07-07 ENCOUNTER — MYC MEDICAL ADVICE (OUTPATIENT)
Dept: FAMILY MEDICINE | Facility: CLINIC | Age: 53
End: 2023-07-07

## 2023-07-07 ENCOUNTER — OFFICE VISIT (OUTPATIENT)
Dept: FAMILY MEDICINE | Facility: CLINIC | Age: 53
End: 2023-07-07
Payer: COMMERCIAL

## 2023-07-07 VITALS
HEIGHT: 63 IN | TEMPERATURE: 97.1 F | BODY MASS INDEX: 41.52 KG/M2 | RESPIRATION RATE: 12 BRPM | HEART RATE: 74 BPM | OXYGEN SATURATION: 97 % | SYSTOLIC BLOOD PRESSURE: 122 MMHG | DIASTOLIC BLOOD PRESSURE: 88 MMHG | WEIGHT: 234.3 LBS

## 2023-07-07 DIAGNOSIS — E66.01 MORBID OBESITY (H): ICD-10-CM

## 2023-07-07 DIAGNOSIS — M79.89 LEG SWELLING: ICD-10-CM

## 2023-07-07 DIAGNOSIS — M79.7 FIBROMYALGIA: ICD-10-CM

## 2023-07-07 DIAGNOSIS — R06.83 SNORING: ICD-10-CM

## 2023-07-07 DIAGNOSIS — Z13.220 SCREENING FOR LIPOID DISORDERS: ICD-10-CM

## 2023-07-07 DIAGNOSIS — N95.1 MENOPAUSAL SYNDROME (HOT FLASHES): Primary | ICD-10-CM

## 2023-07-07 DIAGNOSIS — N95.1 MENOPAUSAL SYNDROME (HOT FLASHES): ICD-10-CM

## 2023-07-07 DIAGNOSIS — M25.50 MULTIPLE JOINT PAIN: ICD-10-CM

## 2023-07-07 DIAGNOSIS — N30.01 ACUTE CYSTITIS WITH HEMATURIA: ICD-10-CM

## 2023-07-07 DIAGNOSIS — Z13.1 ENCOUNTER FOR SCREENING EXAMINATION FOR IMPAIRED GLUCOSE REGULATION AND DIABETES MELLITUS: ICD-10-CM

## 2023-07-07 DIAGNOSIS — Z00.00 ROUTINE GENERAL MEDICAL EXAMINATION AT A HEALTH CARE FACILITY: Primary | ICD-10-CM

## 2023-07-07 DIAGNOSIS — K21.9 GASTROESOPHAGEAL REFLUX DISEASE WITHOUT ESOPHAGITIS: ICD-10-CM

## 2023-07-07 LAB
ALBUMIN SERPL BCG-MCNC: 4.5 G/DL (ref 3.5–5.2)
ALBUMIN UR-MCNC: NEGATIVE MG/DL
ALP SERPL-CCNC: 92 U/L (ref 35–104)
ALT SERPL W P-5'-P-CCNC: 33 U/L (ref 0–50)
ANION GAP SERPL CALCULATED.3IONS-SCNC: 10 MMOL/L (ref 7–15)
APPEARANCE UR: CLEAR
AST SERPL W P-5'-P-CCNC: 33 U/L (ref 0–45)
BACTERIA #/AREA URNS HPF: ABNORMAL /HPF
BILIRUB SERPL-MCNC: 0.5 MG/DL
BILIRUB UR QL STRIP: NEGATIVE
BUN SERPL-MCNC: 14.9 MG/DL (ref 6–20)
CALCIUM SERPL-MCNC: 9.9 MG/DL (ref 8.6–10)
CHLORIDE SERPL-SCNC: 104 MMOL/L (ref 98–107)
CHOLEST SERPL-MCNC: 170 MG/DL
COLOR UR AUTO: YELLOW
CREAT SERPL-MCNC: 0.73 MG/DL (ref 0.51–0.95)
DEPRECATED HCO3 PLAS-SCNC: 25 MMOL/L (ref 22–29)
GFR SERPL CREATININE-BSD FRML MDRD: >90 ML/MIN/1.73M2
GLUCOSE SERPL-MCNC: 95 MG/DL (ref 70–99)
GLUCOSE UR STRIP-MCNC: NEGATIVE MG/DL
HBA1C MFR BLD: 5.5 % (ref 0–5.6)
HDLC SERPL-MCNC: 50 MG/DL
HGB UR QL STRIP: NEGATIVE
KETONES UR STRIP-MCNC: NEGATIVE MG/DL
LDLC SERPL CALC-MCNC: 88 MG/DL
LEUKOCYTE ESTERASE UR QL STRIP: ABNORMAL
NITRATE UR QL: NEGATIVE
NONHDLC SERPL-MCNC: 120 MG/DL
PH UR STRIP: 5.5 [PH] (ref 5–7)
POTASSIUM SERPL-SCNC: 4.2 MMOL/L (ref 3.4–5.3)
PROT SERPL-MCNC: 7.7 G/DL (ref 6.4–8.3)
RBC #/AREA URNS AUTO: ABNORMAL /HPF
SODIUM SERPL-SCNC: 139 MMOL/L (ref 136–145)
SP GR UR STRIP: 1.01 (ref 1–1.03)
SQUAMOUS #/AREA URNS AUTO: ABNORMAL /LPF
TRIGL SERPL-MCNC: 162 MG/DL
TSH SERPL DL<=0.005 MIU/L-ACNC: 2.31 UIU/ML (ref 0.3–4.2)
UROBILINOGEN UR STRIP-ACNC: 0.2 E.U./DL
WBC #/AREA URNS AUTO: ABNORMAL /HPF

## 2023-07-07 PROCEDURE — 80061 LIPID PANEL: CPT | Performed by: PHYSICIAN ASSISTANT

## 2023-07-07 PROCEDURE — 81001 URINALYSIS AUTO W/SCOPE: CPT | Performed by: PHYSICIAN ASSISTANT

## 2023-07-07 PROCEDURE — 36415 COLL VENOUS BLD VENIPUNCTURE: CPT | Performed by: PHYSICIAN ASSISTANT

## 2023-07-07 PROCEDURE — 80053 COMPREHEN METABOLIC PANEL: CPT | Performed by: PHYSICIAN ASSISTANT

## 2023-07-07 PROCEDURE — 84443 ASSAY THYROID STIM HORMONE: CPT | Performed by: PHYSICIAN ASSISTANT

## 2023-07-07 PROCEDURE — 99213 OFFICE O/P EST LOW 20 MIN: CPT | Mod: 25 | Performed by: PHYSICIAN ASSISTANT

## 2023-07-07 PROCEDURE — 83036 HEMOGLOBIN GLYCOSYLATED A1C: CPT | Performed by: PHYSICIAN ASSISTANT

## 2023-07-07 PROCEDURE — 99396 PREV VISIT EST AGE 40-64: CPT | Performed by: PHYSICIAN ASSISTANT

## 2023-07-07 RX ORDER — FUROSEMIDE 20 MG
20 TABLET ORAL DAILY PRN
Qty: 90 TABLET | Refills: 1 | Status: SHIPPED | OUTPATIENT
Start: 2023-07-07 | End: 2024-05-31

## 2023-07-07 RX ORDER — OMEPRAZOLE 10 MG/1
10 CAPSULE, DELAYED RELEASE ORAL DAILY
Qty: 90 CAPSULE | Refills: 3 | Status: SHIPPED | OUTPATIENT
Start: 2023-07-07 | End: 2024-05-31

## 2023-07-07 RX ORDER — NORETHINDRONE ACETATE AND ETHINYL ESTRADIOL .5; 2.5 MG/1; UG/1
1 TABLET ORAL DAILY
Qty: 90 TABLET | Refills: 0 | Status: SHIPPED | OUTPATIENT
Start: 2023-07-07 | End: 2023-09-09

## 2023-07-07 ASSESSMENT — ENCOUNTER SYMPTOMS
HEMATOCHEZIA: 0
JOINT SWELLING: 0
PALPITATIONS: 0
DIARRHEA: 0
NAUSEA: 0
SORE THROAT: 0
FREQUENCY: 0
EYE PAIN: 0
CHILLS: 0
HEMATURIA: 0
ARTHRALGIAS: 0
PARESTHESIAS: 0
DYSURIA: 0
HEARTBURN: 0
HEADACHES: 0
COUGH: 0
ABDOMINAL PAIN: 0
FEVER: 0
NERVOUS/ANXIOUS: 0
BREAST MASS: 0
DIZZINESS: 0
MYALGIAS: 0
CONSTIPATION: 0

## 2023-07-07 NOTE — PROGRESS NOTES
SUBJECTIVE:   CC: Justyna is an 53 year old who presents for preventive health visit.     Healthy Habits:     Getting at least 3 servings of Calcium per day:  Yes    Bi-annual eye exam:  Yes    Dental care twice a year:  Yes    Sleep apnea or symptoms of sleep apnea:  Daytime drowsiness and Excessive snoring    Diet:  Regular (no restrictions)    Frequency of exercise:  1 day/week    Duration of exercise:  Less than 15 minutes    Taking medications regularly:  Yes    Medication side effects:  None    Additional concerns today:  Yes    Concerns:  * referral for sleep study  * weight   * hot flashes/menopause      - chronic pain/fibromyalgia: has seen specialist. Did physical therapy for carpal tunnel which has helped but still has stiffness.  Still has hip and shoulder pain.    - fatigue, had sleep visit 2020 but didn't do the sleep study. Now has woken herself up with apnea and would like to get the sleep study.    - hot flashes started last summer. Day and nighttime. Has fan at desk.  LMP 2021. Would like hormonal treatment.    - would like to discuss weight loss.  Tied optavia diet last year - lost hair, pain was worse, expensive  Has tried many diets in the past, they don't work well for her.  She always feels hungry, she rarely feels full after eating. She has cravings.     - has had to use the lasix more this spring for edema. Used to be only when traveling   Uses lasix PRN and that helps  Does not want compression, hard to get on  Less swelling if she uses stand/sit desk    Today's PHQ-2 Score:       7/7/2023    10:41 AM   PHQ-2 ( 1999 [employer])   Q1: Little interest or pleasure in doing things 0   Q2: Feeling down, depressed or hopeless 0   PHQ-2 Score 0   Q1: Little interest or pleasure in doing things Not at all   Q2: Feeling down, depressed or hopeless Not at all   PHQ-2 Score 0           Social History     Tobacco Use     Smoking status: Never     Smokeless tobacco: Never   Substance Use Topics     Alcohol  use: Yes     Comment: A couple drinks per week         7/5/2023     6:01 PM   Alcohol Use   Prescreen: >3 drinks/day or >7 drinks/week? No     Reviewed orders with patient.  Reviewed health maintenance and updated orders accordingly - Yes  Lab work is in process  Labs reviewed in EPIC  BP Readings from Last 3 Encounters:   07/07/23 122/88   09/19/22 111/82   08/04/22 121/79    Wt Readings from Last 3 Encounters:   07/07/23 106.3 kg (234 lb 4.8 oz)   09/19/22 100.2 kg (221 lb)   07/14/22 100.2 kg (221 lb)               Patient Active Problem List   Diagnosis     Chronic rhinitis     Fibromyalgia     Environmental allergies     Past Surgical History:   Procedure Laterality Date     COLONOSCOPY N/A 09/19/2022    Procedure: COLONOSCOPY;  Surgeon: Angel Peace MD;  Location: [facility]     ENT SURGERY  1976    Several sets of ear tubes as a child     GENITOURINARY SURGERY  2004    Bladder sling put in twice       Social History     Tobacco Use     Smoking status: Never     Smokeless tobacco: Never   Substance Use Topics     Alcohol use: Yes     Comment: A couple drinks per week     Family History   Adopted: Yes   Problem Relation Age of Onset     Diabetes Mother      Hypertension Mother      Hyperlipidemia Mother      Asthma Mother      Obesity Mother      Unknown/Adopted Other            Breast Cancer Screening:    FHS-7:       6/4/2022     8:36 AM 6/6/2023    11:33 AM   Breast CA Risk Assessment (FHS-7)   Did any of your first-degree relatives have breast or ovarian cancer? No Unknown   Did any of your relatives have bilateral breast cancer? No Unknown   Did any man in your family have breast cancer? Unknown Unknown   Did any woman in your family have breast and ovarian cancer? No Unknown   Did any woman in your family have breast cancer before age 50 y? No Unknown   Do you have 2 or more relatives with breast and/or ovarian cancer? No Unknown   Do you have 2 or more relatives with breast and/or bowel cancer? No Unknown        Mammogram Screening: Recommended annual mammography  Pertinent mammograms are reviewed under the imaging tab.    History of abnormal Pap smear: NO - age 30-65 PAP every 5 years with negative HPV co-testing recommended      Latest Ref Rng & Units 12/4/2020    10:01 AM 12/4/2020     9:50 AM 4/5/2017     2:00 PM   PAP / HPV   PAP (Historical)  NIL   NIL    HPV 16 DNA NEG^Negative  Negative  Negative    HPV 18 DNA NEG^Negative  Negative  Negative    Other HR HPV NEG^Negative  Negative  Negative      Reviewed and updated as needed this visit by clinical staff   Tobacco  Allergies  Meds  Problems  Med Hx  Surg Hx  Fam Hx          Reviewed and updated as needed this visit by Provider   Tobacco  Allergies  Meds  Problems  Med Hx  Surg Hx  Fam Hx         Past Medical History:   Diagnosis Date     Fibromyalgia      Uncomplicated asthma 2006    Weather and sports related      Past Surgical History:   Procedure Laterality Date     COLONOSCOPY N/A 09/19/2022    Procedure: COLONOSCOPY;  Surgeon: Angel Peace MD;  Location: [facility]     ENT SURGERY  1976    Several sets of ear tubes as a child     GENITOURINARY SURGERY  2004    Bladder sling put in twice       Review of Systems   Constitutional: Negative for chills and fever.   HENT: Negative for congestion, ear pain, hearing loss and sore throat.    Eyes: Negative for pain.   Respiratory: Negative for cough.    Cardiovascular: Positive for peripheral edema. Negative for chest pain and palpitations.   Gastrointestinal: Negative for abdominal pain, constipation, diarrhea, heartburn, hematochezia and nausea.   Breasts:  Negative for tenderness, breast mass and discharge.   Genitourinary: Negative for dysuria, frequency, genital sores, hematuria, pelvic pain, urgency, vaginal bleeding and vaginal discharge.   Musculoskeletal: Negative for arthralgias, joint swelling and myalgias.   Skin: Negative for rash.   Neurological: Negative for dizziness, headaches and  "paresthesias.   Psychiatric/Behavioral: Negative for mood changes. The patient is not nervous/anxious.         OBJECTIVE:   /88   Pulse 74   Temp 97.1  F (36.2  C) (Tympanic)   Resp 12   Ht 1.6 m (5' 3\")   Wt 106.3 kg (234 lb 4.8 oz)   LMP 03/18/2021 (Approximate)   SpO2 97%   BMI 41.50 kg/m    Physical Exam  GENERAL: healthy, alert and no distress  EYES: Eyes grossly normal to inspection, PERRL and conjunctivae and sclerae normal  HENT: ear canals and TM's normal, nose and mouth without ulcers or lesions  NECK: no adenopathy, no asymmetry, masses, or scars and thyroid normal to palpation  RESP: lungs clear to auscultation - no rales, rhonchi or wheezes  CV: regular rate and rhythm, normal S1 S2, no S3 or S4, no murmur, click or rub, no peripheral edema and peripheral pulses strong  ABDOMEN: soft, nontender, no hepatosplenomegaly, no masses and bowel sounds normal  MS: no gross musculoskeletal defects noted, no edema  SKIN: no suspicious lesions or rashes  NEURO: Normal strength and tone, mentation intact and speech normal  BACK: no CVA tenderness, no paralumbar tenderness  PSYCH: mentation appears normal, affect normal/bright  LYMPH: no cervical, supraclavicular, axillary, or inguinal adenopathy    Diagnostic Test Results:  Labs reviewed in Epic    ASSESSMENT/PLAN:     ASSESSMENT/PLAN:      ICD-10-CM    1. Routine general medical examination at a health care facility  Z00.00       2. Fibromyalgia  M79.7       3. Morbid obesity (H)  E66.01 TSH with free T4 reflex     TSH with free T4 reflex      4. Snoring  R06.83 Adult Sleep Eval & Management  Referral      5. Encounter for screening examination for impaired glucose regulation and diabetes mellitus  Z13.1 Hemoglobin A1c     Hemoglobin A1c      6. Multiple joint pain  M25.50       7. Leg swelling  M79.89 Comprehensive metabolic panel (BMP + Alb, Alk Phos, ALT, AST, Total. Bili, TP)     furosemide (LASIX) 20 MG tablet     Comprehensive " metabolic panel (BMP + Alb, Alk Phos, ALT, AST, Total. Bili, TP)      8. Acute cystitis with hematuria  N30.01 UA Macroscopic with reflex to Microscopic and Culture - Clinic Collect     UA Microscopic with Reflex to Culture      9. Screening for lipoid disorders  Z13.220 Lipid panel reflex to direct LDL Fasting     Lipid panel reflex to direct LDL Fasting      10. Menopausal syndrome (hot flashes)  N95.1 estrogen conj-medroxyPROGESTERone (PREMPRO) 0.3-1.5 MG tablet      11. Gastroesophageal reflux disease without esophagitis  K21.9 omeprazole (PRILOSEC) 10 MG DR capsule        - snoring, apnea, fatigue: follow up with sleep clinic  - hot flashes: patient not interested in SNRI/SSRI to treat. She would like to trial HRT. See below.  - leg swelling: lasix PRN works well. Check BMP today. Does not want to use compression.  - fibromyalgia/chronic pain: tizanidine PRN. Discussed interaction with HRT. Continue exercises.  Recommended follow up in 1 month or so to discuss how the hormones are working and to discuss weight medications further. She is interested in GLP1 and phentermine. Discussed each briefly, in particular phentermine.  - history of 2 UTIs this past year. Recheck UA to make sure no persistent hematuria.  - GERD: continue omeprazole 10 mg. Consider EGD in the future.    Patient Instructions   Look into weight management medications (and call insurance): GLP1 agonists, phentermine        Patient does not have contraindications for HRT such as:   history of breast cancer  CHD  a previous venous thromboembolic (VTE) event or stroke  active liver disease  unexplained vaginal bleeding  high-risk endometrial cancer  transient ischemic attack/CVD                   COUNSELING:  Reviewed preventive health counseling, as reflected in patient instructions       Regular exercise       Healthy diet/nutrition       Immunizations    Declined: Hepatitis B and Zoster due to Other will get this in the future                "Osteoporosis prevention/bone health       Colorectal Cancer Screening       (Ethel)menopause management       The 10-year ASCVD risk score (Harini FORRESTER, et al., 2019) is: 1.1%    Values used to calculate the score:      Age: 53 years      Sex: Female      Is Non- : No      Diabetic: No      Tobacco smoker: No      Systolic Blood Pressure: 122 mmHg      Is BP treated: No      HDL Cholesterol: 50 mg/dL      Total Cholesterol: 170 mg/dL      BMI:   Estimated body mass index is 41.5 kg/m  as calculated from the following:    Height as of this encounter: 1.6 m (5' 3\").    Weight as of this encounter: 106.3 kg (234 lb 4.8 oz).   Weight management plan: Discussed healthy diet and exercise guidelines      She reports that she has never smoked. She has never used smokeless tobacco.      Judy David PA-C  [facility]  "

## 2023-07-07 NOTE — PATIENT INSTRUCTIONS
Look into weight management medications (and call insurance): GLP1 agonists, phentermine    Preventive Health Recommendations  Female Ages 50 - 64    Yearly exam: See your health care provider every year in order to  Review health changes.   Discuss preventive care.    Review your medicines if your doctor has prescribed any.    Get a Pap test every three years (unless you have an abnormal result and your provider advises testing more often).  If you get Pap tests with HPV test, you only need to test every 5 years, unless you have an abnormal result.   You do not need a Pap test if your uterus was removed (hysterectomy) and you have not had cancer.  You should be tested each year for STDs (sexually transmitted diseases) if you're at risk.   Have a mammogram every 1 to 2 years.  Have a colonoscopy at age 50, or have a yearly FIT test (stool test). These exams screen for colon cancer.    Have a cholesterol test every 5 years, or more often if advised.  Have a diabetes test (fasting glucose) every three years. If you are at risk for diabetes, you should have this test more often.   If you are at risk for osteoporosis (brittle bone disease), think about having a bone density scan (DEXA).    Shots: Get a flu shot each year. Get a tetanus shot every 10 years.    Nutrition:   Eat at least 5 servings of fruits and vegetables each day.  Eat whole-grain bread, whole-wheat pasta and brown rice instead of white grains and rice.  Get adequate Calcium and Vitamin D.     Lifestyle  Exercise at least 150 minutes a week (30 minutes a day, 5 days a week). This will help you control your weight and prevent disease.  Limit alcohol to one drink per day.  No smoking.   Wear sunscreen to prevent skin cancer.   See your dentist every six months for an exam and cleaning.  See your eye doctor every 1 to 2 years.        Menopausal Hormone Therapy  What is menopausal hormone therapy?   Menopausal hormone therapy is a treatment for the symptoms  of menopause. Hormones are taken to replace some of the natural hormones that decrease at menopause. The 2 main female hormones are estrogen and progesterone.  Menopause is the time when a woman stops having menstrual periods. It usually starts gradually. The ovaries start making less hormone. Menstrual periods become less regular. After a time, periods stop completely.   Menopause happens suddenly if the ovaries are removed.  Menopause is usually part of a natural aging process. It is not a disease. For many women menopause is an easy change. Some women have problems caused by the decrease in hormones, particularly by the decrease in estrogen. These problems may be helped by treatment that replaces some of the lost hormone.  Taking estrogen alone increases the risk of cancer of the uterus. If you still have your uterus, treatment usually includes both estrogen and progesterone to reduce this risk. If your uterus has been removed, you may take estrogen alone.  Hormone replacement therapy (HRT) may be taken as:  tablets to be swallowed   patches or lotion to be put on the skin   a cream, ring, or tablet put into the vagina   pellets placed under the skin   shots  When is it used?   You may never have symptoms of menopause, or you may have them for a few weeks, for a few months, or sometimes over several years. Your symptoms may come and go. Your healthcare provider might recommend HRT to relieve the following symptoms, especially if they are very severe:  hot flashes   night sweats   vaginal dryness, sometimes causing discomfort or pain during sex   trouble sleeping   loss of sex drive (libido)   mood swings   depression   memory loss   headaches  HRT may be prescribed to treat symptoms of menopause if:  Other treatments are not helping your symptoms enough.   You and your provider decide the benefits may outweigh the risks.   Factors such as your age, race, family history, and health history will be considered. You  and your healthcare provider should discuss the risks and benefits of HRT for you.   Hormone therapy may be recommended for you if you have gone through menopause early (before the age of 40). HRT is often given to younger women who have had their ovaries removed.  What are the benefits of hormone therapy?   Relief of menopausal symptoms, including hot flashes and vaginal dryness   Prevention and treatment of osteoporosis. Osteoporosis is a weakening of the bones that starts around age 35 and makes it easier for the bones to break. However, lifestyle changes and other prescription medicines can also help prevent osteoporosis.   What are the risks of hormone therapy?   The risks of hormone therapy include:  Uterine cancer: Estrogen taken without progesterone increases the risk of cancer of the uterus. To lessen this risk, healthcare providers prescribe estrogen combined with progesterone for women who have not had their uterus removed.   Breast cancer: HRT may increase the risk of breast cancer. Talk to your healthcare provider about this risk. Many providers recommend that women be checked thoroughly for any tumors and have a mammogram before starting HRT. If you have a family history of breast cancer, be sure to discuss this with your provider.   Heart disease, stroke, and blood clots in the legs and lungs.  Hormone therapy may also increase your risk for some gallbladder problems and dementia.  Researchers and healthcare providers are studying the benefits and risks of HRT and learning new things every day. The risks described above may be different for lower dosages of estrogen and progesterone or progesterone only.   What are the side effects of hormone therapy?   The side effects of hormone therapy may include:  bloating, fluid retention, and weight gain   breast tenderness and enlargement   nausea   symptoms like those of premenstrual tension (PMS), such as headaches and mood swings when progesterone is part  of the treatment   abnormal blood clotting.   acne if you are taking estrogen with progesterone   headache   chloasma (tan blotches on your face).  If your treatment is with estrogen only, and you have your uterus, you may get some vaginal bleeding.   If your treatment includes both estrogen and progesterone, you may have some vaginal bleeding if there are days when you are not taking hormones. Not a menstrual period, the bleeding typically lasts 2 or 3 days. Usually you will not have any cramps with the bleeding.   If you take both estrogen and progesterone in low doses every day, the hormones will not cause bleeding except perhaps some spotting of blood for the first 2 to 3 months.  Tell your healthcare provider about any vaginal bleeding you have.  Who should not take hormone therapy?   Hormone therapy is not recommended for women who have any of these conditions, diseases, or medical history:  heart attack or stroke   high blood pressure you cannot control   blood clots in the legs, lungs, brain, or eyes   cancer of the breast or uterus   unexplained vaginal bleeding   liver disease  You should also not take hormones if you are pregnant or think you may be pregnant.  Also, if you smoke, you should avoid hormone therapy. Smoking while you are using this medicine increases the risk of serious side effects such as heart attack, stroke, and blood clots.   If you have any of the following diseases or conditions, you should talk to your provider about the effect of HRT on these conditions:  uterine fibroids (These benign tumors may grow and bleed in response to estrogen. They begin to shrink at menopause unless you are taking estrogen.)   endometriosis   fibrocystic breast disease   migraine headaches   gallbladder disease   high blood pressure   porphyria (nerve pain or sensitivity to sunlight)  What can I do to take care of myself?   If you are thinking about taking hormones:  Talk to your healthcare provider about  the risks and benefits.   Get a mammogram before you start HRT to check for breast cancer.   Ask your healthcare provider about:   the different types and dosages of hormone therapy   any side effects or special precautions you should know about while you are taking hormones   when you should start and stop taking the hormones  If you are already taking hormones:  Ask your healthcare provider about any special precautions or side effects.   If you are taking estrogen combined with progesterone, tell your provider if you have bleeding at any time other than the days when you do not take the hormones.   Don t change your dose without checking with your provider first.   Don t smoke.   Have a mammogram as often as your provider recommends.   Have a complete physical exam as often as your healthcare provider recommends. Your blood should be tested regularly for cholesterol levels and liver function.    Published by [employer].  This content is reviewed periodically and is subject to change as new health information becomes available. The information is intended to inform and educate and is not a replacement for medical evaluation, advice, diagnosis or treatment by a healthcare professional.

## 2023-08-01 DIAGNOSIS — R05.9 COUGH: ICD-10-CM

## 2023-08-01 DIAGNOSIS — Z91.09 MULTIPLE ENVIRONMENTAL ALLERGIES: ICD-10-CM

## 2023-08-01 RX ORDER — LEVOCETIRIZINE DIHYDROCHLORIDE 5 MG/1
5 TABLET, FILM COATED ORAL EVERY EVENING
Qty: 90 TABLET | Refills: 0 | Status: SHIPPED | OUTPATIENT
Start: 2023-08-01 | End: 2023-11-06

## 2023-08-01 RX ORDER — ALBUTEROL SULFATE 90 UG/1
2 AEROSOL, METERED RESPIRATORY (INHALATION) EVERY 6 HOURS PRN
Qty: 18 G | Refills: 0 | Status: SHIPPED | OUTPATIENT
Start: 2023-08-01 | End: 2024-05-31

## 2023-08-01 NOTE — TELEPHONE ENCOUNTER
"Routing refill request to provider for review/approval because:  A break in medication      Requested Prescriptions   Pending Prescriptions Disp Refills    levocetirizine (XYZAL ALLERGY 24HR) 5 MG tablet 90 tablet 3     Sig: Take 1 tablet (5 mg) by mouth every evening       Antihistamines Protocol Passed - 8/1/2023 10:06 AM        Passed - Patient is 3-64 years of age     Apply weight-based dosing for peds patients age 3 - 12 years of age.    Forward request to provider for patients under the age of 3 or over the age of 64.          Passed - Recent (12 mo) or future (30 days) visit within the authorizing provider's specialty     Patient has had an office visit with the authorizing provider or a provider within the authorizing providers department within the previous 12 mos or has a future within next 30 days. See \"Patient Info\" tab in inbasket, or \"Choose Columns\" in Meds & Orders section of the refill encounter.              Passed - Medication is active on med list          albuterol (PROAIR HFA/PROVENTIL HFA/VENTOLIN HFA) 108 (90 Base) MCG/ACT inhaler 18 g 0     Sig: Inhale 2 puffs into the lungs every 6 hours as needed for shortness of breath or wheezing       Asthma Maintenance Inhalers - Anticholinergics Failed - 8/1/2023 10:06 AM        Failed - Asthma control assessment score within normal limits in last 6 months     Please review ACT score.           Passed - Patient is age 12 years or older        Passed - Medication is active on med list        Passed - Recent (6 mo) or future (30 days) visit within the authorizing provider's specialty     Patient had office visit in the last 6 months or has a visit in the next 30 days with authorizing provider or within the authorizing provider's specialty.  See \"Patient Info\" tab in inbasket, or \"Choose Columns\" in Meds & Orders section of the refill encounter.           Short-Acting Beta Agonist Inhalers Protocol  Failed - 8/1/2023 10:06 AM        Failed - Asthma " "control assessment score within normal limits in last 6 months     Please review ACT score.           Passed - Patient is age 12 or older        Passed - Medication is active on med list        Passed - Recent (6 mo) or future (30 days) visit within the authorizing provider's specialty     Patient had office visit in the last 6 months or has a visit in the next 30 days with authorizing provider or within the authorizing provider's specialty.  See \"Patient Info\" tab in inbasket, or \"Choose Columns\" in Meds & Orders section of the refill encounter.                     Laura Murray RN 08/01/23 10:51 AM    "

## 2023-09-09 ENCOUNTER — MYC REFILL (OUTPATIENT)
Dept: FAMILY MEDICINE | Facility: CLINIC | Age: 53
End: 2023-09-09
Payer: COMMERCIAL

## 2023-09-09 DIAGNOSIS — N95.1 MENOPAUSAL SYNDROME (HOT FLASHES): ICD-10-CM

## 2023-09-11 RX ORDER — NORETHINDRONE ACETATE AND ETHINYL ESTRADIOL .5; 2.5 MG/1; UG/1
1 TABLET ORAL DAILY
Qty: 90 TABLET | Refills: 1 | Status: SHIPPED | OUTPATIENT
Start: 2023-09-11 | End: 2024-03-06

## 2023-11-03 DIAGNOSIS — Z91.09 MULTIPLE ENVIRONMENTAL ALLERGIES: ICD-10-CM

## 2023-11-06 RX ORDER — LEVOCETIRIZINE DIHYDROCHLORIDE 5 MG/1
5 TABLET, FILM COATED ORAL EVERY EVENING
Qty: 90 TABLET | Refills: 1 | Status: SHIPPED | OUTPATIENT
Start: 2023-11-06 | End: 2024-05-06

## 2023-11-06 NOTE — TELEPHONE ENCOUNTER
Prescription approved per [facility] Refill Protocol     Claudia Zarate     RN MSN

## 2023-11-26 ASSESSMENT — SLEEP AND FATIGUE QUESTIONNAIRES
HOW LIKELY ARE YOU TO NOD OFF OR FALL ASLEEP WHILE SITTING QUIETLY AFTER LUNCH WITHOUT ALCOHOL: WOULD NEVER DOZE
HOW LIKELY ARE YOU TO NOD OFF OR FALL ASLEEP WHILE WATCHING TV: MODERATE CHANCE OF DOZING
HOW LIKELY ARE YOU TO NOD OFF OR FALL ASLEEP WHILE SITTING AND READING: SLIGHT CHANCE OF DOZING
HOW LIKELY ARE YOU TO NOD OFF OR FALL ASLEEP WHILE SITTING INACTIVE IN A PUBLIC PLACE: WOULD NEVER DOZE
HOW LIKELY ARE YOU TO NOD OFF OR FALL ASLEEP WHILE SITTING AND TALKING TO SOMEONE: SLIGHT CHANCE OF DOZING
HOW LIKELY ARE YOU TO NOD OFF OR FALL ASLEEP WHEN YOU ARE A PASSENGER IN A CAR FOR AN HOUR WITHOUT A BREAK: HIGH CHANCE OF DOZING
HOW LIKELY ARE YOU TO NOD OFF OR FALL ASLEEP WHILE LYING DOWN TO REST IN THE AFTERNOON WHEN CIRCUMSTANCES PERMIT: MODERATE CHANCE OF DOZING
HOW LIKELY ARE YOU TO NOD OFF OR FALL ASLEEP IN A CAR, WHILE STOPPED FOR A FEW MINUTES IN TRAFFIC: SLIGHT CHANCE OF DOZING

## 2023-11-30 ENCOUNTER — MYC MEDICAL ADVICE (OUTPATIENT)
Dept: SLEEP MEDICINE | Facility: CLINIC | Age: 53
End: 2023-11-30

## 2023-11-30 ENCOUNTER — VIRTUAL VISIT (OUTPATIENT)
Dept: SLEEP MEDICINE | Facility: CLINIC | Age: 53
End: 2023-11-30
Attending: PHYSICIAN ASSISTANT
Payer: COMMERCIAL

## 2023-11-30 VITALS — HEIGHT: 63 IN | BODY MASS INDEX: 40.75 KG/M2 | WEIGHT: 230 LBS

## 2023-11-30 DIAGNOSIS — R06.83 SNORING: ICD-10-CM

## 2023-11-30 DIAGNOSIS — G47.30 SLEEP-RELATED BREATHING DISORDER: Primary | ICD-10-CM

## 2023-11-30 DIAGNOSIS — E66.01 OBESITY, CLASS III, BMI 40-49.9 (MORBID OBESITY) (H): ICD-10-CM

## 2023-11-30 DIAGNOSIS — F51.04 PSYCHOPHYSIOLOGICAL INSOMNIA: Primary | ICD-10-CM

## 2023-11-30 DIAGNOSIS — F51.04 PSYCHOPHYSIOLOGICAL INSOMNIA: ICD-10-CM

## 2023-11-30 DIAGNOSIS — E78.1 HYPERTRIGLYCERIDEMIA WITHOUT HYPERCHOLESTEROLEMIA: ICD-10-CM

## 2023-11-30 PROBLEM — E66.813 OBESITY, CLASS III, BMI 40-49.9 (MORBID OBESITY) (H): Status: ACTIVE | Noted: 2023-11-30

## 2023-11-30 PROCEDURE — 99205 OFFICE O/P NEW HI 60 MIN: CPT | Mod: VID | Performed by: NURSE PRACTITIONER

## 2023-11-30 RX ORDER — ZOLPIDEM TARTRATE 5 MG/1
TABLET ORAL
Qty: 1 TABLET | Refills: 0 | Status: SHIPPED | OUTPATIENT
Start: 2023-11-30

## 2023-11-30 RX ORDER — HYDROXYZINE HYDROCHLORIDE 25 MG/1
25 TABLET, FILM COATED ORAL
Qty: 1 TABLET | Refills: 1 | Status: SHIPPED | OUTPATIENT
Start: 2023-11-30 | End: 2024-03-07 | Stop reason: SINTOL

## 2023-11-30 ASSESSMENT — PAIN SCALES - GENERAL: PAINLEVEL: NO PAIN (0)

## 2023-11-30 NOTE — PROGRESS NOTES
Virtual Visit Details    Type of service:  Video Visit 9:00 AM-9:40 AM    Originating Location (pt. Location): Home    Distant Location (provider location):  Off-site  Platform used for Video Visit: Well

## 2023-11-30 NOTE — NURSING NOTE
Is the patient currently in the state of [place]? YES    Visit mode:VIDEO    If the visit is dropped, the patient can be reconnected by: VIDEO VISIT: Text to cell phone:   Telephone Information:   Mobile [phone]       Will anyone else be joining the visit? NO  (If patient encounters technical issues they should call [phone]    How would you like to obtain your AVS? MyChart    Are changes needed to the allergy or medication list? Pt stated no changes to allergies and Pt stated no med changes    Reason for visit: Consult  Has patient had flu shot for current/most recent flu season? If so, when? No    Uma HOWELL

## 2023-11-30 NOTE — PROGRESS NOTES
Outpatient Sleep Medicine Consultation:      Name: Justyna Zhang MRN# [id number]   Age: 53 year old YOB: 1970     Date of Consultation: November 30, 2023  Consultation is requested by: Judy David PA-C  [id number] [place] [address] Judy David  Primary care provider: Judy David       Reason for Sleep Consult:     Justyna Zhang is sent by Judy David for a sleep consultation regarding snoring    Patient s Reason for visit  Justyna Zhang main reason for visit: My  says i snore, i occasionally catch myself stopping breathing, i have fallen asleep while driving.  Patient states problem(s) started: Years ago.  Justyna Zhang's goals for this visit: Decide if i need help with my sleeping           Assessment and Plan:     Summary Sleep Diagnoses:  Symptoms consistent with obstructive sleep apnea  Socially disruptive snoring  Snort arousals  Witnessed episodes of apnea  Excessive daytime sleepiness  Frequent nocturnal leg movements  Bruxism  Morning headaches    Comorbid Diagnoses:  Chronic rhinitis  Fibromyalgia  Class III obesity  Hypertriglyceridemia    Summary Recommendations:  Orders Placed This Encounter   Procedures    HST-Home Sleep Apnea Test - Noxturnal Returnable   1.  Recommend patient undergo sleep testing.  Patient STOP-BANG score is 7/8 indicating an increased pretest probability for obstructive sleep apnea.  HST ordered.  2.  Recommend patient return to clinic approximately 2 weeks after sleep study has been completed.  At that time we will review the results as well as to determine plan of care going forward.  3.  Recommend patient optimize sleep schedule as well as his sleep hygiene practices.  This will mitigate any future sleep intrusion.  4.  Recommend patient employ safe driving practices such as not driving a car if drowsy.  5.  Weight management to BMI of 30    Summary Counseling:    Sleep Testing Reviewed:  HST  Obstructive Sleep Apnea Reviewed   -Discussed pathophysiology of obstructive sleep apnea as well as central sleep apnea   -Discussed all methods of treatment of sleep apnea including PAP therapy, mandibular advancement device, surgical options  Complications of Untreated Sleep Apnea Reviewed in the context of his medical diagnoses      Medical Decision-making:   Educational materials provided in instructions    Total time spent reviewing medical records, history and physical examination, review of previous testing and interpretation as well as documentation on this date: 60 minutes    CC: Judy David          History of Present Illness:     Justyna Zhang is a pleasant 53-year-old postmenopausal female who presents to the sleep medicine clinic upon the advice of her medical provider, Judy David, for snoring.    Patient has a past medical history notable for fibromyalgia, chronic rhinitis, class III obesity, hypertriglyceridemia.    Patient was last to the sleep medicine clinic on 12/11/2020 for an evaluation for both sleep apnea but did not follow through with the sleep test that was ordered.  Since that time, patient has gained 10 pounds.  She reports persistent symptoms of obstructive sleep apnea including socially disruptive snoring, snort arousals, witnessed episodes of apnea, excessive daytime sleepiness, morning headaches, mouth dryness, and nasal congestion upon awakening, nocturnal GERD, somniloquy, bruxism.  Patient notes that she does doze unintentionally.    Patient notes that she has fallen asleep while she has been driving.  She has not had any motor vehicle accidents.    Justyna has a STOP-BANG score of 7/8, indicating a high pretest probability for obstructive sleep apnea.  Plans for her to include a home sleep test, which she prefers with the benefit of zolpidem 5 mg p.o. to promote sleepiness.    I have also provided follow-up with a prescription for hydroxyzine to use so she may  return to sleep after her multiple awakenings.  She describes being up and has rumination.  I explained to her that this is a short-term answer, and once we have her sleep study results and, should she have sleep apnea the treatment will be a CPAP rather than hydroxyzine.  She is in agreement with this plan of action.        Past Sleep Evaluations: None    SLEEP-WAKE SCHEDULE:     Work/School Days: Patient goes to school/work: Yes   Usually gets into bed at Between 12:00 and 2:00am Insomnia  Takes patient about Approx half an hour once i go to bed, i usually wait to go to bed until i am falling asleep on the couch to fall asleep  Has trouble falling asleep Most nights nights per week  Wakes up in the middle of the night Usually 1 time per night times.  Wakes up due to Snorting self awake;Pain  She has trouble falling back asleep 1 to 2 times a week times a week.   It usually takes Half an hour or so. to get back to sleep  Patient is usually up at 6:00/6:15  Uses alarm: Yes    Weekends/Non-work Days/All Other Days:  Usually gets into bed at 12:00 am or so   Takes patient about Half an hour or so to fall asleep  Patient is usually up at 8/9:00am  Uses alarm: No    Sleep Need  Patient gets  Deep sleep approximately 1.5-2hrs, light sleep approx 3 hrs sleep on average   Patient thinks she needs about 4 to 6 hrs sleep    Justyna CARRERA Leatha prefers to sleep in this position(s): Back;Side   Patient states they do the following activities in bed: Read;Watch TV;Use phone, computer, or tablet    Naps  Patient takes a purposeful nap Very rarely times a week and naps are usually No lnonger than 30min in duration  She feels better after a nap: Yes  She dozes off unintentionally 2 days days per week  Patient has had a driving accident or near-miss due to sleepiness/drowsiness: No      SLEEP DISRUPTIONS:    Breathing/Snoring  Patient snores:Yes  Other people complain about her snoring: Yes  Patient has been told she stops  breathing in her sleep:No  She has issues with the following: Morning headaches;Morning mouth dryness;Stuffy nose when you wake up;Heartburn or reflux at night    Movement:  Patient gets pain, discomfort, with an urge to move:  Yes  It happens when she is resting:  Yes  It happens more at night:  Yes  Patient has been told she kicks her legs at night:  No     Behaviors in Sleep:  Justyna Zhang has experienced the following behaviors while sleeping: Sleep-talking;Teeth grinding  She has experienced sudden muscle weakness during the day: No      Is there anything else you would like your sleep provider to know:          CAFFEINE AND OTHER SUBSTANCES:    Patient consumes caffeinated beverages per day:  10oz of coffe or 12oz of pop per day, not both  Last caffeine use is usually: In the morning and done by 1:00pm  List of any prescribed or over the counter stimulants that patient takes:    List of any prescribed or over the counter sleep medication patient takes: Tarazadone, but i rarely take it.   List of previous sleep medications that patient has tried:    Patient drinks alcohol to help them sleep: No  Patient drinks alcohol near bedtime: Yes    Family History:  Patient has a family member been diagnosed with a sleep disorder:              SCALES:    EPWORTH SLEEPINESS SCALE         11/26/2023     6:52 PM    [place] Sleepiness Scale ( ZORAIDA Campuzano  [id number]<br>ESS - [place]/English - Final version - 21 Nov 07 - [facility] [place].)   Sitting and reading Slight chance of dozing   Watching TV Moderate chance of dozing   Sitting, inactive in a public place (e.g. a theatre or a meeting) Would never doze   As a passenger in a car for an hour without a break High chance of dozing   Lying down to rest in the afternoon when circumstances permit Moderate chance of dozing   Sitting and talking to someone Slight chance of dozing   Sitting quietly after a lunch without alcohol Would never doze   In a car, while stopped for  a few minutes in traffic Slight chance of dozing   Woodruff Score (MC) 10   Woodruff Score (Sleep) 10         INSOMNIA SEVERITY INDEX (WILLIAM)          11/26/2023     6:30 PM   Insomnia Severity Index (WILLIAM)   Difficulty falling asleep 3   Difficulty staying asleep 2   Problems waking up too early 0   How SATISFIED/DISSATISFIED are you with your CURRENT sleep pattern? 2   How NOTICEABLE to others do you think your sleep problem is in terms of impairing the quality of your life? 2   How WORRIED/DISTRESSED are you about your current sleep problem? 2   To what extent do you consider your sleep problem to INTERFERE with your daily functioning (e.g. daytime fatigue, mood, ability to function at work/daily chores, concentration, memory, mood, etc.) CURRENTLY? 2   WILLIAM Total Score 13       Guidelines for Scoring/Interpretation:  Total score categories:  0-7 = No clinically significant insomnia   8-14 = Subthreshold insomnia   15-21 = Clinical insomnia (moderate severity)  22-28 = Clinical insomnia (severe)  Used via courtesy of www.[employer].va.gov with permission from Virgilio Rodriguez PhD., [facility]      STOP BANG         11/26/2023     6:53 PM   STOP BANG Questionnaire (  2008, the American Society of Anesthesiologists, Inc. [employer]   1. Snoring - Do you snore loudly (louder than talking or loud enough to be heard through closed doors)? Yes   2. Tired - Do you often feel tired, fatigued, or sleepy during daytime? Yes   3. Observed - Has anyone observed you stop breathing during your sleep? No   4. Blood pressure - Do you have or are you being treated for high blood pressure? No   5. BMI - BMI more than 35 kg/m2? Yes   6. Age - Age over 50 yr old? Yes   7. Neck circumference - Neck circumference greater than 40 cm? No   8. Gender - Gender male? No   STOP BANG Score (MC): 5 (High risk of LOUISA)         GAD7         No data to display                  CAGE-AID         No data to display           "      CAGE-AID reprinted with permission from the [place] Medical Journal, RIP De. and EVELYNE Sanchez, \"Conjoint screening questionnaires for alcohol and drug abuse\" [place] Medical Journal 94: 135-140, 1995.      PATIENT HEALTH QUESTIONNAIRE-9 (PHQ - 9)         No data to display                Developed by Otto Penaloza, Tanna Thomas, Lucien Mitchell and colleagues, with an educational eloisa from [employer] No permission required to reproduce, translate, display or distribute.        Allergies:    Allergies   Allergen Reactions    Ciprofloxacin Hives    Shellfish-Derived Products Anaphylaxis       Medications:    Current Outpatient Medications   Medication Sig Dispense Refill    albuterol (PROAIR HFA/PROVENTIL HFA/VENTOLIN HFA) 108 (90 Base) MCG/ACT inhaler Inhale 2 puffs into the lungs every 6 hours as needed for shortness of breath or wheezing 18 g 0    EPINEPHrine (ANY BX GENERIC EQUIV) 0.3 MG/0.3ML injection 2-pack Inject 0.3 mLs (0.3 mg) into the muscle as needed for anaphylaxis 1 each 1    furosemide (LASIX) 20 MG tablet Take 1 tablet (20 mg) by mouth daily as needed (edema) 90 tablet 1    levocetirizine (XYZAL) 5 MG tablet TAKE 1 TABLET (5 MG) BY MOUTH EVERY EVENING 90 tablet 1    norethindrone-eth estradiol (FEMHRT LOW DOSE) 0.5-2.5 MG-MCG tablet Take 1 tablet by mouth daily 90 tablet 1    omeprazole (PRILOSEC) 10 MG DR capsule Take 1 capsule (10 mg) by mouth daily 90 capsule 3    tiZANidine (ZANAFLEX) 4 MG tablet Take 1 tablet (4 mg) by mouth nightly as needed for muscle spasms 90 tablet 0       Problem List:  Patient Active Problem List    Diagnosis Date Noted    Chronic rhinitis 04/05/2017     Priority: Medium    Fibromyalgia 08/07/2009     Priority: Medium    Environmental allergies 08/07/2009     Priority: Medium     Overview:   Reacted to allergy injections, was advised to carry epipen for environmental allergies  Reacted to allergy injections, was advised to carry epipen for " environmental allergies          Past Medical/Surgical History:  Past Medical History:   Diagnosis Date    Fibromyalgia     Uncomplicated asthma 2006    Weather and sports related     Past Surgical History:   Procedure Laterality Date    COLONOSCOPY N/A 09/19/2022    Procedure: COLONOSCOPY;  Surgeon: Angel Peace MD;  Location: [facility]    ENT SURGERY  1976    Several sets of ear tubes as a child    GENITOURINARY SURGERY  2004    Bladder sling put in twice       Social History:  Social History     Socioeconomic History    Marital status:      Spouse name: Not on file    Number of children: Not on file    Years of education: Not on file    Highest education level: Not on file   Occupational History    Not on file   Tobacco Use    Smoking status: Never    Smokeless tobacco: Never   Substance and Sexual Activity    Alcohol use: Yes     Comment: A couple drinks per week    Drug use: Never    Sexual activity: Yes     Partners: Male     Birth control/protection: Male Surgical   Other Topics Concern    Parent/sibling w/ CABG, MI or angioplasty before 65F 55M? No   Social History Narrative    Not on file     Social Determinants of Health     Financial Resource Strain: Not on file   Food Insecurity: Not on file   Transportation Needs: Not on file   Physical Activity: Not on file   Stress: Not on file   Social Connections: Not on file   Interpersonal Safety: Not on file   Housing Stability: Not on file       Family History:  Family History   Adopted: Yes   Problem Relation Age of Onset    Diabetes Mother     Hypertension Mother     Hyperlipidemia Mother     Asthma Mother     Obesity Mother     Unknown/Adopted Other        Review of Systems:  A complete review of systems reviewed by me is negative with the exeption of what has been mentioned in the history of present illness.  In the last TWO WEEKS have you experienced any of the following symptoms?  Fevers: No  Night Sweats: No  Weight Gain: No  Pain at Night:  "Yes  Double Vision: No  Changes in Vision: No  Difficulty Breathing through Nose: No  Sore Throat in Morning: Yes  Dry Mouth in the Morning: Yes  Shortness of Breath Lying Flat: No  Shortness of Breath With Activity: No  Awakening with Shortness of Breath: No  Increased Cough: No  Heart Racing at Night: No  Swelling in Feet or Legs: No  Diarrhea at Night: No  Heartburn at Night: Yes  Urinating More than Once at Night: No  Losing Control of Urine at Night: No  Joint Pains at Night: Yes  Headaches in Morning: Yes  Weakness in Arms or Legs: No  Depressed Mood: No  Anxiety: No     Physical Examination:  Vitals: Ht 1.6 m (5' 3\")   Wt 104.3 kg (230 lb)   LMP 03/18/2021 (Approximate)   BMI 40.74 kg/m    BMI= Body mass index is 40.74 kg/m .         Physical Exam  Constitutional:       General: She is not in acute distress.     Appearance: Normal appearance. She is obese. She is not ill-appearing, toxic-appearing or diaphoretic.   HENT:      Head: Normocephalic and atraumatic.      Right Ear: External ear normal.      Left Ear: External ear normal.      Nose: Nose normal.      Mouth/Throat:      Mouth: Mucous membranes are moist.      Pharynx: Oropharynx is clear.   Eyes:      Conjunctiva/sclera: Conjunctivae normal.   Pulmonary:      Effort: Pulmonary effort is normal.   Musculoskeletal:         General: Normal range of motion.      Cervical back: Normal range of motion and neck supple.   Neurological:      General: No focal deficit present.      Mental Status: She is alert and oriented to person, place, and time.   Psychiatric:         Mood and Affect: Mood normal.         Behavior: Behavior normal.         Thought Content: Thought content normal.         Judgment: Judgment normal.             Physical examination is limited by the nature of a virtual visit          Data: All pertinent previous laboratory data reviewed     Recent Labs   Lab Test 07/07/23  1155 06/06/22  0848    138   POTASSIUM 4.2 3.9   CHLORIDE " "104 107   CO2 25 26   ANIONGAP 10 5   GLC 95 97   BUN 14.9 17   CR 0.73 0.74   CHARLES 9.9 8.8       Recent Labs   Lab Test 06/06/22  0848   WBC 5.6   RBC 5.11   HGB 14.6   HCT 44.1   MCV 86   MCH 28.6   MCHC 33.1   RDW 13.3          Recent Labs   Lab Test 07/07/23  1155   PROTTOTAL 7.7   ALBUMIN 4.5   BILITOTAL 0.5   ALKPHOS 92   AST 33   ALT 33       TSH   Date Value   07/07/2023 2.31 uIU/mL   06/06/2022 2.97 mU/L   01/21/2016 2.80 mcU/mL       No results found for: \"UAMP\", \"UBARB\", \"BENZODIAZEUR\", \"UCANN\", \"UCOC\", \"OPIT\", \"UPCP\"    No results found for: \"IRONSAT\", \"[id number]\", \"JOSÉ MANUEL\"    No results found for: \"PH\", \"PHARTERIAL\", \"PO2\", \"RQ7QAXMVHOL\", \"SAT\", \"PCO2\", \"HCO3\", \"BASEEXCESS\", \"LUCERO\", \"BEB\"    @LABRCNTIPR(phv:4,pco2v:4,po2v:4,hco3v:4,jennifer:4,o2per:4)@    Echocardiology: No results found for this or any previous visit (from the past 4320 hour(s)).    Chest x-ray: No results found for this or any previous visit from the past 365 days.      Chest CT: No results found for this or any previous visit from the past 365 days.      PFT: Most Recent [facility]ze Pulmonary Function Testing          EVER Mcdonough CNP 11/30/2023   Sleep Medicine    This note was written with the assistance of the Dragon voice-dictation technology software. The final document, although reviewed, may contain errors. For corrections, please contact the office.          "

## 2023-11-30 NOTE — PATIENT INSTRUCTIONS
"          MY TREATMENT INFORMATION FOR SLEEP APNEA-  Justyna Zhang    DOCTOR : EVER Mcdonough CNP    Am I having a sleep study at a sleep center?  --->Due to normal delays, you will be contacted within 2-4 weeks to schedule    Am I having a home sleep study?  --->Watch the video for the device you are using:    -/drop off device-   https://www.[employer].com/watch?v=yGGFBdELGhk    -Disposable device sent out require phone/computer application-   https://www.[employer].com/watch?v=BCce_vbiwxE      Frequently asked questions:  1. What is Obstructive Sleep Apnea (LOUISA)? LOUISA is the most common type of sleep apnea. Apnea means, \"without breath.\"  Apnea is most often caused by narrowing or collapse of the upper airway as muscles relax during sleep.   Almost everyone has occasional apneas. Most people with sleep apnea have had brief interruptions at night frequently for many years.  The severity of sleep apnea is related to how frequent and severe the events are.   2. What are the consequences of LOUISA? Symptoms include: feeling sleepy during the day, snoring loudly, gasping or stopping of breathing, trouble sleeping, and occasionally morning headaches or heartburn at night.  Sleepiness can be serious and even increase the risk of falling asleep while driving. Other health consequences may include development of high blood pressure and other cardiovascular disease in persons who are susceptible. Untreated LOUISA  can contribute to heart disease, stroke and diabetes.   3. What are the treatment options? In most situations, sleep apnea is a lifelong disease that must be managed with daily therapy. Medications are not effective for sleep apnea and surgery is generally not considered until other therapies have been tried. Your treatment is your choice . Continuous Positive Airway (CPAP) works right away and is the therapy that is effective in nearly everyone. An oral device to hold your jaw forward is usually the " next most reliable option. Other options include postioning devices (to keep you off your back), weight loss, and surgery including a tongue pacing device. There is more detail about some of these options below.  4. Are my sleep studies covered by insurance? Although we will request verification of coverage, we advise you also check in advance of the study to ensure there is coverage.    Important tips for those choosing CPAP and similar devices  For new devices, sign up for device SAL to monitor your device for your followup visits  We encourage you to utilize the [employer] sal or website (myAir web (resmed.com) ) to monitor your therapy progress and share the data with your healthcare team when you discuss your sleep apnea.                                                    Know your equipment:  CPAP is continuous positive airway pressure that prevents obstructive sleep apnea by keeping the throat from collapsing while you are sleeping. In most cases, the device is  smart  and can slowly self-adjusts if your throat collapses and keeps a record every day of how well you are treated-this information is available to you and your care team.  BPAP is bilevel positive airway pressure that keeps your throat open and also assists each breath with a pressure boost to maintain adequate breathing.  Special kinds of BPAP are used in patients who have inadequate breathing from lung or heart disease. In most cases, the device is  smart  and can slowly self-adjusts to assist breathing. Like CPAP, the device keeps a record of how well you are treated.  Your mask is your connection to the device. You get to choose what feels most comfortable and the staff will help to make sure if fits. Here: are some examples of the different masks that are available: Magnetic mask aids may assist with use but there are safety issues that should be addressed when considering with magnets* ( see end of discussion).       Key points to  remember on your journey with sleep apnea:  Sleep study.  PAP devices often need to be adjusted during a sleep study to show that they are effective and adjusted right.  Good tips to remember: Try wearing just the mask during a quiet time during the day so your body adapts to wearing it. A humidifier is recommended for comfort in most cases to prevent drying of your nose and throat. Allergy medication from your provider may help you if you are having nasal congestion.  Getting settled-in. It takes more than one night for most of us to get used to wearing a mask. Try wearing just the mask during a quiet time during the day so your body adapts to wearing it. A humidifier is recommended for comfort in most cases. Our team will work with you carefully on the first day and will be in contact within 4 days and again at 2 and 4 weeks for advice and remote device adjustments. Your therapy is evaluated by the device each day.   Use it every night. The more you are able to sleep naturally for 7-8 hours, the more likely you will have good sleep and to prevent health risks or symptoms from sleep apnea. Even if you use it 4 hours it helps. Occasionally all of us are unable to use a medical therapy, in sleep apnea, it is not dangerous to miss one night.   Communicate. Call our skilled team on the number provided on the first day if your visit for problems that make it difficult to wear the device. Over 2 out of 3 patients can learn to wear the device long-term with help from our team. Remember to call our team or your sleep providers if you are unable to wear the device as we may have other solutions for those who cannot adapt to mask CPAP therapy. It is recommended that you sleep your sleep provider within the first 3 months and yearly after that if you are not having problems.   Use it for your health. We encourage use of CPAP masks during daytime quiet periods to allow your face and brain to adapt to the sensation of CPAP so  that it will be a more natural sensation to awaken to at night or during naps. This can be very useful during the first few weeks or months of adapting to CPAP though it does not help medically to wear CPAP during wakefulness and  should not be used as a strategy just to meet guidelines.  Take care of your equipment. Make sure you clean your mask and tubing using directions every day and that your filter and mask are replaced as recommended or if they are not working.     *Masks with magnets:  Updated Contraindications  Masks with magnetic components are contraindicated for use by patients where they, or anyone in close physical contact while using the mask, have the following:   Active medical implants that interact with magnets (i.e., pacemakers, implantable cardioverter defibrillators (ICD), neurostimulators, cerebrospinal fluid (CSF) shunts, insulin/infusion pumps)   Metallic implants/objects containing ferromagnetic material (i.e., aneurysm clips/flow disruption devices, embolic coils, stents, valves, electrodes, implants to restore hearing or balance with implanted magnets, ocular implants, metallic splinters in the eye)  Updated Warning  Keep the mask magnets at a safe distance of at least 6 inches (150 mm) away from implants or medical devices that may be adversely affected by magnetic interference. This warning applies to you or anyone in close physical contact with your mask. The magnets are in the frame and lower headgear clips, with a magnetic field strength of up to 400mT. When worn, they connect to secure the mask but may inadvertently detach while asleep.  Implants/medical devices, including those listed within contraindications, may be adversely affected if they change function under external magnetic fields or contain ferromagnetic materials that attract/repel to magnetic fields (some metallic implants, e.g., contact lenses with metal, dental implants, metallic cranial plates, screws, jarek hole  covers, and bone substitute devices). Consult your physician and  of your implant / other medical device for information on the potential adverse effects of magnetic fields.    BESIDES CPAP, WHAT OTHER THERAPIES ARE THERE?    Positioning Device  Positioning devices are generally used when sleep apnea is mild and only occurs on your back.This example shows a pillow that straps around the waist. It may be appropriate for those whose sleep study shows milder sleep apnea that occurs primarily when lying flat on one's back. Preliminary studies have shown benefit but effectiveness at home may need to be verified by a home sleep test. These devices are generally not covered by medical insurance.  Examples of devices that maintain sleeping on the back to prevent snoring and mild sleep apnea.    Belt type body positioner  http://[employer]/    Electronic reminder  http://nightshifttherapy.com/            Oral Appliance  What is oral appliance therapy?  An oral appliance device fits on your teeth at night like a retainer used after having braces. The device is made by a specialized dentist and requires several visits over 1-2 months before a manufactured device is made to fit your teeth and is adjusted to prevent your sleep apnea. Once an oral device is working properly, snoring should be improved. A home sleep test may be recommended at that time if to determine whether the sleep apnea is adequately treated.       Some things to remember:  -Oral devices are often, but not always, covered by your medical insurance. Be sure to check with your insurance provider.   -If you are referred for oral therapy, you will be given a list of specialized dentists to consider or you may choose to visit the Web site of the American Academy of Dental Sleep Medicine  -Oral devices are less likely to work if you have severe sleep apnea or are extremely overweight.     More detailed information  An oral appliance is a small acrylic  device that fits over the upper and lower teeth  (similar to a retainer or a mouth guard). This device slightly moves jaw forward, which moves the base of the tongue forward, opens the airway, improves breathing for effective treat snoring and obstructive sleep apnea in perhaps 7 out of 10 people .  The best working devices are custom-made by a dental device  after a mold is made of the teeth 1, 2, 3.  When is an oral appliance indicated?  Oral appliance therapy is recommended as a first-line treatment for patients with primary snoring, mild sleep apnea, and for patients with moderate sleep apnea who prefer appliance therapy to use of CPAP4, 5. Severity of sleep apnea is determined by sleep testing and is based on the number of respiratory events per hour of sleep.   How successful is oral appliance therapy?  The success rate of oral appliance therapy in patients with mild sleep apnea is 75-80% while in patients with moderate sleep apnea it is 50-70%. The chance of success in patients with severe sleep apnea is 40-50%. The research also shows that oral appliances have a beneficial effect on the cardiovascular health of LOUISA patients at the same magnitude as CPAP therapy7.  Oral appliances should be a second-line treatment in cases of severe sleep apnea, but if not completely successful then a combination therapy utilizing CPAP plus oral appliance therapy may be effective. Oral appliances tend to be effective in a broad range of patients although studies show that the patients who have the highest success are females, younger patients, those with milder disease, and less severe obesity. 3, 6.   Finding a dentist that practices dental sleep medicine  Specific training is available through the American Academy of Dental Sleep Medicine for dentists interested in working in the field of sleep. To find a dentist who is educated in the field of sleep and the use of oral appliances, near you, visit the Web site  of the American Academy of Dental Sleep Medicine.    References  1. Sue et al. Objectively measured vs self-reported compliance during oral appliance therapy for sleep-disordered breathing. Chest 2013; 144(5): 0947-4515.  2. Heath et al. Objective measurement of compliance during oral appliance therapy for sleep-disordered breathing. Thorax 2013; 68(1): 91-96.  3. Ramiro et al. Mandibular advancement devices in 620 men and women with LOUISA and snoring: tolerability and predictors of treatment success. Chest 2004; 125: 8536-6867.  4. Bobby et al. Oral appliances for snoring and LOUISA: a review. Sleep 2006; 29: 244-262.  5. Molly et al. Oral appliance treatment for LOUISA: an update. J Clin Sleep Med 2014; 10(2): 215-227.  6. Billy et al. Predictors of OSAH treatment outcome. J Dent Res 2007; 86: 6012-8018.      Weight Loss:    Weight loss is a long-term strategy that may improve sleep apnea in some patients.    Weight management is a personal decision and the decision should be based on your interest and the potential benefits.  If you are interested in exploring weight loss strategies, the following discussion covers the impact on weight loss on sleep apnea and the approaches that may be successful.    Being overweight does not necessarily mean you will have health consequences.  Those who have BMI over 35 or over 27 with existing medical conditions carries greater risk.   Weight loss decreases severity of sleep apnea in most people with obesity. For those with mild obesity who have developed snoring with weight gain, even 15-30 pound weight loss can improve and occasionally eliminate sleep apnea.  Structured and life-long dietary and health habits are necessary to lose weight and keep healthier weight levels.     Though there may be significant health benefits from weight loss, long-term weight loss is very difficult to achieve- studies show success with dietary management in less than 10%  of people. In addition, substantial weight loss may require years of dietary control and may be difficult if patients have severe obesity. In these cases, surgical management may be considered.  Finally, older individuals who have tolerated obesity without health complications may be less likely to benefit from weight loss strategies.      [unfilled]    Surgery:    Surgery for obstructive sleep apnea is considered generally only when other therapies fail to work. Surgery may be discussed with you if you are having a difficult time tolerating CPAP and or when there is an abnormal structure that requires surgical correction.  Nose and throat surgeries often enlarge the airway to prevent collapse.  Most of these surgeries create pain for 1-2 weeks and up to half of the most common surgeries are not effective throughout life.  You should carefully discuss the benefits and drawbacks to surgery with your sleep provider and surgeon to determine if it is the best solution for you.   More information  Surgery for LOUISA is directed at areas that are responsible for narrowing or complete obstruction of the airway during sleep.  There are a wide range of procedures available to enlarge and/or stabilize the airway to prevent blockage of breathing in the three major areas where it can occur: the palate, tongue, and nasal regions.  Successful surgical treatment depends on the accurate identification of the factors responsible for obstructive sleep apnea in each person.  A personalized approach is required because there is no single treatment that works well for everyone.  Because of anatomic variation, consultation with an examination by a sleep surgeon is a critical first step in determining what surgical options are best for each patient.  In some cases, examination during sedation may be recommended in order to guide the selection of procedures.  Patients will be counseled about risks and benefits as well as the typical recovery  course after surgery. Surgery is typically not a cure for a person s LOUISA.  However, surgery will often significantly improve one s LOUISA severity (termed  success rate ).  Even in the absence of a cure, surgery will decrease the cardiovascular risk associated with OSA7; improve overall quality of life8 (sleepiness, functionality, sleep quality, etc).      Palate Procedures:  Patients with LOUISA often have narrowing of their airway in the region of their tonsils and uvula.  The goals of palate procedures are to widen the airway in this region as well as to help the tissues resist collapse.  Modern palate procedure techniques focus on tissue conservation and soft tissue rearrangement, rather than tissue removal.  Often the uvula is preserved in this procedure. Residual sleep apnea is common in patient after pharyngoplasty with an average reduction in sleep apnea events of 33%2.      Tongue Procedures:  ExamWhile patients are awake, the muscles that surround the throat are active and keep this region open for breathing. These muscles relax during sleep, allowing the tongue and other structures to collapse and block breathing.  There are several different tongue procedures available.  Selection of a tongue base procedure depends on characteristics seen on physical exam.  Generally, procedures are aimed at removing bulky tissues in this area or preventing the back of the tongue from falling back during sleep.  Success rates for tongue surgery range from 50-62%3.    Hypoglossal Nerve Stimulation:  Hypoglossal nerve stimulation has recently received approval from the [place] Food and Drug Administration for the treatment of obstructive sleep apnea.  This is based on research showing that the system was safe and effective in treating sleep apnea6.  Results showed that the median AHI score decreased 68%, from 29.3 to 9.0. This therapy uses an implant system that senses breathing patterns and delivers mild stimulation to  airway muscles, which keeps the airway open during sleep.  The system consists of three fully implanted components: a small generator (similar in size to a pacemaker), a breathing sensor, and a stimulation lead.  Using a small handheld remote, a patient turns the therapy on before bed and off upon awakening.    Candidates for this device must be greater than 18 years of age, have moderate to severe LOUISA (AHI between 15-65), BMI less than 35, have tried CPAP/oral appliance for at least 8 weeks without success, and have appropriate upper airway anatomy (determined by a sleep endoscopy performed by Dr. Darinel Gerardo).    Hypoglossal Nerve Stimulation Pathway:    The sleep surgeon s office will work with the patient through the insurance prior-authorization process (including communications and appeals).    Nasal Procedures:  Nasal obstruction can interfere with nasal breathing during the day and night.  Studies have shown that relief of nasal obstruction can improve the ability of some patients to tolerate positive airway pressure therapy for obstructive sleep apnea1.  Treatment options include medications such as nasal saline, topical corticosteroid and antihistamine sprays, and oral medications such as antihistamines or decongestants. Non-surgical treatments can include external nasal dilators for selected patients. If these are not successful by themselves, surgery can improve the nasal airway either alone or in combination with these other options.      Combination Procedures:  Combination of surgical procedures and other treatments may be recommended, particularly if patients have more than one area of narrowing or persistent positional disease.  The success rate of combination surgery ranges from 66-80%2,3.    References  Freya GRIMALDO. The Role of the Nose in Snoring and Obstructive Sleep Apnoea: An Update.  Eur Arch Otorhinolaryngol. 2011; 268: 1365-73.   Brynn SM; Joaquin WESTBROOK; Negro ZAMORA; Pallanch JF; Johnny MB;  Dilip FRANCOIS; Zeb AGUILERA. Surgical modifications of the upper airway for obstructive sleep apnea in adults: a systematic review and meta-analysis. SLEEP 2010;33(10):8470-6354. Adebayo CAMARILLO. Hypopharyngeal surgery in obstructive sleep apnea: an evidence-based medicine review.  Arch Otolaryngol Head Neck Surg. 2006 Feb;132(2):206-13.  Niraj YH1, Mary Y, Monico MARJAN. The efficacy of anatomically based multilevel surgery for obstructive sleep apnea. Otolaryngol Head Neck Surg. 2003 Oct;129(4):327-35.  Adebayo CAMARILLO, Goldberg A. Hypopharyngeal Surgery in Obstructive Sleep Apnea: An Evidence-Based Medicine Review. Arch Otolaryngol Head Neck Surg. 2006 Feb;132(2):206-13.  Kay MADRIGAL et al. Upper-Airway Stimulation for Obstructive Sleep Apnea.  N Engl J Med. 2014 Jan 9;370(2):139-49.  Storm Y et al. Increased Incidence of Cardiovascular Disease in Middle-aged Men with Obstructive Sleep Apnea. Am J Respir Crit Care Med; 2002 166: 159-165  Pettit EM et al. Studying Life Effects and Effectiveness of Palatopharyngoplasty (SLEEP) study: Subjective Outcomes of Isolated Uvulopalatopharyngoplasty. Otolaryngol Head Neck Surg. 2011; 144: 623-631.        WHAT IF I ONLY HAVE SNORING?    Mandibular advancement devices, lateral sleep positioning, long-term weight loss and treatment of nasal allergies have been shown to improve snoring.  Exercising tongue muscles with a game (https://apps.[employer]/us/sal/soundly-reduce-snoring/dk5195961343) or stimulating the tongue during the day with a device (https://doi.org/10.3390/cyg43365174) have improved snoring in some individuals.    Remember to Drive Safe... Drive Alive     Sleep health profoundly affects your health, mood, and your safety.  Thirty three percent of the population (one in three of us) is not getting enough sleep and many have a sleep disorder. Not getting enough sleep or having an untreated / undertreated sleep condition may make us sleepy without even knowing it. In fact, our driving could  be dramatically impaired due to our sleep health. As your provider, here are some things I would like you to know about driving:     Here are some warning signs for impairment and dangerous drowsy driving:              -Having been awake more than 16 hours               -Looking tired               -Eyelid drooping              -Head nodding (it could be too late at this point)              -Driving for more than 30 minutes     Some things you could do to make the driving safer if you are experiencing some drowsiness:              -Stop driving and rest              -Call for transportation              -Make sure your sleep disorder is adequately treated     Some things that have been shown NOT to work when experiencing drowsiness while driving:              -Turning on the radio              -Opening windows              -Eating any  distracting  /  entertaining  foods (e.g., sunflower seeds, candy, or any other)              -Talking on the phone      Your decision may not only impact your life, but also the life of others. Please, remember to drive safe for yourself and all of us.

## 2023-12-05 RX ORDER — HYDROXYZINE HYDROCHLORIDE 25 MG/1
25 TABLET, FILM COATED ORAL
Qty: 90 TABLET | Refills: 0 | Status: SHIPPED | OUTPATIENT
Start: 2023-12-05 | End: 2024-03-07 | Stop reason: SINTOL

## 2024-02-05 ASSESSMENT — SLEEP AND FATIGUE QUESTIONNAIRES
HOW LIKELY ARE YOU TO NOD OFF OR FALL ASLEEP WHILE SITTING INACTIVE IN A PUBLIC PLACE: SLIGHT CHANCE OF DOZING
HOW LIKELY ARE YOU TO NOD OFF OR FALL ASLEEP WHEN YOU ARE A PASSENGER IN A CAR FOR AN HOUR WITHOUT A BREAK: MODERATE CHANCE OF DOZING
HOW LIKELY ARE YOU TO NOD OFF OR FALL ASLEEP WHILE SITTING AND READING: SLIGHT CHANCE OF DOZING
HOW LIKELY ARE YOU TO NOD OFF OR FALL ASLEEP WHILE WATCHING TV: SLIGHT CHANCE OF DOZING
HOW LIKELY ARE YOU TO NOD OFF OR FALL ASLEEP WHILE LYING DOWN TO REST IN THE AFTERNOON WHEN CIRCUMSTANCES PERMIT: SLIGHT CHANCE OF DOZING
HOW LIKELY ARE YOU TO NOD OFF OR FALL ASLEEP WHILE SITTING QUIETLY AFTER LUNCH WITHOUT ALCOHOL: MODERATE CHANCE OF DOZING
HOW LIKELY ARE YOU TO NOD OFF OR FALL ASLEEP IN A CAR, WHILE STOPPED FOR A FEW MINUTES IN TRAFFIC: SLIGHT CHANCE OF DOZING
HOW LIKELY ARE YOU TO NOD OFF OR FALL ASLEEP WHILE SITTING AND TALKING TO SOMEONE: WOULD NEVER DOZE

## 2024-02-08 ENCOUNTER — OFFICE VISIT (OUTPATIENT)
Dept: SLEEP MEDICINE | Facility: CLINIC | Age: 54
End: 2024-02-08
Attending: NURSE PRACTITIONER
Payer: COMMERCIAL

## 2024-02-08 VITALS — WEIGHT: 230 LBS | HEIGHT: 63 IN | BODY MASS INDEX: 40.75 KG/M2

## 2024-02-08 DIAGNOSIS — G47.30 SLEEP-RELATED BREATHING DISORDER: ICD-10-CM

## 2024-02-08 PROCEDURE — G0399 HOME SLEEP TEST/TYPE 3 PORTA: HCPCS | Performed by: INTERNAL MEDICINE

## 2024-02-08 NOTE — PROGRESS NOTES
Pt is completing a home sleep test. Pt was instructed on how to put on the Noxturnal T3 device and associated equipment before going to bed and given the opportunity to practice putting it on before leaving the sleep center. Pt was reminded to bring the home sleep test kit back to the center tomorrow, at agreed upon time for download and reporting.   Neck circumference: 39 CM / 15.75 inches.

## 2024-02-09 ENCOUNTER — MYC MEDICAL ADVICE (OUTPATIENT)
Dept: SLEEP MEDICINE | Facility: CLINIC | Age: 54
End: 2024-02-09

## 2024-02-09 ENCOUNTER — DOCUMENTATION ONLY (OUTPATIENT)
Dept: SLEEP MEDICINE | Facility: CLINIC | Age: 54
End: 2024-02-09
Payer: COMMERCIAL

## 2024-02-09 NOTE — PROGRESS NOTES
This HSAT was performed using a Noxturnal T3 device which recorded snore, sound, movement activity, body position, nasal pressure, oronasal thermal airflow, pulse, oximetry and both chest and abdominal respiratory effort. HSAT data was restricted to the time patient states they were in bed.     HSAT was scored using 1B 4% hypopnea rule.     HST AHI (Non-PAT): 23.2  Snoring was reported as loud.  Time with SpO2 below 89% was 63.4 minutes.   Overall signal quality was good     Pt will follow up with sleep provider to determine appropriate therapy.

## 2024-02-09 NOTE — PROGRESS NOTES
HST POST-STUDY QUESTIONNAIRE    What time did you go to bed?  10:10 pm   How long do you think it took to fall asleep?  1 hour  What time did you wake up to start the day?  6 am   Did you get up during the night at all?  no  If you woke up, do you remember approximately what time(s)? None   Did you have any difficulty with the equipment?  No  Did you us any type of treatment with this study?  None  Was the head of the bed elevated? No  Did you sleep in a recliner?  No  Did you stop using CPAP at least 3 days before this test?  NA  Any other information you'd like us to know? NA

## 2024-02-09 NOTE — PROGRESS NOTES
Pt returned HST device. It was downloaded and forwarded data to the clinical specialist for scoring.         Renu Saavedra

## 2024-02-12 NOTE — PROCEDURES
"HOME SLEEP STUDY INTERPRETATION    Patient: Justyna Zhang  MRN: [id number]  YOB: 1970  Study Date: 2/8/2024  Referring Provider: Isa David PA-C  Ordering Provider: Lizette CURTIS CNP     Indications for Home Study: Justyna Zhang is a 53 year old female who presents with symptoms suggestive of obstructive sleep apnea.    Estimated body mass index is 40.74 kg/m  as calculated from the following:    Height as of this encounter: 1.6 m (5' 3\").    Weight as of this encounter: 104.3 kg (230 lb).  Total score - [place]: 9 (2/5/2024  7:35 PM)  Total Score: 5 (2/5/2024  7:36 PM)    Data: A full night home sleep study was performed recording the standard physiologic parameters including body position, movement, sound, nasal pressure, thermal oral airflow, chest and abdominal movements with respiratory inductance plethysmography, and oxygen saturation by pulse oximetry. Pulse rate was estimated by oximetry recording. This study was considered adequate based on > 4 hours of quality oximetry and respiratory recording. As specified by the AASM Manual for the Scoring of Sleep and Associated events, version 2.3, Rule VIII.D 1B, 4% oxygen desaturation scoring for hypopneas is used as a standard of care on all home sleep apnea testing.    Analysis Time:  395.7 minutes    Respiration:   Sleep Associated Hypoxemia: sustained hypoxemia was present. Baseline oxygen saturation was 92%.  Time with saturation less than or equal to 88% was 43.7 minutes. The lowest oxygen saturation was 74%.   Snoring: Snoring was present.  Respiratory events: The home study revealed a presence of 9 obstructive apneas and 0 mixed and central apneas. There were 144 hypopneas resulting in a combined apnea/hypopnea index [AHI] of 23.2 events per hour.  AHI was 25.6 per hour supine, N/A per hour prone, 19.1 per hour on left side, and 21.8 per hour on right side.   Pattern: Excluding events noted above, respiratory " rate and pattern was Normal.    Position: Percent of time spent: supine - 54.6%, prone - 0%, on left - 24.6%, on right - 20.8%.    Heart Rate: By pulse oximetry normal rate was noted.     Assessment:   Moderate obstructive sleep apnea.  Sleep associated hypoxemia was present.    Recommendations:  Consider auto-CPAP at 5-20 cmH2O or polysomnography with full night PAP titration.  Suggest optimizing sleep hygiene and avoiding sleep deprivation.  Weight management.    Diagnosis Code(s): Obstructive Sleep Apnea G47.33, Hypoxemia G47.36    Garth Benson DO, February 12, 2024   Diplomate, American Board of Internal Medicine, Sleep Medicine

## 2024-03-06 ENCOUNTER — MYC REFILL (OUTPATIENT)
Dept: FAMILY MEDICINE | Facility: CLINIC | Age: 54
End: 2024-03-06
Payer: COMMERCIAL

## 2024-03-06 DIAGNOSIS — N95.1 MENOPAUSAL SYNDROME (HOT FLASHES): ICD-10-CM

## 2024-03-07 RX ORDER — NORETHINDRONE ACETATE AND ETHINYL ESTRADIOL .5; 2.5 MG/1; UG/1
1 TABLET ORAL DAILY
Qty: 90 TABLET | Refills: 0 | Status: SHIPPED | OUTPATIENT
Start: 2024-03-07 | End: 2024-04-08

## 2024-03-07 NOTE — TELEPHONE ENCOUNTER
Routing refill request to provider for review/approval because:  Drug interaction warning  Last Written Prescription Date:  9/11/23  Last Fill Quantity: 90,  # refills: 1   Last office visit: 7/7/2023 ; last virtual visit: Visit date not found with prescribing provider:     Future Office Visit:  none    Julie Behrendt RN

## 2024-04-08 ENCOUNTER — MYC MEDICAL ADVICE (OUTPATIENT)
Dept: FAMILY MEDICINE | Facility: CLINIC | Age: 54
End: 2024-04-08
Payer: COMMERCIAL

## 2024-04-08 DIAGNOSIS — N95.1 MENOPAUSAL SYNDROME (HOT FLASHES): Primary | ICD-10-CM

## 2024-04-10 RX ORDER — NORETHINDRONE ACETATE AND ETHINYL ESTRADIOL 1; 5 MG/1; UG/1
1 TABLET ORAL DAILY
Qty: 90 TABLET | Refills: 1 | Status: SHIPPED | OUTPATIENT
Start: 2024-04-10 | End: 2024-05-31 | Stop reason: ALTCHOICE

## 2024-05-06 ENCOUNTER — MYC REFILL (OUTPATIENT)
Dept: FAMILY MEDICINE | Facility: CLINIC | Age: 54
End: 2024-05-06
Payer: COMMERCIAL

## 2024-05-06 DIAGNOSIS — Z91.09 MULTIPLE ENVIRONMENTAL ALLERGIES: ICD-10-CM

## 2024-05-07 ENCOUNTER — PATIENT OUTREACH (OUTPATIENT)
Dept: CARE COORDINATION | Facility: CLINIC | Age: 54
End: 2024-05-07
Payer: COMMERCIAL

## 2024-05-07 RX ORDER — LEVOCETIRIZINE DIHYDROCHLORIDE 5 MG/1
5 TABLET, FILM COATED ORAL EVERY EVENING
Qty: 90 TABLET | Refills: 1 | Status: SHIPPED | OUTPATIENT
Start: 2024-05-07

## 2024-05-13 ASSESSMENT — SLEEP AND FATIGUE QUESTIONNAIRES
HOW LIKELY ARE YOU TO NOD OFF OR FALL ASLEEP WHILE SITTING QUIETLY AFTER LUNCH WITHOUT ALCOHOL: SLIGHT CHANCE OF DOZING
HOW LIKELY ARE YOU TO NOD OFF OR FALL ASLEEP WHILE SITTING INACTIVE IN A PUBLIC PLACE: SLIGHT CHANCE OF DOZING
HOW LIKELY ARE YOU TO NOD OFF OR FALL ASLEEP IN A CAR, WHILE STOPPED FOR A FEW MINUTES IN TRAFFIC: SLIGHT CHANCE OF DOZING
HOW LIKELY ARE YOU TO NOD OFF OR FALL ASLEEP WHILE SITTING AND TALKING TO SOMEONE: WOULD NEVER DOZE
HOW LIKELY ARE YOU TO NOD OFF OR FALL ASLEEP WHILE WATCHING TV: MODERATE CHANCE OF DOZING
HOW LIKELY ARE YOU TO NOD OFF OR FALL ASLEEP WHEN YOU ARE A PASSENGER IN A CAR FOR AN HOUR WITHOUT A BREAK: MODERATE CHANCE OF DOZING
HOW LIKELY ARE YOU TO NOD OFF OR FALL ASLEEP WHILE SITTING AND READING: SLIGHT CHANCE OF DOZING
HOW LIKELY ARE YOU TO NOD OFF OR FALL ASLEEP WHILE LYING DOWN TO REST IN THE AFTERNOON WHEN CIRCUMSTANCES PERMIT: SLIGHT CHANCE OF DOZING

## 2024-05-16 ENCOUNTER — VIRTUAL VISIT (OUTPATIENT)
Dept: SLEEP MEDICINE | Facility: CLINIC | Age: 54
End: 2024-05-16
Payer: COMMERCIAL

## 2024-05-16 VITALS — WEIGHT: 230 LBS | BODY MASS INDEX: 40.75 KG/M2 | HEIGHT: 63 IN

## 2024-05-16 DIAGNOSIS — G47.33 OSA (OBSTRUCTIVE SLEEP APNEA): Primary | ICD-10-CM

## 2024-05-16 DIAGNOSIS — E66.01 OBESITY, CLASS III, BMI 40-49.9 (MORBID OBESITY) (H): ICD-10-CM

## 2024-05-16 DIAGNOSIS — J31.0 CHRONIC RHINITIS: ICD-10-CM

## 2024-05-16 PROCEDURE — 99213 OFFICE O/P EST LOW 20 MIN: CPT | Mod: 95 | Performed by: NURSE PRACTITIONER

## 2024-05-16 ASSESSMENT — PAIN SCALES - GENERAL: PAINLEVEL: NO PAIN (0)

## 2024-05-16 NOTE — PROGRESS NOTES
"sss  Chief Complaint   Patient presents with    RECHECK       Justyna Zhang is a 53 year old female who returns to [facility] for review of sleep testing results. She presented with symptoms suggestive of obstructive sleep apnea.  Justyna presented for a formal intake assessment and sleep medicine on 11/30/2023.  She had symptoms of socially disruptive snoring, witnessed episodes of apnea, morning headaches.  Home sleep test was ordered and marked results are as follows.    Of note, Jen has a past medical history notable for fibromyalgia, chronic rhinitis, class III obesity, hypertriglyceridemia.    Estimated body mass index is 40.74 kg/m  as calculated from the following:    Height as of this encounter: 1.6 m (5' 3\").    Weight as of this encounter: 104.3 kg (230 lb).  Total score - [place]: 9 (2/5/2024  7:35 PM)  Total Score: 5 (2/5/2024  7:36 PM)    Home Sleep Apnea Testing -  2/8/2024    Analysis Time:  395.7 minutes     Respiration:   Sleep Associated Hypoxemia: sustained hypoxemia was present. Baseline oxygen saturation was 92%.  Time with saturation less than or equal to 88% was 43.7 minutes. The lowest oxygen saturation was 74%.   Snoring: Snoring was present.  Respiratory events: The home study revealed a presence of 9 obstructive apneas and 0 mixed and central apneas. There were 144 hypopneas resulting in a combined apnea/hypopnea index [AHI] of 23.2 events per hour.  AHI was 25.6 per hour supine, N/A per hour prone, 19.1 per hour on left side, and 21.8 per hour on right side.   Pattern: Excluding events noted above, respiratory rate and pattern was Normal.     Position: Percent of time spent: supine - 54.6%, prone - 0%, on left - 24.6%, on right - 20.8%.     Heart Rate: By pulse oximetry normal rate was noted.     Results were reviewed in detail today with Justyna and a copy given to her for her records.    Reviewed by team:  Tobacco  Allergies  Meds  Problems  Med " "Hx  Surg Hx  Fam Hx        Reviewed by provider:  Tobacco  Allergies  Meds  Problems  Med Hx  Surg Hx  Fam Hx         Patient was provided with a prescription for hydroxyzine, 25 mg p.o. to be used for nighttime anxiety.  She reported today that this exacerbated her depression.    Problem List:  Patient Active Problem List    Diagnosis Date Noted    Obesity, Class III, BMI 40-49.9 (morbid obesity) (H) 11/30/2023     Priority: Medium    Chronic rhinitis 04/05/2017     Priority: Medium    Fibromyalgia 08/07/2009     Priority: Medium    Environmental allergies 08/07/2009     Priority: Medium     Overview:   Reacted to allergy injections, was advised to carry epipen for environmental allergies  Reacted to allergy injections, was advised to carry epipen for environmental allergies          Ht 1.6 m (5' 3\")   Wt 104.3 kg (230 lb)   LMP 03/18/2021 (Approximate)   BMI 40.74 kg/m      Impression/Plan:    ICD-10-CM    1. LOUISA (obstructive sleep apnea)  G47.33 Sleep Comprehensive DME      2. Obesity, Class III, BMI 40-49.9 (morbid obesity) (H)  E66.01 Sleep Comprehensive DME      3. Chronic rhinitis  J31.0 Sleep Comprehensive DME          Assessment:   Moderate obstructive sleep apnea.  Sleep associated hypoxemia was present.     Recommendations:  Consider auto-CPAP at 5-20 cmH2O or polysomnography with full night PAP titration.  Suggest optimizing sleep hygiene and avoiding sleep deprivation.  Weight management.     Diagnosis Code(s):   Obstructive Sleep Apnea G47.33,   Hypoxemia G47.36      Comorbidities:  Fibromyalgia  Class III obesity  Allergic rhinitis    20 minutes spent with patient, all of which were spent face-to-face counseling, consulting, coordinating plan of care, documentation.      EVER Mcdonough CNP    CC:  Judy David,   "

## 2024-05-16 NOTE — PROGRESS NOTES
Virtual Visit Details    Type of service:  Video Visit 8:30 AM- 8:51 AM     Originating Location (pt. Location): Home    Distant Location (provider location):  Off-site  Platform used for Video Visit: Well

## 2024-05-16 NOTE — PATIENT INSTRUCTIONS
Drive Safe... Drive Alive     Sleep health profoundly affects your health, mood, and your safety. 33% of the population (one in three of us) is not getting enough sleep and many have a sleep disorder. Not getting enough sleep or having an untreated / undertreated sleep condition may make us sleepy without even knowing it. In fact, our driving could be dramatically impaired due to our sleep health. As your provider, here are some things I would like you to know about driving:     Here are some warning signs for impairment and dangerous drowsy driving:              -Having been awake more than 16 hours               -Looking tired               -Eyelid drooping              -Head nodding (it could be too late at this point)              -Driving for more than 30 minutes     Some things you could do to make the driving safer if you are experiencing some drowsiness:              -Stop driving and rest              -Call for transportation              -Make sure your sleep disorder is adequately treated     Some things that have been shown NOT to work when experiencing drowsiness while driving:              -Turning on the radio              -Opening windows              -Eating any  distracting  /  entertaining  foods (e.g., sunflower seeds, candy, or any other)              -Talking on the phone      Your decision may not only impact your life, but also the life of others. Please, remember to drive safe for yourself and all of us.   Your There is no height or weight on file to calculate BMI.  Weight management is a personal decision.  If you are interested in exploring weight loss strategies, the following discussion covers the approaches that may be successful. Body mass index (BMI) is one way to tell whether you are at a healthy weight, overweight, or obese. It measures your weight in relation to your height.  A BMI of 18.5 to 24.9 is in the healthy range. A person with a BMI of 25 to 29.9 is considered overweight,  and someone with a BMI of 30 or greater is considered obese. More than two-thirds of American adults are considered overweight or obese.  Being overweight or obese increases the risk for further weight gain. Excess weight may lead to heart disease and diabetes.  Creating and following plans for healthy eating and physical activity may help you improve your health.  Weight control is part of healthy lifestyle and includes exercise, emotional health, and healthy eating habits. Careful eating habits lifelong are the mainstay of weight control. Though there are significant health benefits from weight loss, long-term weight loss with diet alone may be very difficult to achieve- studies show long-term success with dietary management in less than 10% of people. Attaining a healthy weight may be especially difficult to achieve in those with severe obesity. In some cases, medications, devices and surgical management might be considered.  What can you do?  If you are overweight or obese and are interested in methods for weight loss, you should discuss this with your provider.   Consider reducing daily calorie intake by 500 calories.   Keep a food journal.   Avoiding skipping meals, consider cutting portions instead.    Diet combined with exercise helps maintain muscle while optimizing fat loss. Strength training is particularly important for building and maintaining muscle mass. Exercise helps reduce stress, increase energy, and improves fitness. Increasing exercise without diet control, however, may not burn enough calories to loose weight.     Start walking three days a week 10-20 minutes at a time  Work towards walking thirty minutes five days a week   Eventually, increase the speed of your walking for 1-2 minutes at time    In addition, we recommend that you review healthy lifestyles and methods for weight loss available through the National Institutes of Health patient information  sites:  http://win.niddk.nih.gov/publications/index.htm    And look into health and wellness programs that may be available through your health insurance provider, employer, local community center, or [employer].

## 2024-05-16 NOTE — NURSING NOTE
Is the patient currently in the state of [place]? YES    Visit mode:VIDEO    If the visit is dropped, the patient can be reconnected by: VIDEO VISIT: Text to cell phone:   Telephone Information:   Mobile [phone]       Will anyone else be joining the visit? NO  (If patient encounters technical issues they should call [phone] :[phone])    How would you like to obtain your AVS? MyChart    Are changes needed to the allergy or medication list? No    Are refills needed on medications prescribed by this physician? NO    Reason for visit: RECHECK    Kurtis HOWELL

## 2024-05-28 SDOH — HEALTH STABILITY: PHYSICAL HEALTH: ON AVERAGE, HOW MANY MINUTES DO YOU ENGAGE IN EXERCISE AT THIS LEVEL?: 10 MIN

## 2024-05-28 SDOH — HEALTH STABILITY: PHYSICAL HEALTH: ON AVERAGE, HOW MANY DAYS PER WEEK DO YOU ENGAGE IN MODERATE TO STRENUOUS EXERCISE (LIKE A BRISK WALK)?: 1 DAY

## 2024-05-28 ASSESSMENT — SOCIAL DETERMINANTS OF HEALTH (SDOH): HOW OFTEN DO YOU GET TOGETHER WITH FRIENDS OR RELATIVES?: ONCE A WEEK

## 2024-05-31 ENCOUNTER — OFFICE VISIT (OUTPATIENT)
Dept: FAMILY MEDICINE | Facility: CLINIC | Age: 54
End: 2024-05-31
Payer: COMMERCIAL

## 2024-05-31 ENCOUNTER — ANCILLARY PROCEDURE (OUTPATIENT)
Dept: GENERAL RADIOLOGY | Facility: CLINIC | Age: 54
End: 2024-05-31
Attending: PHYSICIAN ASSISTANT
Payer: COMMERCIAL

## 2024-05-31 VITALS
WEIGHT: 231.5 LBS | HEART RATE: 91 BPM | SYSTOLIC BLOOD PRESSURE: 106 MMHG | TEMPERATURE: 97.6 F | HEIGHT: 63 IN | OXYGEN SATURATION: 95 % | BODY MASS INDEX: 41.02 KG/M2 | DIASTOLIC BLOOD PRESSURE: 78 MMHG | RESPIRATION RATE: 16 BRPM

## 2024-05-31 DIAGNOSIS — Z13.220 SCREENING FOR LIPOID DISORDERS: ICD-10-CM

## 2024-05-31 DIAGNOSIS — Z00.00 ROUTINE GENERAL MEDICAL EXAMINATION AT A HEALTH CARE FACILITY: Primary | ICD-10-CM

## 2024-05-31 DIAGNOSIS — N95.1 MENOPAUSAL SYNDROME (HOT FLASHES): ICD-10-CM

## 2024-05-31 DIAGNOSIS — M79.89 LEG SWELLING: ICD-10-CM

## 2024-05-31 DIAGNOSIS — R07.89 OTHER CHEST PAIN: ICD-10-CM

## 2024-05-31 DIAGNOSIS — K21.9 GASTROESOPHAGEAL REFLUX DISEASE WITHOUT ESOPHAGITIS: ICD-10-CM

## 2024-05-31 DIAGNOSIS — Z91.09 MULTIPLE ENVIRONMENTAL ALLERGIES: ICD-10-CM

## 2024-05-31 LAB
ALBUMIN SERPL BCG-MCNC: 4.2 G/DL (ref 3.5–5.2)
ALP SERPL-CCNC: 61 U/L (ref 40–150)
ALT SERPL W P-5'-P-CCNC: 17 U/L (ref 0–50)
ANION GAP SERPL CALCULATED.3IONS-SCNC: 17 MMOL/L (ref 7–15)
AST SERPL W P-5'-P-CCNC: 24 U/L (ref 0–45)
BASOPHILS # BLD AUTO: 0 10E3/UL (ref 0–0.2)
BASOPHILS NFR BLD AUTO: 0 %
BILIRUB SERPL-MCNC: 0.4 MG/DL
BUN SERPL-MCNC: 13.8 MG/DL (ref 6–20)
CALCIUM SERPL-MCNC: 8.9 MG/DL (ref 8.6–10)
CHLORIDE SERPL-SCNC: 105 MMOL/L (ref 98–107)
CHOLEST SERPL-MCNC: 148 MG/DL
CREAT SERPL-MCNC: 0.75 MG/DL (ref 0.51–0.95)
DEPRECATED HCO3 PLAS-SCNC: 17 MMOL/L (ref 22–29)
EGFRCR SERPLBLD CKD-EPI 2021: >90 ML/MIN/1.73M2
EOSINOPHIL # BLD AUTO: 0.1 10E3/UL (ref 0–0.7)
EOSINOPHIL NFR BLD AUTO: 2 %
ERYTHROCYTE [DISTWIDTH] IN BLOOD BY AUTOMATED COUNT: 12.4 % (ref 10–15)
GLUCOSE SERPL-MCNC: 94 MG/DL (ref 70–99)
HCT VFR BLD AUTO: 42.5 % (ref 35–47)
HDLC SERPL-MCNC: 42 MG/DL
HGB BLD-MCNC: 14.7 G/DL (ref 11.7–15.7)
IMM GRANULOCYTES # BLD: 0 10E3/UL
IMM GRANULOCYTES NFR BLD: 0 %
LDLC SERPL CALC-MCNC: 76 MG/DL
LYMPHOCYTES # BLD AUTO: 1.7 10E3/UL (ref 0.8–5.3)
LYMPHOCYTES NFR BLD AUTO: 31 %
MCH RBC QN AUTO: 29.8 PG (ref 26.5–33)
MCHC RBC AUTO-ENTMCNC: 34.6 G/DL (ref 31.5–36.5)
MCV RBC AUTO: 86 FL (ref 78–100)
MONOCYTES # BLD AUTO: 0.4 10E3/UL (ref 0–1.3)
MONOCYTES NFR BLD AUTO: 7 %
NEUTROPHILS # BLD AUTO: 3.2 10E3/UL (ref 1.6–8.3)
NEUTROPHILS NFR BLD AUTO: 59 %
NONHDLC SERPL-MCNC: 106 MG/DL
PLATELET # BLD AUTO: 198 10E3/UL (ref 150–450)
POTASSIUM SERPL-SCNC: 3.9 MMOL/L (ref 3.4–5.3)
PROT SERPL-MCNC: 7.3 G/DL (ref 6.4–8.3)
RBC # BLD AUTO: 4.94 10E6/UL (ref 3.8–5.2)
SODIUM SERPL-SCNC: 139 MMOL/L (ref 135–145)
TRIGL SERPL-MCNC: 149 MG/DL
TSH SERPL DL<=0.005 MIU/L-ACNC: 2.65 UIU/ML (ref 0.3–4.2)
WBC # BLD AUTO: 5.3 10E3/UL (ref 4–11)

## 2024-05-31 PROCEDURE — 36415 COLL VENOUS BLD VENIPUNCTURE: CPT | Performed by: PHYSICIAN ASSISTANT

## 2024-05-31 PROCEDURE — 71046 X-RAY EXAM CHEST 2 VIEWS: CPT | Mod: TC | Performed by: RADIOLOGY

## 2024-05-31 PROCEDURE — 84443 ASSAY THYROID STIM HORMONE: CPT | Performed by: PHYSICIAN ASSISTANT

## 2024-05-31 PROCEDURE — 85025 COMPLETE CBC W/AUTO DIFF WBC: CPT | Performed by: PHYSICIAN ASSISTANT

## 2024-05-31 PROCEDURE — 93000 ELECTROCARDIOGRAM COMPLETE: CPT | Performed by: PHYSICIAN ASSISTANT

## 2024-05-31 PROCEDURE — 99214 OFFICE O/P EST MOD 30 MIN: CPT | Mod: 25 | Performed by: PHYSICIAN ASSISTANT

## 2024-05-31 PROCEDURE — 80053 COMPREHEN METABOLIC PANEL: CPT | Performed by: PHYSICIAN ASSISTANT

## 2024-05-31 PROCEDURE — 99396 PREV VISIT EST AGE 40-64: CPT | Performed by: PHYSICIAN ASSISTANT

## 2024-05-31 PROCEDURE — 80061 LIPID PANEL: CPT | Performed by: PHYSICIAN ASSISTANT

## 2024-05-31 RX ORDER — ALBUTEROL SULFATE 90 UG/1
2 AEROSOL, METERED RESPIRATORY (INHALATION) EVERY 6 HOURS PRN
Qty: 18 G | Refills: 0 | Status: SHIPPED | OUTPATIENT
Start: 2024-05-31

## 2024-05-31 RX ORDER — EPINEPHRINE 0.3 MG/.3ML
0.3 INJECTION SUBCUTANEOUS PRN
Qty: 1 EACH | Refills: 1 | Status: SHIPPED | OUTPATIENT
Start: 2024-05-31 | End: 2024-10-01

## 2024-05-31 RX ORDER — FUROSEMIDE 20 MG
20 TABLET ORAL DAILY PRN
Qty: 90 TABLET | Refills: 1 | Status: SHIPPED | OUTPATIENT
Start: 2024-05-31

## 2024-05-31 RX ORDER — NORETHINDRONE ACETATE AND ETHINYL ESTRADIOL .5; 2.5 MG/1; UG/1
1 TABLET ORAL DAILY
Qty: 90 TABLET | Refills: 1 | Status: SHIPPED | OUTPATIENT
Start: 2024-05-31

## 2024-05-31 RX ORDER — OMEPRAZOLE 10 MG/1
10 CAPSULE, DELAYED RELEASE ORAL DAILY
Qty: 90 CAPSULE | Refills: 3 | Status: SHIPPED | OUTPATIENT
Start: 2024-05-31

## 2024-05-31 ASSESSMENT — PAIN SCALES - GENERAL: PAINLEVEL: MODERATE PAIN (4)

## 2024-05-31 NOTE — PROGRESS NOTES
Preventive Care Visit  [facility] PATRICIA David PA-C, Family Medicine  May 31, 2024      Assessment & Plan     Routine general medical examination at a health care facility  Patient declines vaccinations today.    Other chest pain  Primary concern is several episodes of chest pain. One in particular sounded anginal. Will start with labwork, EKG, CXR. Recommended stress test and cardiology consult.   Red flag signs to be seen urgently were discussed.  - XR Chest 2 Views; Future  - EKG 12-lead complete w/read - Clinics  - Comprehensive metabolic panel (BMP + Alb, Alk Phos, ALT, AST, Total. Bili, TP); Future  - TSH with free T4 reflex; Future  - CBC with platelets and differential; Future  - Echocardiogram Exercise Stress; Future  - Adult Cardiology Eval  Referral; Future  - Comprehensive metabolic panel (BMP + Alb, Alk Phos, ALT, AST, Total. Bili, TP)  - TSH with free T4 reflex  - CBC with platelets and differential    Gastroesophageal reflux disease without esophagitis  Stable/controlled with omeprazole. Discussed eventual trial of discontinuation / possible EGD. Defer today.  - omeprazole (PRILOSEC) 10 MG DR capsule; Take 1 capsule (10 mg) by mouth daily    Leg swelling  PRN lasix for swelling. In the spring/summer needs to use more regularly. Check CMP today.  - furosemide (LASIX) 20 MG tablet; Take 1 tablet (20 mg) by mouth daily as needed (edema)    Menopausal syndrome (hot flashes)  She felt the previous prescription worked better for her. Will trial this again. Discussed risks/benefits.  - norethindrone-eth estradiol (FEMHRT LOW DOSE) 0.5-2.5 MG-MCG tablet; Take 1 tablet by mouth daily    Multiple environmental allergies  Refills, stable.  - albuterol (PROAIR HFA/PROVENTIL HFA/VENTOLIN HFA) 108 (90 Base) MCG/ACT inhaler; Inhale 2 puffs into the lungs every 6 hours as needed for shortness of breath or wheezing  - EPINEPHrine (ANY BX GENERIC EQUIV) 0.3 MG/0.3ML injection 2-pack;  "Inject 0.3 mLs (0.3 mg) into the muscle as needed for anaphylaxis    Screening for lipoid disorders  - Lipid panel reflex to direct LDL Fasting; Future  - Lipid panel reflex to direct LDL Fasting            BMI  Estimated body mass index is 41.01 kg/m  as calculated from the following:    Height as of this encounter: 1.6 m (5' 3\").    Weight as of this encounter: 105 kg (231 lb 8 oz).   Weight management plan: Discussed healthy diet and exercise guidelines    Counseling  Appropriate preventive services were discussed with this patient, including applicable screening as appropriate for fall prevention, nutrition, physical activity, Tobacco-use cessation, weight loss and cognition.  Checklist reviewing preventive services available has been given to the patient.  Reviewed patient's diet, addressing concerns and/or questions.   She is at risk for lack of exercise and has been provided with information to increase physical activity for the benefit of her well-being.     Wt Readings from Last 4 Encounters:   05/31/24 105 kg (231 lb 8 oz)   05/16/24 104.3 kg (230 lb)   02/08/24 104.3 kg (230 lb)   11/30/23 104.3 kg (230 lb)         Subjective   Justyna is a 53 year old, presenting for the following:  Physical        5/31/2024     8:22 AM   Additional Questions   Roomed by Kerrie Cartwright CMA        Health Care Directive  Patient does not have a Health Care Directive or Living Will: Discussed advance care planning with patient; information given to patient to review.    HPI      She has had several episodes of chest pain. Once while sitting on the couch. Another she remembers specifically while out walking - started with activity, and gradually resolved after she rested. She has noticed more dyspnea on exertion as well - particularly when walking up 1 flight of stairs she is winded.  No regular exercise/activity.  She denies palpitations, heart racing, lightheadedness.            5/28/2024   General Health   How would you rate " your overall physical health? Good   Feel stress (tense, anxious, or unable to sleep) Not at all         5/28/2024   Nutrition   Three or more servings of calcium each day? Yes   Diet: Regular (no restrictions)   How many servings of fruit and vegetables per day? (!) 0-1   How many sweetened beverages each day? 0-1         5/28/2024   Exercise   Days per week of moderate/strenous exercise 1 day   Average minutes spent exercising at this level 10 min   (!) EXERCISE CONCERN      5/28/2024   Social Factors   Frequency of gathering with friends or relatives Once a week   Worry food won't last until get money to buy more No   Food not last or not have enough money for food? No   Do you have housing?  Yes   Are you worried about losing your housing? No   Lack of transportation? No   Unable to get utilities (heat,electricity)? No         5/28/2024   Fall Risk   Fallen 2 or more times in the past year? No   Trouble with walking or balance? No          5/28/2024   Dental   Dentist two times every year? Yes         5/28/2024   TB Screening   Were you born outside of the [place]? No         Today's PHQ-2 Score:       5/30/2024     8:22 AM   PHQ-2 ( 1999 [employer])   Q1: Little interest or pleasure in doing things 0   Q2: Feeling down, depressed or hopeless 0   PHQ-2 Score 0   Q1: Little interest or pleasure in doing things Not at all   Q2: Feeling down, depressed or hopeless Not at all   PHQ-2 Score 0           5/28/2024   Substance Use   Alcohol more than 3/day or more than 7/wk No   Do you use any other substances recreationally? No     Social History     Tobacco Use    Smoking status: Never    Smokeless tobacco: Never   Substance Use Topics    Alcohol use: Yes     Comment: A couple drinks per week    Drug use: Never           6/6/2023   LAST FHS-7 RESULTS   1st degree relative breast or ovarian cancer Unknown   Any relative bilateral breast cancer Unknown   Any male have breast cancer Unknown   Any ONE woman have BOTH breast AND  ovarian cancer Unknown   Any woman with breast cancer before 50yrs Unknown   2 or more relatives with breast AND/OR ovarian cancer Unknown   2 or more relatives with breast AND/OR bowel cancer Unknown        Mammogram Screening - Mammogram every 1-2 years updated in Health Maintenance based on mutual decision making        5/28/2024   STI Screening   New sexual partner(s) since last STI/HIV test? No     History of abnormal Pap smear: No - age 30- 64 PAP with HPV every 5 years recommended        Latest Ref Rng & Units 12/4/2020    10:01 AM 12/4/2020     9:50 AM 4/5/2017     2:00 PM   PAP / HPV   PAP (Historical)  NIL   NIL    HPV 16 DNA NEG^Negative  Negative  Negative    HPV 18 DNA NEG^Negative  Negative  Negative    Other HR HPV NEG^Negative  Negative  Negative      ASCVD Risk   The 10-year ASCVD risk score (Harini FORRESTER, et al., 2019) is: 0.9%    Values used to calculate the score:      Age: 53 years      Sex: Female      Is Non- : No      Diabetic: No      Tobacco smoker: No      Systolic Blood Pressure: 106 mmHg      Is BP treated: No      HDL Cholesterol: 50 mg/dL      Total Cholesterol: 170 mg/dL           Reviewed and updated as needed this visit by Provider                    Past Medical History:   Diagnosis Date    Fibromyalgia     Uncomplicated asthma 2006    Weather and sports related     Past Surgical History:   Procedure Laterality Date    COLONOSCOPY N/A 09/19/2022    Procedure: COLONOSCOPY;  Surgeon: Angel Peace MD;  Location: [facility]    ENT SURGERY  1976    Several sets of ear tubes as a child    GENITOURINARY SURGERY  2004    Bladder sling put in twice     Lab work is in process  Labs reviewed in EPIC  BP Readings from Last 3 Encounters:   05/31/24 106/78   07/07/23 122/88   09/19/22 111/82    Wt Readings from Last 3 Encounters:   05/31/24 105 kg (231 lb 8 oz)   05/16/24 104.3 kg (230 lb)   02/08/24 104.3 kg (230 lb)                  Patient Active Problem List  "  Diagnosis    Chronic rhinitis    Fibromyalgia    Environmental allergies    Obesity, Class III, BMI 40-49.9 (morbid obesity) (H)     Past Surgical History:   Procedure Laterality Date    COLONOSCOPY N/A 09/19/2022    Procedure: COLONOSCOPY;  Surgeon: Angel Peace MD;  Location: [facility]    ENT SURGERY  1976    Several sets of ear tubes as a child    GENITOURINARY SURGERY  2004    Bladder sling put in twice       Social History     Tobacco Use    Smoking status: Never    Smokeless tobacco: Never   Substance Use Topics    Alcohol use: Yes     Comment: A couple drinks per week     Family History   Adopted: Yes   Problem Relation Age of Onset    Diabetes Mother     Hypertension Mother     Hyperlipidemia Mother     Asthma Mother     Obesity Mother     Unknown/Adopted Other              Review of Systems  CONSTITUTIONAL: NEGATIVE for fever, chills, change in weight  INTEGUMENTARY/SKIN: NEGATIVE for worrisome rashes, moles or lesions  EYES: NEGATIVE for vision changes or irritation  ENT/MOUTH: NEGATIVE for ear, mouth and throat problems  RESP: NEGATIVE for significant cough or SOB  BREAST: NEGATIVE for masses, tenderness or discharge  CV: NEGATIVE for chest pain, palpitations or peripheral edema  GI: NEGATIVE for nausea, abdominal pain, heartburn, or change in bowel habits  : NEGATIVE for frequency, dysuria, or hematuria  MUSCULOSKELETAL: NEGATIVE for significant arthralgias or myalgia  NEURO: NEGATIVE for weakness, dizziness or paresthesias  ENDOCRINE: NEGATIVE for temperature intolerance, skin/hair changes  HEME: NEGATIVE for bleeding problems  PSYCHIATRIC: NEGATIVE for changes in mood or affect     Objective    Exam  /78   Pulse 91   Temp 97.6  F (36.4  C) (Tympanic)   Resp 16   Ht 1.6 m (5' 3\")   Wt 105 kg (231 lb 8 oz)   LMP 03/18/2021 (Approximate)   SpO2 95%   BMI 41.01 kg/m     Estimated body mass index is 41.01 kg/m  as calculated from the following:    Height as of this encounter: 1.6 m (5' " "3\").    Weight as of this encounter: 105 kg (231 lb 8 oz).    Physical Exam  GENERAL: alert and no distress  EYES: Eyes grossly normal to inspection, PERRL and conjunctivae and sclerae normal  HENT: ear canals and TM's normal, nose and mouth without ulcers or lesions  NECK: no adenopathy, no asymmetry, masses, or scars  RESP: lungs clear to auscultation - no rales, rhonchi or wheezes  BREAST: normal without masses, tenderness or nipple discharge and no palpable axillary masses or adenopathy  CV: regular rate and rhythm, normal S1 S2, no S3 or S4, no murmur, click or rub, no peripheral edema  ABDOMEN: soft, nontender, no hepatosplenomegaly, no masses and bowel sounds normal  MS: no gross musculoskeletal defects noted, no edema  SKIN: no suspicious lesions or rashes  NEURO: Normal strength and tone, mentation intact and speech normal  PSYCH: mentation appears normal, affect normal/bright    EKG - Normal Sinus Rhythm, Low voltage in precordial leads., normal axis, normal intervals, no acute ST/T changes c/w ischemia, no LVH by voltage criteria, unchanged from previous tracings  I personally read, reviewed, and advised patient of EKG results.     CXR: NEGATIVE       Signed Electronically by: Judy David PA-C    "

## 2024-05-31 NOTE — PATIENT INSTRUCTIONS
"Preventive Care Advice   This is general advice we often give to help people stay healthy. Your care team may have specific advice just for you. Please talk to your care team about your own preventive care needs.  Lifestyle  Exercise at least 150 minutes each week (30 minutes a day, 5 days a week).  Do muscle strengthening activities 2 days a week. These help control your weight and prevent disease.  No smoking.  Wear sunscreen to prevent skin cancer.  Have your home tested for radon every 2 to 5 years. Radon is a colorless, odorless gas that can harm your lungs. To learn more, go to www.health.[facility].[place]. and search for \"Radon in Homes.\"  Keep guns unloaded and locked up in a safe place like a safe or gun vault, or, use a gun lock and hide the keys. Always lock away bullets separately. To learn more, visit [employer].gov and search for \"safe gun storage.\"  Nutrition  Eat 5 or more servings of fruits and vegetables each day.  Try wheat bread, brown rice and whole grain pasta (instead of white bread, rice, and pasta).  Get enough calcium and vitamin D. Check the label on foods and aim for 100% of the RDA (recommended daily allowance).  Regular exams  Have a dental exam and cleaning every 6 months.  See your health care team every year to talk about:  Any changes in your health.  Any medicines your care team has prescribed.  Preventive care, family planning, and ways to prevent chronic diseases.  Shots (vaccines)   HPV shots (up to age 26), if you've never had them before.  Hepatitis B shots (up to age 59), if you've never had them before.  COVID-19 shot: Get this shot when it's due.  Flu shot: Get a flu shot every year.  Tetanus shot: Get a tetanus shot every 10 years.  Pneumococcal, hepatitis A, and RSV shots: Ask your care team if you need these based on your risk.  Shingles shot (for age 50 and up).  General health tests  Diabetes screening:  Starting at age 35, Get screened for diabetes at least every 3 years.  If " you are younger than age 35, ask your care team if you should be screened for diabetes.  Cholesterol test: At age 39, start having a cholesterol test every 5 years, or more often if advised.  Bone density scan (DEXA): At age 50, ask your care team if you should have this scan for osteoporosis (brittle bones).  Hepatitis C: Get tested at least once in your life.  Abdominal aortic aneurysm screening: Talk to your doctor about having this screening if you:  Have ever smoked; and  Are biologically male; and  Are between the ages of 65 and 75.  STIs (sexually transmitted infections)  Before age 24: Ask your care team if you should be screened for STIs.  After age 24: Get screened for STIs if you're at risk. You are at risk for STIs (including HIV) if:  You are sexually active with more than one person.  You don't use condoms every time.  You or a partner was diagnosed with a sexually transmitted infection.  If you are at risk for HIV, ask about PrEP medicine to prevent HIV.  Get tested for HIV at least once in your life, whether you are at risk for HIV or not.  Cancer screening tests  Cervical cancer screening: If you have a cervix, begin getting regular cervical cancer screening tests at age 21. Most people who have regular screenings with normal results can stop after age 65. Talk about this with your provider.  Breast cancer scan (mammogram): If you've ever had breasts, begin having regular mammograms starting at age 40. This is a scan to check for breast cancer.  Colon cancer screening: It is important to start screening for colon cancer at age 45.  Have a colonoscopy test every 10 years (or more often if you're at risk) Or, ask your provider about stool tests like a FIT test every year or Cologuard test every 3 years.  To learn more about your testing options, visit: www.[employer]/848176.pdf.  For help making a decision, visit: toby/wj84542.  Prostate cancer screening test: If you have a prostate and are age 55  to 69, ask your provider if you would benefit from a yearly prostate cancer screening test.  Lung cancer screening: If you are a current or former smoker age 50 to 80, ask your care team if ongoing lung cancer screenings are right for you.  For informational purposes only. Not to replace the advice of your health care provider. Copyright   2023 [employer]. All rights reserved. Clinically reviewed by the [facility]. [employer] [id number] - REV 04/24.

## 2024-06-04 ENCOUNTER — PATIENT OUTREACH (OUTPATIENT)
Dept: CARE COORDINATION | Facility: CLINIC | Age: 54
End: 2024-06-04
Payer: COMMERCIAL

## 2024-06-05 ENCOUNTER — ANCILLARY PROCEDURE (OUTPATIENT)
Dept: MAMMOGRAPHY | Facility: CLINIC | Age: 54
End: 2024-06-05
Attending: PHYSICIAN ASSISTANT
Payer: COMMERCIAL

## 2024-06-05 DIAGNOSIS — Z12.31 VISIT FOR SCREENING MAMMOGRAM: ICD-10-CM

## 2024-06-05 PROCEDURE — 77067 SCR MAMMO BI INCL CAD: CPT | Performed by: RADIOLOGY

## 2024-06-05 PROCEDURE — 77063 BREAST TOMOSYNTHESIS BI: CPT | Performed by: RADIOLOGY

## 2024-06-21 ENCOUNTER — HOSPITAL ENCOUNTER (OUTPATIENT)
Dept: CARDIOLOGY | Facility: HOSPITAL | Age: 54
Discharge: HOME | End: 2024-06-21
Attending: PHYSICIAN ASSISTANT | Admitting: PHYSICIAN ASSISTANT
Payer: COMMERCIAL

## 2024-06-21 DIAGNOSIS — R07.89 OTHER CHEST PAIN: ICD-10-CM

## 2024-06-21 LAB
CV STRESS CURRENT BP HE: NORMAL
CV STRESS CURRENT HR HE: 106
CV STRESS CURRENT HR HE: 107
CV STRESS CURRENT HR HE: 112
CV STRESS CURRENT HR HE: 115
CV STRESS CURRENT HR HE: 120
CV STRESS CURRENT HR HE: 121
CV STRESS CURRENT HR HE: 122
CV STRESS CURRENT HR HE: 125
CV STRESS CURRENT HR HE: 135
CV STRESS CURRENT HR HE: 139
CV STRESS CURRENT HR HE: 142
CV STRESS CURRENT HR HE: 143
CV STRESS CURRENT HR HE: 158
CV STRESS CURRENT HR HE: 87
CV STRESS CURRENT HR HE: 89
CV STRESS CURRENT HR HE: 91
CV STRESS CURRENT HR HE: 92
CV STRESS CURRENT HR HE: 95
CV STRESS CURRENT HR HE: 96
CV STRESS CURRENT HR HE: 97
CV STRESS CURRENT HR HE: 97
CV STRESS CURRENT HR HE: 99
CV STRESS DEVIATION TIME HE: NORMAL
CV STRESS ECHO PERCENT HR HE: NORMAL
CV STRESS EXERCISE STAGE HE: NORMAL
CV STRESS EXERCISE STAGE REACHED HE: NORMAL
CV STRESS FINAL RESTING BP HE: NORMAL
CV STRESS FINAL RESTING HR HE: 91
CV STRESS MAX HR HE: 160
CV STRESS MAX TREADMILL GRADE HE: 14
CV STRESS MAX TREADMILL SPEED HE: 3.4
CV STRESS PEAK DIA BP HE: NORMAL
CV STRESS PEAK SYS BP HE: NORMAL
CV STRESS PHASE HE: NORMAL
CV STRESS PROTOCOL HE: NORMAL
CV STRESS REASON STOPPED HE: NORMAL
CV STRESS RESTING PT POSITION HE: NORMAL
CV STRESS RESTING PT POSITION HE: NORMAL
CV STRESS ST DEVIATION AMOUNT HE: NORMAL
CV STRESS ST DEVIATION ELEVATION HE: NORMAL
CV STRESS ST EVELATION AMOUNT HE: NORMAL
CV STRESS SYMPTOMS HE: NORMAL
CV STRESS TEST TYPE HE: NORMAL
CV STRESS TOTAL STAGE TIME MIN 1 HE: NORMAL
STRESS ECHO BASELINE DIASTOLIC HE: 103
STRESS ECHO BASELINE HR: 86
STRESS ECHO BASELINE SYSTOLIC BP: 178
STRESS ECHO LAST STRESS DIASTOLIC BP: 94
STRESS ECHO LAST STRESS HR: 158
STRESS ECHO LAST STRESS SYSTOLIC BP: 170
STRESS ECHO POST ESTIMATED WORKLOAD: 8.9
STRESS ECHO POST EXERCISE DUR MIN: 7
STRESS ECHO POST EXERCISE DUR SEC: 0
STRESS ECHO TARGET HR: 143

## 2024-06-21 PROCEDURE — 93350 STRESS TTE ONLY: CPT | Mod: 26 | Performed by: INTERNAL MEDICINE

## 2024-06-21 PROCEDURE — 93321 DOPPLER ECHO F-UP/LMTD STD: CPT | Mod: 26 | Performed by: INTERNAL MEDICINE

## 2024-06-21 PROCEDURE — 93016 CV STRESS TEST SUPVJ ONLY: CPT | Performed by: INTERNAL MEDICINE

## 2024-06-21 PROCEDURE — 93325 DOPPLER ECHO COLOR FLOW MAPG: CPT | Mod: 26 | Performed by: INTERNAL MEDICINE

## 2024-06-21 PROCEDURE — 255N000002 HC RX 255 OP 636: Performed by: PHYSICIAN ASSISTANT

## 2024-06-21 PROCEDURE — 93018 CV STRESS TEST I&R ONLY: CPT | Performed by: INTERNAL MEDICINE

## 2024-06-21 PROCEDURE — 93352 ADMIN ECG CONTRAST AGENT: CPT | Performed by: INTERNAL MEDICINE

## 2024-06-21 RX ADMIN — PERFLUTREN 3 ML: 6.52 INJECTION, SUSPENSION INTRAVENOUS at 09:20

## 2024-06-27 ENCOUNTER — OFFICE VISIT (OUTPATIENT)
Dept: CARDIOLOGY | Facility: CLINIC | Age: 54
End: 2024-06-27
Attending: PHYSICIAN ASSISTANT
Payer: COMMERCIAL

## 2024-06-27 VITALS
OXYGEN SATURATION: 97 % | TEMPERATURE: 98.1 F | SYSTOLIC BLOOD PRESSURE: 139 MMHG | RESPIRATION RATE: 16 BRPM | BODY MASS INDEX: 41.3 KG/M2 | WEIGHT: 233.1 LBS | DIASTOLIC BLOOD PRESSURE: 82 MMHG | HEIGHT: 63 IN | HEART RATE: 88 BPM

## 2024-06-27 DIAGNOSIS — R07.89 OTHER CHEST PAIN: ICD-10-CM

## 2024-06-27 PROCEDURE — 99204 OFFICE O/P NEW MOD 45 MIN: CPT | Performed by: INTERNAL MEDICINE

## 2024-06-27 NOTE — LETTER
6/27/2024    Judy David PA-C  [id number] [facility] [address]    RE: Justyna CARRERA Leatha       Dear Colleague,     I had the pleasure of seeing Justyna Zhang in the [facility].         [facility]   [address], [place] [address]   [email or web address]   OFFICE: [phone]          Thank you Dr. David for asking the [facility] team to participate in the care of your patient, Justyna Zhang.     Impression and Plan     1.  Chest discomfort.  Certain features are somewhat atypical for cardiac etiology, ischemic or otherwise.  She states he has had only two episodes, one which did occur with activity though the other without.  She had a stress echocardiogram performed that revealed no echocardiographic or ECG evidence of ischemia.  Again she has had no recurrence of chest discomfort symptoms otherwise.    Given the atypical nature of her symptoms, lack of any recurrence, and favorable stress echocardiogram results; do not feel additional cardiac workup is necessarily required.  Discussed with Aamir and she is very reassured.  We jointly decided to simply reconvene on an as-needed basis should she have recurrent symptoms more suggestive of true angina.  He was comfortable with this plan.    35 minutes spent reviewing prior records (including documentation, laboratory studies, cardiac testing/imaging), interview with patient along with physical exam, planning, and subsequent documentation/crafting of note).           History of Present Illness    Once again I would like to thank you again for asking me to participate in the care of your patient, Justyna Zhang.  As you know, but to reiterate for my own records, Justyna Zhang is a 54 year old female with chest discomfort.    On interview, Justyna states that she had an episode of chest discomfort while she was walking in the store.  This spontaneously  "resolved.  She has had only one recurrent episode though this occurred at rest.  She has had no other recurrent episodes with exertion or otherwise.  She reports no subjective decrease in exercise tolerance from baseline though admits that she is quite sedentary.  Nonetheless she reports no change from baseline.  He reports no palpitations or lightheadedness.    Further review of systems is otherwise negative/noncontributory (medical record and 13 point review of systems reviewed as well and pertinent positives noted).         Cardiac Diagnostics/Imaging      Stress echocardiogram 21 June 2024:  Normal stress echocardiogram with no evidence of stress-induced ischemia.  Resting images show trivial mitral regurgitation with preserved ejection fraction 55%.  Post exercise there is an appropriate decrease in chamber size with appropriate increase in wall motion with ejection fraction of 65% and no new wall motion abnormalities. Echo images are somewhat suboptimal.  Adequate negative exercise ECG for ischemia after 7 minutes on the standard protocol with patient achieving heart rate of 158 for 95% of age-predicted maximal heart rate, blood pressure 170/94 and no cardiovascular symptoms.  Average exercise tolerance for age with patient achieving 8.9 METS on the standard Evans protocol.    Chest radiograph 31 May 2023:  Negative chest.           Physical Examination       /82 (BP Location: Left arm, Patient Position: Sitting, Cuff Size: Adult Regular)   Pulse 88   Temp 98.1  F (36.7  C) (Oral)   Resp 16   Ht 1.6 m (5' 3\")   Wt 105.7 kg (233 lb 1.6 oz)   LMP 03/18/2021 (Approximate)   SpO2 97%   BMI 41.29 kg/m          Wt Readings from Last 3 Encounters:   06/27/24 105.7 kg (233 lb 1.6 oz)   05/31/24 105 kg (231 lb 8 oz)   05/16/24 104.3 kg (230 lb)       The patient is alert and oriented times three. Sclerae are anicteric. Mucosal membranes are moist. Jugular venous pressure is normal. No significant " adenopathy/thyromegally appreciated. Lungs are clear with good expansion. On cardiovascular exam, the patient has a regular S1 and S2. Abdomen is soft and non-tender. Extremities reveal no clubbing, cyanosis, or edema.         Family History/Social History/Risk Factors   Patient does not smoke.  Family history reviewed, and family history includes Asthma in her mother; Diabetes in her mother; Hyperlipidemia in her mother; Hypertension in her mother; Obesity in her mother; Unknown/Adopted in an other family member. She was adopted.          Medical History  Surgical History Family History Social History   Past Medical History:   Diagnosis Date    Fibromyalgia     Uncomplicated asthma 2006    Weather and sports related     Past Surgical History:   Procedure Laterality Date    COLONOSCOPY N/A 09/19/2022    Procedure: COLONOSCOPY;  Surgeon: Angel Peace MD;  Location: [facility]    ENT SURGERY  1976    Several sets of ear tubes as a child    GENITOURINARY SURGERY  2004    Bladder sling put in twice     Family History   Adopted: Yes   Problem Relation Age of Onset    Diabetes Mother     Hypertension Mother     Hyperlipidemia Mother     Asthma Mother     Obesity Mother     Unknown/Adopted Other         Social History     Socioeconomic History    Marital status:      Spouse name: Not on file    Number of children: Not on file    Years of education: Not on file    Highest education level: Not on file   Occupational History    Not on file   Tobacco Use    Smoking status: Never    Smokeless tobacco: Never   Vaping Use    Vaping status: Never Used   Substance and Sexual Activity    Alcohol use: Yes     Comment: A couple drinks per week    Drug use: Never    Sexual activity: Yes     Partners: Male     Birth control/protection: Male Surgical   Other Topics Concern    Parent/sibling w/ CABG, MI or angioplasty before 65F 55M? No   Social History Narrative    Not on file     Social Determinants of Health     Financial Resource  Strain: Low Risk  (5/28/2024)    Financial Resource Strain     Within the past 12 months, have you or your family members you live with been unable to get utilities (heat, electricity) when it was really needed?: No   Food Insecurity: Low Risk  (5/28/2024)    Food Insecurity     Within the past 12 months, did you worry that your food would run out before you got money to buy more?: No     Within the past 12 months, did the food you bought just not last and you didn t have money to get more?: No   Transportation Needs: Low Risk  (5/28/2024)    Transportation Needs     Within the past 12 months, has lack of transportation kept you from medical appointments, getting your medicines, non-medical meetings or appointments, work, or from getting things that you need?: No   Physical Activity: Insufficiently Active (5/28/2024)    Exercise Vital Sign     Days of Exercise per Week: 1 day     Minutes of Exercise per Session: 10 min   Stress: No Stress Concern Present (5/28/2024)    [place] [employer] - Occupational Stress Questionnaire     Feeling of Stress : Not at all   Social Connections: Unknown (5/28/2024)    Social Connection and Isolation Panel [NHANES]     Frequency of Communication with Friends and Family: Not on file     Frequency of Social Gatherings with Friends and Family: Once a week     Attends Synagogue Services: Not on file     Active Member of Clubs or Organizations: Not on file     Attends Club or Organization Meetings: Not on file     Marital Status: Not on file   Interpersonal Safety: Low Risk  (5/31/2024)    Interpersonal Safety     Do you feel physically and emotionally safe where you currently live?: Yes     Within the past 12 months, have you been hit, slapped, kicked or otherwise physically hurt by someone?: No     Within the past 12 months, have you been humiliated or emotionally abused in other ways by your partner or ex-partner?: No   Housing Stability: Low Risk  (5/28/2024)     Housing Stability     Do you have housing? : Yes     Are you worried about losing your housing?: No           Medications  Allergies   Current Outpatient Medications   Medication Sig Dispense Refill    albuterol (PROAIR HFA/PROVENTIL HFA/VENTOLIN HFA) 108 (90 Base) MCG/ACT inhaler Inhale 2 puffs into the lungs every 6 hours as needed for shortness of breath or wheezing 18 g 0    EPINEPHrine (ANY BX GENERIC EQUIV) 0.3 MG/0.3ML injection 2-pack Inject 0.3 mLs (0.3 mg) into the muscle as needed for anaphylaxis 1 each 1    furosemide (LASIX) 20 MG tablet Take 1 tablet (20 mg) by mouth daily as needed (edema) 90 tablet 1    levocetirizine (XYZAL) 5 MG tablet Take 1 tablet (5 mg) by mouth every evening 90 tablet 1    norethindrone-eth estradiol (FEMHRT LOW DOSE) 0.5-2.5 MG-MCG tablet Take 1 tablet by mouth daily 90 tablet 1    omeprazole (PRILOSEC) 10 MG DR capsule Take 1 capsule (10 mg) by mouth daily 90 capsule 3    tiZANidine (ZANAFLEX) 4 MG tablet Take 1 tablet (4 mg) by mouth nightly as needed for muscle spasms 90 tablet 0    zolpidem (AMBIEN) 5 MG tablet Take tablet by mouth 15 minutes prior to sleep, for Sleep Study 1 tablet 0       Allergies   Allergen Reactions    Ciprofloxacin Hives    Shellfish-Derived Products Anaphylaxis          Lab Results    Chemistry/lipid CBC Cardiac Enzymes/BNP/TSH/INR   Recent Labs   Lab Test 05/31/24  0942   CHOL 148   HDL 42*   LDL 76   TRIG 149     Recent Labs   Lab Test 05/31/24  0942 07/07/23  1155 06/06/22  0848   LDL 76 88 82     Recent Labs   Lab Test 05/31/24  0942      POTASSIUM 3.9   CHLORIDE 105   CO2 17*   GLC 94   BUN 13.8   CR 0.75   GFRESTIMATED >90   CHARLES 8.9     Recent Labs   Lab Test 05/31/24  0942 07/07/23  1155 06/06/22  0848   CR 0.75 0.73 0.74     Recent Labs   Lab Test 07/07/23  1155   A1C 5.5          Recent Labs   Lab Test 05/31/24  0942   WBC 5.3   HGB 14.7   HCT 42.5   MCV 86        Recent Labs   Lab Test 05/31/24  0942 06/06/22  0848   HGB 14.7  "14.6    No results for input(s): \"TROPONINI\" in the last 65844 hours.  No results for input(s): \"BNP\", \"NTBNPI\", \"NTBNP\" in the last 13364 hours.  Recent Labs   Lab Test 05/31/24  0942   TSH 2.65     No results for input(s): \"INR\" in the last 96036 hours.       Medications  Allergies   Current Outpatient Medications   Medication Sig Dispense Refill    albuterol (PROAIR HFA/PROVENTIL HFA/VENTOLIN HFA) 108 (90 Base) MCG/ACT inhaler Inhale 2 puffs into the lungs every 6 hours as needed for shortness of breath or wheezing 18 g 0    EPINEPHrine (ANY BX GENERIC EQUIV) 0.3 MG/0.3ML injection 2-pack Inject 0.3 mLs (0.3 mg) into the muscle as needed for anaphylaxis 1 each 1    furosemide (LASIX) 20 MG tablet Take 1 tablet (20 mg) by mouth daily as needed (edema) 90 tablet 1    levocetirizine (XYZAL) 5 MG tablet Take 1 tablet (5 mg) by mouth every evening 90 tablet 1    norethindrone-eth estradiol (FEMHRT LOW DOSE) 0.5-2.5 MG-MCG tablet Take 1 tablet by mouth daily 90 tablet 1    omeprazole (PRILOSEC) 10 MG DR capsule Take 1 capsule (10 mg) by mouth daily 90 capsule 3    tiZANidine (ZANAFLEX) 4 MG tablet Take 1 tablet (4 mg) by mouth nightly as needed for muscle spasms 90 tablet 0    zolpidem (AMBIEN) 5 MG tablet Take tablet by mouth 15 minutes prior to sleep, for Sleep Study 1 tablet 0      Allergies   Allergen Reactions    Ciprofloxacin Hives    Shellfish-Derived Products Anaphylaxis          Lab Results   Lab Results   Component Value Date     05/31/2024     12/04/2020    CO2 17 05/31/2024    CO2 26 06/06/2022    CO2 25 12/04/2020    BUN 13.8 05/31/2024    BUN 17 06/06/2022    BUN 13 12/04/2020     Lab Results   Component Value Date    WBC 5.3 05/31/2024    HGB 14.7 05/31/2024    HCT 42.5 05/31/2024    MCV 86 05/31/2024     05/31/2024     Lab Results   Component Value Date    CHOL 148 05/31/2024    CHOL 183 12/04/2020    TRIG 149 05/31/2024    TRIG 203 12/04/2020    HDL 42 05/31/2024    HDL 61 12/04/2020 " "    No results found for: \"INR\"  No results found for: \"BNP\"  No results found for: \"CKTOTAL\", \"CKMB\", \"TROPONINI\"  Lab Results   Component Value Date    TSH 2.65 05/31/2024    TSH 2.97 06/06/2022    TSH 2.80 01/21/2016                      Thank you for allowing me to participate in the care of your patient.      Sincerely,     Denver Chávez MD     [facility]  cc:   Judy David PA-C  [id number] [place] [address]      "

## 2024-06-27 NOTE — PROGRESS NOTES
[facility], [facility] HEART CARE   [address], [place] [address]   [email or web address]   OFFICE: [phone]          Thank you Dr. David for asking the [facility] Heart Care team to participate in the care of your patient, Justyna Zhang.     Impression and Plan     1.  Chest discomfort.  Certain features are somewhat atypical for cardiac etiology, ischemic or otherwise.  She states he has had only two episodes, one which did occur with activity though the other without.  She had a stress echocardiogram performed that revealed no echocardiographic or ECG evidence of ischemia.  Again she has had no recurrence of chest discomfort symptoms otherwise.    Given the atypical nature of her symptoms, lack of any recurrence, and favorable stress echocardiogram results; do not feel additional cardiac workup is necessarily required.  Discussed with Aamir and she is very reassured.  We jointly decided to simply reconvene on an as-needed basis should she have recurrent symptoms more suggestive of true angina.  He was comfortable with this plan.    35 minutes spent reviewing prior records (including documentation, laboratory studies, cardiac testing/imaging), interview with patient along with physical exam, planning, and subsequent documentation/crafting of note).           History of Present Illness    Once again I would like to thank you again for asking me to participate in the care of your patient, Justyna Zhang.  As you know, but to reiterate for my own records, Justyna Zhang is a 54 year old female with chest discomfort.    On interview, Justyna states that she had an episode of chest discomfort while she was walking in the store.  This spontaneously resolved.  She has had only one recurrent episode though this occurred at rest.  She has had no other recurrent episodes with exertion or otherwise.  She reports no subjective decrease in exercise tolerance from baseline  "though admits that she is quite sedentary.  Nonetheless she reports no change from baseline.  He reports no palpitations or lightheadedness.    Further review of systems is otherwise negative/noncontributory (medical record and 13 point review of systems reviewed as well and pertinent positives noted).         Cardiac Diagnostics/Imaging      Stress echocardiogram 21 June 2024:  Normal stress echocardiogram with no evidence of stress-induced ischemia.  Resting images show trivial mitral regurgitation with preserved ejection fraction 55%.  Post exercise there is an appropriate decrease in chamber size with appropriate increase in wall motion with ejection fraction of 65% and no new wall motion abnormalities. Echo images are somewhat suboptimal.  Adequate negative exercise ECG for ischemia after 7 minutes on the standard protocol with patient achieving heart rate of 158 for 95% of age-predicted maximal heart rate, blood pressure 170/94 and no cardiovascular symptoms.  Average exercise tolerance for age with patient achieving 8.9 METS on the standard Evans protocol.    Chest radiograph 31 May 2023:  Negative chest.           Physical Examination       /82 (BP Location: Left arm, Patient Position: Sitting, Cuff Size: Adult Regular)   Pulse 88   Temp 98.1  F (36.7  C) (Oral)   Resp 16   Ht 1.6 m (5' 3\")   Wt 105.7 kg (233 lb 1.6 oz)   LMP 03/18/2021 (Approximate)   SpO2 97%   BMI 41.29 kg/m          Wt Readings from Last 3 Encounters:   06/27/24 105.7 kg (233 lb 1.6 oz)   05/31/24 105 kg (231 lb 8 oz)   05/16/24 104.3 kg (230 lb)       The patient is alert and oriented times three. Sclerae are anicteric. Mucosal membranes are moist. Jugular venous pressure is normal. No significant adenopathy/thyromegally appreciated. Lungs are clear with good expansion. On cardiovascular exam, the patient has a regular S1 and S2. Abdomen is soft and non-tender. Extremities reveal no clubbing, cyanosis, or edema.   "       Family History/Social History/Risk Factors   Patient does not smoke.  Family history reviewed, and family history includes Asthma in her mother; Diabetes in her mother; Hyperlipidemia in her mother; Hypertension in her mother; Obesity in her mother; Unknown/Adopted in an other family member. She was adopted.          Medical History  Surgical History Family History Social History   Past Medical History:   Diagnosis Date    Fibromyalgia     Uncomplicated asthma 2006    Weather and sports related     Past Surgical History:   Procedure Laterality Date    COLONOSCOPY N/A 09/19/2022    Procedure: COLONOSCOPY;  Surgeon: Angel Peace MD;  Location: [facility]    ENT SURGERY  1976    Several sets of ear tubes as a child    GENITOURINARY SURGERY  2004    Bladder sling put in twice     Family History   Adopted: Yes   Problem Relation Age of Onset    Diabetes Mother     Hypertension Mother     Hyperlipidemia Mother     Asthma Mother     Obesity Mother     Unknown/Adopted Other         Social History     Socioeconomic History    Marital status:      Spouse name: Not on file    Number of children: Not on file    Years of education: Not on file    Highest education level: Not on file   Occupational History    Not on file   Tobacco Use    Smoking status: Never    Smokeless tobacco: Never   Vaping Use    Vaping status: Never Used   Substance and Sexual Activity    Alcohol use: Yes     Comment: A couple drinks per week    Drug use: Never    Sexual activity: Yes     Partners: Male     Birth control/protection: Male Surgical   Other Topics Concern    Parent/sibling w/ CABG, MI or angioplasty before 65F 55M? No   Social History Narrative    Not on file     Social Determinants of Health     Financial Resource Strain: Low Risk  (5/28/2024)    Financial Resource Strain     Within the past 12 months, have you or your family members you live with been unable to get utilities (heat, electricity) when it was really needed?: No    Food Insecurity: Low Risk  (5/28/2024)    Food Insecurity     Within the past 12 months, did you worry that your food would run out before you got money to buy more?: No     Within the past 12 months, did the food you bought just not last and you didn t have money to get more?: No   Transportation Needs: Low Risk  (5/28/2024)    Transportation Needs     Within the past 12 months, has lack of transportation kept you from medical appointments, getting your medicines, non-medical meetings or appointments, work, or from getting things that you need?: No   Physical Activity: Insufficiently Active (5/28/2024)    Exercise Vital Sign     Days of Exercise per Week: 1 day     Minutes of Exercise per Session: 10 min   Stress: No Stress Concern Present (5/28/2024)    [employer] [place] [employer] - Occupational Stress Questionnaire     Feeling of Stress : Not at all   Social Connections: Unknown (5/28/2024)    Social Connection and Isolation Panel [NHANES]     Frequency of Communication with Friends and Family: Not on file     Frequency of Social Gatherings with Friends and Family: Once a week     Attends Spiritism Services: Not on file     Active Member of Clubs or Organizations: Not on file     Attends Club or Organization Meetings: Not on file     Marital Status: Not on file   Interpersonal Safety: Low Risk  (5/31/2024)    Interpersonal Safety     Do you feel physically and emotionally safe where you currently live?: Yes     Within the past 12 months, have you been hit, slapped, kicked or otherwise physically hurt by someone?: No     Within the past 12 months, have you been humiliated or emotionally abused in other ways by your partner or ex-partner?: No   Housing Stability: Low Risk  (5/28/2024)    Housing Stability     Do you have housing? : Yes     Are you worried about losing your housing?: No           Medications  Allergies   Current Outpatient Medications   Medication Sig Dispense Refill    albuterol  "(PROAIR HFA/PROVENTIL HFA/VENTOLIN HFA) 108 (90 Base) MCG/ACT inhaler Inhale 2 puffs into the lungs every 6 hours as needed for shortness of breath or wheezing 18 g 0    EPINEPHrine (ANY BX GENERIC EQUIV) 0.3 MG/0.3ML injection 2-pack Inject 0.3 mLs (0.3 mg) into the muscle as needed for anaphylaxis 1 each 1    furosemide (LASIX) 20 MG tablet Take 1 tablet (20 mg) by mouth daily as needed (edema) 90 tablet 1    levocetirizine (XYZAL) 5 MG tablet Take 1 tablet (5 mg) by mouth every evening 90 tablet 1    norethindrone-eth estradiol (FEMHRT LOW DOSE) 0.5-2.5 MG-MCG tablet Take 1 tablet by mouth daily 90 tablet 1    omeprazole (PRILOSEC) 10 MG DR capsule Take 1 capsule (10 mg) by mouth daily 90 capsule 3    tiZANidine (ZANAFLEX) 4 MG tablet Take 1 tablet (4 mg) by mouth nightly as needed for muscle spasms 90 tablet 0    zolpidem (AMBIEN) 5 MG tablet Take tablet by mouth 15 minutes prior to sleep, for Sleep Study 1 tablet 0       Allergies   Allergen Reactions    Ciprofloxacin Hives    Shellfish-Derived Products Anaphylaxis          Lab Results    Chemistry/lipid CBC Cardiac Enzymes/BNP/TSH/INR   Recent Labs   Lab Test 05/31/24  0942   CHOL 148   HDL 42*   LDL 76   TRIG 149     Recent Labs   Lab Test 05/31/24  0942 07/07/23  1155 06/06/22  0848   LDL 76 88 82     Recent Labs   Lab Test 05/31/24  0942      POTASSIUM 3.9   CHLORIDE 105   CO2 17*   GLC 94   BUN 13.8   CR 0.75   GFRESTIMATED >90   CHARLES 8.9     Recent Labs   Lab Test 05/31/24  0942 07/07/23  1155 06/06/22  0848   CR 0.75 0.73 0.74     Recent Labs   Lab Test 07/07/23  1155   A1C 5.5          Recent Labs   Lab Test 05/31/24  0942   WBC 5.3   HGB 14.7   HCT 42.5   MCV 86        Recent Labs   Lab Test 05/31/24  0942 06/06/22  0848   HGB 14.7 14.6    No results for input(s): \"TROPONINI\" in the last 86651 hours.  No results for input(s): \"BNP\", \"NTBNPI\", \"NTBNP\" in the last 40904 hours.  Recent Labs   Lab Test 05/31/24  0942   TSH 2.65     No results " "for input(s): \"INR\" in the last 64476 hours.       Medications  Allergies   Current Outpatient Medications   Medication Sig Dispense Refill    albuterol (PROAIR HFA/PROVENTIL HFA/VENTOLIN HFA) 108 (90 Base) MCG/ACT inhaler Inhale 2 puffs into the lungs every 6 hours as needed for shortness of breath or wheezing 18 g 0    EPINEPHrine (ANY BX GENERIC EQUIV) 0.3 MG/0.3ML injection 2-pack Inject 0.3 mLs (0.3 mg) into the muscle as needed for anaphylaxis 1 each 1    furosemide (LASIX) 20 MG tablet Take 1 tablet (20 mg) by mouth daily as needed (edema) 90 tablet 1    levocetirizine (XYZAL) 5 MG tablet Take 1 tablet (5 mg) by mouth every evening 90 tablet 1    norethindrone-eth estradiol (FEMHRT LOW DOSE) 0.5-2.5 MG-MCG tablet Take 1 tablet by mouth daily 90 tablet 1    omeprazole (PRILOSEC) 10 MG DR capsule Take 1 capsule (10 mg) by mouth daily 90 capsule 3    tiZANidine (ZANAFLEX) 4 MG tablet Take 1 tablet (4 mg) by mouth nightly as needed for muscle spasms 90 tablet 0    zolpidem (AMBIEN) 5 MG tablet Take tablet by mouth 15 minutes prior to sleep, for Sleep Study 1 tablet 0      Allergies   Allergen Reactions    Ciprofloxacin Hives    Shellfish-Derived Products Anaphylaxis          Lab Results   Lab Results   Component Value Date     05/31/2024     12/04/2020    CO2 17 05/31/2024    CO2 26 06/06/2022    CO2 25 12/04/2020    BUN 13.8 05/31/2024    BUN 17 06/06/2022    BUN 13 12/04/2020     Lab Results   Component Value Date    WBC 5.3 05/31/2024    HGB 14.7 05/31/2024    HCT 42.5 05/31/2024    MCV 86 05/31/2024     05/31/2024     Lab Results   Component Value Date    CHOL 148 05/31/2024    CHOL 183 12/04/2020    TRIG 149 05/31/2024    TRIG 203 12/04/2020    HDL 42 05/31/2024    HDL 61 12/04/2020     No results found for: \"INR\"  No results found for: \"BNP\"  No results found for: \"CKTOTAL\", \"CKMB\", \"TROPONINI\"  Lab Results   Component Value Date    TSH 2.65 05/31/2024    TSH 2.97 06/06/2022    TSH 2.80 " 01/21/2016

## 2024-10-01 ENCOUNTER — MYC REFILL (OUTPATIENT)
Dept: FAMILY MEDICINE | Facility: CLINIC | Age: 54
End: 2024-10-01
Payer: COMMERCIAL

## 2024-10-01 DIAGNOSIS — Z91.09 MULTIPLE ENVIRONMENTAL ALLERGIES: ICD-10-CM

## 2024-10-01 DIAGNOSIS — N95.1 MENOPAUSAL SYNDROME (HOT FLASHES): ICD-10-CM

## 2024-10-03 RX ORDER — NORETHINDRONE ACETATE AND ETHINYL ESTRADIOL .5; 2.5 MG/1; UG/1
1 TABLET ORAL DAILY
Qty: 90 TABLET | Refills: 1 | OUTPATIENT
Start: 2024-10-03

## 2024-10-03 NOTE — TELEPHONE ENCOUNTER
Pending Prescriptions:                       Disp   Refills    EPINEPHrine (ANY BX GENERIC EQUIV) 0.3 MG/*1 each 1        Sig: Inject 0.3 mLs (0.3 mg) into the muscle as needed for           anaphylaxis.  Refused Prescriptions:                       Disp   Refills    norethindrone-eth estradiol (FEMHRT LOW DO*90 tab*1        Sig: Take 1 tablet by mouth daily.  Refused By: RUFINO MERCHANT  Reason for Refusal: Should already have refills on file    Routing refill request to provider for review/approval because:  Requested Prescriptions   Pending Prescriptions Disp Refills    EPINEPHrine (ANY BX GENERIC EQUIV) 0.3 MG/0.3ML injection 2-pack 1 each 1     Sig: Inject 0.3 mLs (0.3 mg) into the muscle as needed for anaphylaxis.       Anaphylaxis Kits Protocol Failed - 10/1/2024  4:36 PM        Failed - Medication incated for associated diagnosis     The medicaiton is prescribed for one or more of the following conditions:    Anaphylaxis   Allergy (grouper)   Angioedema   Mast cell disease    Allergic reaction caused by insect bite and/or insect sting             Passed - Patient is age 5 or older        Passed - Medication is active on med list        Passed - Recent (12 mo) or future (90 days) visit witin the authorizing provider's specialty     The patient must have completed an in-person or virtual visit within the past 12 months or has a future visit scheduled within the next 90 days with the authorizing provider s specialty.  Urgent care and e-visits do not quality as an office visit for this protocol.           Refused Prescriptions Disp Refills    norethindrone-eth estradiol (FEMHRT LOW DOSE) 0.5-2.5 MG-MCG tablet 90 tablet 1     Sig: Take 1 tablet by mouth daily.       Hormone Replacement Therapy Failed - 10/1/2024  4:36 PM        Failed - Medication indicated for associated diagnosis     The medication is prescribed for one or more of the following conditions:    Menopause   Vulva/Vaginal atrophy   Low  Estrogen   Gender Dysphoria   Male to Female transgender          Passed - Blood pressure under 140/90 in past 12 months     BP Readings from Last 3 Encounters:   06/27/24 139/82   05/31/24 106/78   07/07/23 122/88       No data recorded            Passed - Medication is active on med list        Passed - Recent (12 mo) or future (90 days) visit within the authorizing provider's specialty     The patient must have completed an in-person or virtual visit within the past 12 months or has a future visit scheduled within the next 90 days with the authorizing provider s specialty.  Urgent care and e-visits do not quality as an office visit for this protocol.          Passed - Patient is 18 years of age or older        Passed - No active pregnancy on record        Passed - No positive pregnancy test on record in past 12 months           Fiorella Floyd RN  [facility]

## 2024-10-04 RX ORDER — EPINEPHRINE 0.3 MG/.3ML
0.3 INJECTION SUBCUTANEOUS PRN
Qty: 1 EACH | Refills: 1 | Status: SHIPPED | OUTPATIENT
Start: 2024-10-04

## 2024-11-04 ENCOUNTER — MYC REFILL (OUTPATIENT)
Dept: FAMILY MEDICINE | Facility: CLINIC | Age: 54
End: 2024-11-04
Payer: COMMERCIAL

## 2024-11-04 DIAGNOSIS — Z91.09 MULTIPLE ENVIRONMENTAL ALLERGIES: ICD-10-CM

## 2024-11-04 RX ORDER — LEVOCETIRIZINE DIHYDROCHLORIDE 5 MG/1
5 TABLET, FILM COATED ORAL EVERY EVENING
Qty: 90 TABLET | Refills: 1 | Status: SHIPPED | OUTPATIENT
Start: 2024-11-04

## 2024-11-04 NOTE — TELEPHONE ENCOUNTER
Requested Prescriptions   Pending Prescriptions Disp Refills    levocetirizine (XYZAL) 5 MG tablet 90 tablet 1     Sig: Take 1 tablet (5 mg) by mouth every evening.       Antihistamines Protocol Passed - 11/4/2024  2:12 PM        Passed - Patient is 3-64 years of age     Apply weight-based dosing for peds patients age 3 - 12 years of age.    Forward request to provider for patients under the age of 3 or over the age of 64.          Passed - Recent (12 mo) or future (30 days) visit within the authorizing provider's specialty     The patient must have completed an in-person or virtual visit within the past 12 months or has a future visit scheduled within the next 90 days with the authorizing provider s specialty.  Urgent care and e-visits do not quality as an office visit for this protocol.          Passed - Medication is active on med list        Passed - Medication indicated for associated diagnosis     The medication is associated with one or more of the following diagnoses:  Allergies  Rhinitis  Upper respiratory tract allergy  Urticaria  Itching                   Andres Chow RN 11/04/24 2:12 PM

## 2025-01-28 ENCOUNTER — MYC REFILL (OUTPATIENT)
Dept: FAMILY MEDICINE | Facility: CLINIC | Age: 55
End: 2025-01-28
Payer: COMMERCIAL

## 2025-01-28 DIAGNOSIS — Z91.09 MULTIPLE ENVIRONMENTAL ALLERGIES: ICD-10-CM

## 2025-01-28 RX ORDER — LEVOCETIRIZINE DIHYDROCHLORIDE 5 MG/1
5 TABLET, FILM COATED ORAL EVERY EVENING
Qty: 90 TABLET | Refills: 1 | Status: CANCELLED | OUTPATIENT
Start: 2025-01-28

## 2025-03-23 ENCOUNTER — MYC REFILL (OUTPATIENT)
Dept: FAMILY MEDICINE | Facility: CLINIC | Age: 55
End: 2025-03-23
Payer: COMMERCIAL

## 2025-03-23 DIAGNOSIS — N95.1 MENOPAUSAL SYNDROME (HOT FLASHES): ICD-10-CM

## 2025-03-24 RX ORDER — NORETHINDRONE ACETATE AND ETHINYL ESTRADIOL .5; 2.5 MG/1; UG/1
1 TABLET ORAL DAILY
Qty: 90 TABLET | Refills: 0 | Status: SHIPPED | OUTPATIENT
Start: 2025-03-24

## 2025-05-06 ENCOUNTER — PATIENT OUTREACH (OUTPATIENT)
Dept: CARE COORDINATION | Facility: CLINIC | Age: 55
End: 2025-05-06
Payer: COMMERCIAL

## 2025-05-07 ENCOUNTER — MYC REFILL (OUTPATIENT)
Dept: FAMILY MEDICINE | Facility: CLINIC | Age: 55
End: 2025-05-07
Payer: COMMERCIAL

## 2025-05-07 DIAGNOSIS — Z91.09 MULTIPLE ENVIRONMENTAL ALLERGIES: ICD-10-CM

## 2025-05-07 RX ORDER — LEVOCETIRIZINE DIHYDROCHLORIDE 5 MG/1
5 TABLET, FILM COATED ORAL EVERY EVENING
Qty: 90 TABLET | Refills: 0 | Status: SHIPPED | OUTPATIENT
Start: 2025-05-07

## 2025-05-07 NOTE — TELEPHONE ENCOUNTER
Has upcoming appt 6/2/25 with Judy David.   Prescription approved per [employer] Refill Protocol.  Julie Behrendt RN

## 2025-06-01 SDOH — HEALTH STABILITY: PHYSICAL HEALTH: ON AVERAGE, HOW MANY MINUTES DO YOU ENGAGE IN EXERCISE AT THIS LEVEL?: 30 MIN

## 2025-06-01 SDOH — HEALTH STABILITY: PHYSICAL HEALTH: ON AVERAGE, HOW MANY DAYS PER WEEK DO YOU ENGAGE IN MODERATE TO STRENUOUS EXERCISE (LIKE A BRISK WALK)?: 3 DAYS

## 2025-06-01 ASSESSMENT — SOCIAL DETERMINANTS OF HEALTH (SDOH): HOW OFTEN DO YOU GET TOGETHER WITH FRIENDS OR RELATIVES?: TWICE A WEEK

## 2025-06-02 ENCOUNTER — OFFICE VISIT (OUTPATIENT)
Dept: FAMILY MEDICINE | Facility: CLINIC | Age: 55
End: 2025-06-02
Attending: PHYSICIAN ASSISTANT
Payer: COMMERCIAL

## 2025-06-02 VITALS
HEART RATE: 85 BPM | WEIGHT: 216.2 LBS | SYSTOLIC BLOOD PRESSURE: 108 MMHG | DIASTOLIC BLOOD PRESSURE: 73 MMHG | BODY MASS INDEX: 38.31 KG/M2 | TEMPERATURE: 97.6 F | OXYGEN SATURATION: 100 % | RESPIRATION RATE: 16 BRPM | HEIGHT: 63 IN

## 2025-06-02 DIAGNOSIS — Z91.09 MULTIPLE ENVIRONMENTAL ALLERGIES: ICD-10-CM

## 2025-06-02 DIAGNOSIS — M79.7 FIBROMYALGIA: ICD-10-CM

## 2025-06-02 DIAGNOSIS — K21.9 GASTROESOPHAGEAL REFLUX DISEASE WITHOUT ESOPHAGITIS: ICD-10-CM

## 2025-06-02 DIAGNOSIS — Z00.00 ROUTINE GENERAL MEDICAL EXAMINATION AT A HEALTH CARE FACILITY: Primary | ICD-10-CM

## 2025-06-02 DIAGNOSIS — Z13.220 LIPID SCREENING: ICD-10-CM

## 2025-06-02 DIAGNOSIS — E66.9 OBESITY (BMI 30-39.9): ICD-10-CM

## 2025-06-02 DIAGNOSIS — Z13.1 SCREENING FOR DIABETES MELLITUS: ICD-10-CM

## 2025-06-02 DIAGNOSIS — N95.1 MENOPAUSAL SYNDROME (HOT FLASHES): ICD-10-CM

## 2025-06-02 DIAGNOSIS — M79.89 LEG SWELLING: ICD-10-CM

## 2025-06-02 PROBLEM — J31.0 CHRONIC RHINITIS: Status: RESOLVED | Noted: 2017-04-05 | Resolved: 2025-06-02

## 2025-06-02 LAB
ANION GAP SERPL CALCULATED.3IONS-SCNC: 10 MMOL/L (ref 7–15)
BUN SERPL-MCNC: 11.8 MG/DL (ref 6–20)
CALCIUM SERPL-MCNC: 8.9 MG/DL (ref 8.8–10.4)
CHLORIDE SERPL-SCNC: 106 MMOL/L (ref 98–107)
CHOLEST SERPL-MCNC: 149 MG/DL
CREAT SERPL-MCNC: 0.79 MG/DL (ref 0.51–0.95)
EGFRCR SERPLBLD CKD-EPI 2021: 89 ML/MIN/1.73M2
EST. AVERAGE GLUCOSE BLD GHB EST-MCNC: 103 MG/DL
FASTING STATUS PATIENT QL REPORTED: YES
FASTING STATUS PATIENT QL REPORTED: YES
GLUCOSE SERPL-MCNC: 93 MG/DL (ref 70–99)
HBA1C MFR BLD: 5.2 % (ref 0–5.6)
HCO3 SERPL-SCNC: 24 MMOL/L (ref 22–29)
HDLC SERPL-MCNC: 53 MG/DL
LDLC SERPL CALC-MCNC: 76 MG/DL
NONHDLC SERPL-MCNC: 96 MG/DL
POTASSIUM SERPL-SCNC: 4.4 MMOL/L (ref 3.4–5.3)
SODIUM SERPL-SCNC: 140 MMOL/L (ref 135–145)
TRIGL SERPL-MCNC: 99 MG/DL
TSH SERPL DL<=0.005 MIU/L-ACNC: 3.52 UIU/ML (ref 0.3–4.2)

## 2025-06-02 PROCEDURE — 99396 PREV VISIT EST AGE 40-64: CPT | Performed by: PHYSICIAN ASSISTANT

## 2025-06-02 PROCEDURE — 3074F SYST BP LT 130 MM HG: CPT | Performed by: PHYSICIAN ASSISTANT

## 2025-06-02 PROCEDURE — 83036 HEMOGLOBIN GLYCOSYLATED A1C: CPT | Performed by: PHYSICIAN ASSISTANT

## 2025-06-02 PROCEDURE — 3078F DIAST BP <80 MM HG: CPT | Performed by: PHYSICIAN ASSISTANT

## 2025-06-02 PROCEDURE — 80061 LIPID PANEL: CPT | Performed by: PHYSICIAN ASSISTANT

## 2025-06-02 PROCEDURE — 99213 OFFICE O/P EST LOW 20 MIN: CPT | Mod: 25 | Performed by: PHYSICIAN ASSISTANT

## 2025-06-02 PROCEDURE — 84443 ASSAY THYROID STIM HORMONE: CPT | Performed by: PHYSICIAN ASSISTANT

## 2025-06-02 PROCEDURE — 80048 BASIC METABOLIC PNL TOTAL CA: CPT | Performed by: PHYSICIAN ASSISTANT

## 2025-06-02 PROCEDURE — 3044F HG A1C LEVEL LT 7.0%: CPT | Performed by: PHYSICIAN ASSISTANT

## 2025-06-02 PROCEDURE — 36415 COLL VENOUS BLD VENIPUNCTURE: CPT | Performed by: PHYSICIAN ASSISTANT

## 2025-06-02 RX ORDER — FUROSEMIDE 20 MG/1
20 TABLET ORAL DAILY PRN
Qty: 90 TABLET | Refills: 2 | Status: SHIPPED | OUTPATIENT
Start: 2025-06-02

## 2025-06-02 RX ORDER — NORETHINDRONE ACETATE AND ETHINYL ESTRADIOL .5; 2.5 MG/1; UG/1
1 TABLET ORAL DAILY
Qty: 90 TABLET | Refills: 3 | Status: SHIPPED | OUTPATIENT
Start: 2025-06-02

## 2025-06-02 RX ORDER — LEVOCETIRIZINE DIHYDROCHLORIDE 5 MG/1
5 TABLET, FILM COATED ORAL EVERY EVENING
Qty: 90 TABLET | Refills: 0 | Status: SHIPPED | OUTPATIENT
Start: 2025-06-02

## 2025-06-02 RX ORDER — EPINEPHRINE 0.3 MG/.3ML
0.3 INJECTION SUBCUTANEOUS PRN
Qty: 1 EACH | Refills: 1 | Status: SHIPPED | OUTPATIENT
Start: 2025-06-02

## 2025-06-02 RX ORDER — ALBUTEROL SULFATE 90 UG/1
2 INHALANT RESPIRATORY (INHALATION) EVERY 6 HOURS PRN
Qty: 18 G | Refills: 1 | Status: SHIPPED | OUTPATIENT
Start: 2025-06-02

## 2025-06-02 RX ORDER — FLUTICASONE PROPIONATE 50 MCG
1 SPRAY, SUSPENSION (ML) NASAL DAILY
Qty: 16 G | Refills: 11 | Status: SHIPPED | OUTPATIENT
Start: 2025-06-02

## 2025-06-02 NOTE — PATIENT INSTRUCTIONS
CALCIUM Recommendation for postmenopausal women:  1200 mg/day in divided dose of no more than 500 mg/dose. This includes what is in your food and also what is in any supplements you are taking.       Dietary sources of calcium: These also contain vitamin D  Milk / buttermilk             8 oz              300 mg  Dry milk powder           5 Tbsp           300 mg  Yogurt                          1 cup             400 mg  Ice cream                     1/2 cup          100 mg  Hard cheese                1.5 oz            300 mg  Cottage cheese           1/2 cup          65 mg  Spinach, cooked          1 cup            240 mg  Tofu, soft regular          4 oz             120 to 390 mg  Sardines                       3 oz               370 mg  Mackerel canned          3 oz                250 mg  Canned salmon with bones 3 oz        170-210 mg  Calcium fortified cereals are available  SILK soy and almond milk products are calcium fortified  Orange juice  Fortified with Calcium       8 oz            300 mg        Patient Education   Preventive Care Advice   This is general advice given by our system to help you stay healthy. However, your care team may have specific advice just for you. Please talk to your care team about your preventive care needs.  Nutrition  Eat 5 or more servings of fruits and vegetables each day.  Try wheat bread, brown rice and whole grain pasta (instead of white bread, rice, and pasta).  Get enough calcium and vitamin D. Check the label on foods and aim for 100% of the RDA (recommended daily allowance).  Lifestyle  Exercise at least 150 minutes each week  (30 minutes a day, 5 days a week).  Do muscle strengthening activities 2 days a week. These help control your weight and prevent disease.  No smoking.  Wear sunscreen to prevent skin cancer.  Have a dental exam and cleaning every 6 months.  Yearly exams  See your health care team every year to talk about:  Any changes in your health.  Any medicines  your care team has prescribed.  Preventive care, family planning, and ways to prevent chronic diseases.  Shots (vaccines)   HPV shots (up to age 26), if you've never had them before.  Hepatitis B shots (up to age 59), if you've never had them before.  COVID-19 shot: Get this shot when it's due.  Flu shot: Get a flu shot every year.  Tetanus shot: Get a tetanus shot every 10 years.  Pneumococcal, hepatitis A, and RSV shots: Ask your care team if you need these based on your risk.  Shingles shot (for age 50 and up)  General health tests  Diabetes screening:  Starting at age 35, Get screened for diabetes at least every 3 years.  If you are younger than age 35, ask your care team if you should be screened for diabetes.  Cholesterol test: At age 39, start having a cholesterol test every 5 years, or more often if advised.  Bone density scan (DEXA): At age 50, ask your care team if you should have this scan for osteoporosis (brittle bones).  Hepatitis C: Get tested at least once in your life.  STIs (sexually transmitted infections)  Before age 24: Ask your care team if you should be screened for STIs.  After age 24: Get screened for STIs if you're at risk. You are at risk for STIs (including HIV) if:  You are sexually active with more than one person.  You don't use condoms every time.  You or a partner was diagnosed with a sexually transmitted infection.  If you are at risk for HIV, ask about PrEP medicine to prevent HIV.  Get tested for HIV at least once in your life, whether you are at risk for HIV or not.  Cancer screening tests  Cervical cancer screening: If you have a cervix, begin getting regular cervical cancer screening tests starting at age 21.  Breast cancer scan (mammogram): If you've ever had breasts, begin having regular mammograms starting at age 40. This is a scan to check for breast cancer.  Colon cancer screening: It is important to start screening for colon cancer at age 45.  Have a colonoscopy test  every 10 years (or more often if you're at risk) Or, ask your provider about stool tests like a FIT test every year or Cologuard test every 3 years.  To learn more about your testing options, visit:   .  For help making a decision, visit:   https://bit.ly/wn53990.  Prostate cancer screening test: If you have a prostate, ask your care team if a prostate cancer screening test (PSA) at age 55 is right for you.  Lung cancer screening: If you are a current or former smoker ages 50 to 80, ask your care team if ongoing lung cancer screenings are right for you.  For informational purposes only. Not to replace the advice of your health care provider. Copyright   2023 [facility]. All rights reserved. Clinically reviewed by the [facility] Transitions Program. [employer] [id number] - REV 01/24.

## 2025-06-02 NOTE — PROGRESS NOTES
Preventive Care Visit  [facility] PATRICIA David PA-C, Family Medicine  Jun 2, 2025      Assessment & Plan     Routine general medical examination at a health care facility  Discussed routine health maintenance and lifestyle recommendations including diet and exercise recommendations.  Appropriate preventive services were discussed with this patient, including applicable screening as appropriate for cardiovascular disease, diabetes, osteopenia/osteoporosis, and glaucoma.  As appropriate for age/gender, discussed screening for colorectal cancer, prostate cancer, breast cancer, and cervical cancer. Discussed applicable vaccinations and recommended labwork.  Checklist reviewing preventive services available has been given to the patient in preventive handout.     Obesity (BMI 30-39.9)  Stable, no current medications. Has lost weight with weight watchers and [employer].    Fibromyalgia  Improved with increased activity level  - TSH with free T4 reflex; Future  - TSH with free T4 reflex      Leg swelling  Uses lasix PRN, more frequently it seems this year. Check labs.  - BASIC METABOLIC PANEL; Future  - furosemide (LASIX) 20 MG tablet; Take 1 tablet (20 mg) by mouth daily as needed (edema).  - BASIC METABOLIC PANEL    Menopausal syndrome (hot flashes)  Stable. Improved with low dose HRT. Discussed this, will continue.  - norethindrone-eth estradiol (FEMHRT LOW DOSE) 0.5-2.5 MG-MCG tablet; Take 1 tablet by mouth daily.    Gastroesophageal reflux disease without esophagitis  Improved. Now only taking omeprazole PRN rather than daily.    Multiple environmental allergies  Worse this year than typical. Refilled medications which help.  - albuterol (PROAIR HFA/PROVENTIL HFA/VENTOLIN HFA) 108 (90 Base) MCG/ACT inhaler; Inhale 2 puffs into the lungs every 6 hours as needed for shortness of breath or wheezing.  - EPINEPHrine (ANY BX GENERIC EQUIV) 0.3 MG/0.3ML injection 2-pack; Inject 0.3 mLs (0.3 mg) into the  "muscle as needed for anaphylaxis.  - levocetirizine (XYZAL) 5 MG tablet; Take 1 tablet (5 mg) by mouth every evening.  - fluticasone (FLONASE) 50 MCG/ACT nasal spray; Spray 1 spray into both nostrils daily.    Lipid screening  - Lipid panel reflex to direct LDL Fasting; Future  - Lipid panel reflex to direct LDL Fasting    Screening for diabetes mellitus  - Hemoglobin A1c; Future  - Hemoglobin A1c      BMI  Estimated body mass index is 38.31 kg/m  as calculated from the following:    Height as of this encounter: 1.6 m (5' 2.99\").    Weight as of this encounter: 98.1 kg (216 lb 3.2 oz).   Weight management plan: Discussed healthy diet and exercise guidelines    Counseling  Appropriate preventive services were addressed with this patient via screening, questionnaire, or discussion as appropriate for fall prevention, nutrition, physical activity, Tobacco-use cessation, social engagement, weight loss and cognition.  Checklist reviewing preventive services available has been given to the patient.  Reviewed patient's diet, addressing concerns and/or questions.   She is at risk for lack of exercise and has been provided with information to increase physical activity for the benefit of her well-being.     Nilo Jansen is a 55 year old, presenting for the following:  Physical        6/2/2025     7:19 AM   Additional Questions   Roomed by Rosemary   Accompanied by Self          HPI       Advance Care Planning    Discussed advance care planning with patient; informed AVS has link to [facility] [employer].        6/1/2025   General Health   How would you rate your overall physical health? Good   Feel stress (tense, anxious, or unable to sleep) Not at all         6/1/2025   Nutrition   Three or more servings of calcium each day? Yes   Diet: Regular (no restrictions)   How many servings of fruit and vegetables per day? (!) 0-1   How many sweetened beverages each day? 0-1         6/1/2025   Exercise   Days per week of " moderate/strenous exercise 3 days   Average minutes spent exercising at this level 30 min         6/1/2025   Social Factors   Frequency of gathering with friends or relatives Twice a week   Worry food won't last until get money to buy more No   Food not last or not have enough money for food? No   Do you have housing? (Housing is defined as stable permanent housing and does not include staying outside in a car, in a tent, in an abandoned building, in an overnight shelter, or couch-surfing.) Yes   Are you worried about losing your housing? No   Lack of transportation? No   Unable to get utilities (heat,electricity)? No         6/1/2025   Fall Risk   Fallen 2 or more times in the past year? No   Trouble with walking or balance? No          6/1/2025   Dental   Dentist two times every year? Yes         Today's PHQ-2 Score:       6/1/2025     9:36 PM   PHQ-2 ( 1999 [employer])   Q1: Little interest or pleasure in doing things 0   Q2: Feeling down, depressed or hopeless 0   PHQ-2 Score 0    Q1: Little interest or pleasure in doing things Not at all   Q2: Feeling down, depressed or hopeless Not at all   PHQ-2 Score 0       Patient-reported           6/1/2025   Substance Use   Alcohol more than 3/day or more than 7/wk No   Do you use any other substances recreationally? No     Social History     Tobacco Use    Smoking status: Never    Smokeless tobacco: Never   Vaping Use    Vaping status: Never Used   Substance Use Topics    Alcohol use: Yes     Comment: A couple drinks per week    Drug use: Never           6/5/2024   LAST FHS-7 RESULTS   1st degree relative breast or ovarian cancer No   Any relative bilateral breast cancer Unknown   Any male have breast cancer Unknown   Any ONE woman have BOTH breast AND ovarian cancer Unknown   Any woman with breast cancer before 50yrs Unknown   2 or more relatives with breast AND/OR ovarian cancer Unknown   2 or more relatives with breast AND/OR bowel cancer Unknown        Mammogram  Screening - Mammogram every 1-2 years updated in Health Maintenance based on mutual decision making        6/1/2025   STI Screening   New sexual partner(s) since last STI/HIV test? No     History of abnormal Pap smear: No - age 30- 64 PAP with HPV every 5 years recommended        Latest Ref Rng & Units 12/4/2020    10:01 AM 12/4/2020     9:50 AM 4/5/2017     2:00 PM   PAP / HPV   PAP (Historical)  NIL   NIL    HPV 16 DNA NEG^Negative  Negative  Negative    HPV 18 DNA NEG^Negative  Negative  Negative    Other HR HPV NEG^Negative  Negative  Negative      ASCVD Risk   The 10-year ASCVD risk score (Harini FORRESTER, et al., 2019) is: 1.1%    Values used to calculate the score:      Age: 55 years      Sex: Female      Is Non- : No      Diabetic: No      Tobacco smoker: No      Systolic Blood Pressure: 108 mmHg      Is BP treated: No      HDL Cholesterol: 42 mg/dL      Total Cholesterol: 148 mg/dL       Reviewed and updated as needed this visit by Provider                    Past Medical History:   Diagnosis Date    Fibromyalgia     Uncomplicated asthma 2006    Weather and sports related     Past Surgical History:   Procedure Laterality Date    COLONOSCOPY N/A 09/19/2022    Procedure: COLONOSCOPY;  Surgeon: Angel Peace MD;  Location: [facility]    ENT SURGERY  1976    Several sets of ear tubes as a child    GENITOURINARY SURGERY  2004    Bladder sling put in twice     Lab work is in process  Labs reviewed in EPIC  BP Readings from Last 3 Encounters:   06/02/25 108/73   06/27/24 139/82   05/31/24 106/78    Wt Readings from Last 3 Encounters:   06/02/25 98.1 kg (216 lb 3.2 oz)   06/27/24 105.7 kg (233 lb 1.6 oz)   05/31/24 105 kg (231 lb 8 oz)                  Patient Active Problem List   Diagnosis    Chronic rhinitis    Fibromyalgia    Environmental allergies    Obesity, Class III, BMI 40-49.9 (morbid obesity) (H)     Past Surgical History:   Procedure Laterality Date    COLONOSCOPY N/A 09/19/2022  "   Procedure: COLONOSCOPY;  Surgeon: Angel Peace MD;  Location: [facility]    ENT SURGERY  1976    Several sets of ear tubes as a child    GENITOURINARY SURGERY  2004    Bladder sling put in twice       Social History     Tobacco Use    Smoking status: Never    Smokeless tobacco: Never   Substance Use Topics    Alcohol use: Yes     Comment: A couple drinks per week     Family History   Adopted: Yes   Problem Relation Age of Onset    Diabetes Mother     Hypertension Mother     Hyperlipidemia Mother     Asthma Mother     Obesity Mother     Unknown/Adopted Other              Review of Systems  CONSTITUTIONAL: NEGATIVE for fever, chills, change in weight  INTEGUMENTARY/SKIN: NEGATIVE for worrisome rashes, moles or lesions  EYES: NEGATIVE for vision changes or irritation  ENT/MOUTH: NEGATIVE for ear, mouth and throat problems  RESP: NEGATIVE for significant cough or SOB  BREAST: NEGATIVE for masses, tenderness or discharge  CV: NEGATIVE for chest pain, palpitations or peripheral edema  GI: NEGATIVE for nausea, abdominal pain, heartburn, or change in bowel habits  : NEGATIVE for frequency, dysuria, or hematuria  MUSCULOSKELETAL: NEGATIVE for significant arthralgias or myalgia  NEURO: NEGATIVE for weakness, dizziness or paresthesias  ENDOCRINE: NEGATIVE for temperature intolerance, skin/hair changes  HEME: NEGATIVE for bleeding problems  PSYCHIATRIC: NEGATIVE for changes in mood or affect     Objective    Exam  /73   Pulse 85   Temp 97.6  F (36.4  C) (Tympanic)   Resp 16   Ht 1.6 m (5' 2.99\")   Wt 98.1 kg (216 lb 3.2 oz)   LMP 03/18/2021 (Approximate)   SpO2 100%   BMI 38.31 kg/m     Estimated body mass index is 38.31 kg/m  as calculated from the following:    Height as of this encounter: 1.6 m (5' 2.99\").    Weight as of this encounter: 98.1 kg (216 lb 3.2 oz).    Physical Exam  GENERAL: alert and no distress  EYES: Eyes grossly normal to inspection, PERRL and conjunctivae and sclerae normal  HENT: ear " canals and TM's normal, nose and mouth without ulcers or lesions  NECK: no adenopathy, no asymmetry, masses, or scars  RESP: lungs clear to auscultation - no rales, rhonchi or wheezes  CV: regular rate and rhythm, normal S1 S2, no S3 or S4, no murmur, click or rub, no peripheral edema  ABDOMEN: soft, nontender, no hepatosplenomegaly, no masses and bowel sounds normal  MS: no gross musculoskeletal defects noted, no edema  SKIN: no suspicious lesions or rashes  NEURO: Normal strength and tone, mentation intact and speech normal  PSYCH: mentation appears normal, affect normal/bright        Signed Electronically by: Judy David PA-C

## 2025-06-03 ENCOUNTER — RESULTS FOLLOW-UP (OUTPATIENT)
Dept: FAMILY MEDICINE | Facility: CLINIC | Age: 55
End: 2025-06-03

## 2025-06-13 ENCOUNTER — ANCILLARY PROCEDURE (OUTPATIENT)
Dept: MAMMOGRAPHY | Facility: CLINIC | Age: 55
End: 2025-06-13
Attending: PHYSICIAN ASSISTANT
Payer: COMMERCIAL

## 2025-06-13 DIAGNOSIS — Z12.31 VISIT FOR SCREENING MAMMOGRAM: ICD-10-CM

## 2025-06-13 PROCEDURE — 77067 SCR MAMMO BI INCL CAD: CPT | Mod: GC

## 2025-06-13 PROCEDURE — 77063 BREAST TOMOSYNTHESIS BI: CPT | Mod: GC

## 2025-08-05 ENCOUNTER — MYC REFILL (OUTPATIENT)
Dept: FAMILY MEDICINE | Facility: CLINIC | Age: 55
End: 2025-08-05
Payer: COMMERCIAL

## 2025-08-05 DIAGNOSIS — Z91.09 MULTIPLE ENVIRONMENTAL ALLERGIES: ICD-10-CM

## 2025-08-05 RX ORDER — LEVOCETIRIZINE DIHYDROCHLORIDE 5 MG/1
5 TABLET, FILM COATED ORAL EVERY EVENING
Qty: 90 TABLET | Refills: 3 | Status: SHIPPED | OUTPATIENT
Start: 2025-08-05

## (undated) RX ORDER — PROPOFOL 10 MG/ML
INJECTION, EMULSION INTRAVENOUS
Status: DISPENSED
Start: 2022-09-19